# Patient Record
Sex: MALE | Race: WHITE | NOT HISPANIC OR LATINO | Employment: OTHER | URBAN - METROPOLITAN AREA
[De-identification: names, ages, dates, MRNs, and addresses within clinical notes are randomized per-mention and may not be internally consistent; named-entity substitution may affect disease eponyms.]

---

## 2020-10-22 ENCOUNTER — TELEPHONE (OUTPATIENT)
Dept: UROLOGY | Facility: CLINIC | Age: 85
End: 2020-10-22

## 2020-10-22 NOTE — TELEPHONE ENCOUNTER
Called pt to schedule appt/ referral . Pt stated he was seeing other provider. Does not need to schedule.

## 2021-01-14 ENCOUNTER — OFFICE VISIT (OUTPATIENT)
Dept: FAMILY MEDICINE | Facility: CLINIC | Age: 86
End: 2021-01-14
Payer: MEDICARE

## 2021-01-14 VITALS
SYSTOLIC BLOOD PRESSURE: 129 MMHG | DIASTOLIC BLOOD PRESSURE: 63 MMHG | TEMPERATURE: 97 F | OXYGEN SATURATION: 96 % | BODY MASS INDEX: 31.05 KG/M2 | WEIGHT: 193.19 LBS | HEART RATE: 80 BPM | HEIGHT: 66 IN

## 2021-01-14 DIAGNOSIS — I10 ESSENTIAL HYPERTENSION: Primary | ICD-10-CM

## 2021-01-14 DIAGNOSIS — M10.9 GOUTY ARTHROPATHY: ICD-10-CM

## 2021-01-14 DIAGNOSIS — N40.0 BENIGN PROSTATIC HYPERPLASIA, UNSPECIFIED WHETHER LOWER URINARY TRACT SYMPTOMS PRESENT: ICD-10-CM

## 2021-01-14 PROBLEM — M17.9 OSTEOARTHRITIS OF KNEE: Status: ACTIVE | Noted: 2017-11-08

## 2021-01-14 PROBLEM — E78.2 MIXED HYPERLIPIDEMIA: Status: ACTIVE | Noted: 2021-01-14

## 2021-01-14 PROBLEM — M79.7 FIBROMYOSITIS: Status: ACTIVE | Noted: 2021-01-14

## 2021-01-14 PROBLEM — N18.30 STAGE 3 CHRONIC KIDNEY DISEASE: Status: ACTIVE | Noted: 2018-11-08

## 2021-01-14 PROCEDURE — 99213 PR OFFICE/OUTPT VISIT, EST, LEVL III, 20-29 MIN: ICD-10-PCS | Mod: S$GLB,,, | Performed by: FAMILY MEDICINE

## 2021-01-14 PROCEDURE — 99213 OFFICE O/P EST LOW 20 MIN: CPT | Mod: S$GLB,,, | Performed by: FAMILY MEDICINE

## 2021-01-14 RX ORDER — MECLIZINE HYDROCHLORIDE 25 MG/1
25 TABLET ORAL 3 TIMES DAILY PRN
COMMUNITY
End: 2021-12-09

## 2021-01-14 RX ORDER — TAMSULOSIN HYDROCHLORIDE 0.4 MG/1
0.4 CAPSULE ORAL DAILY
Qty: 90 CAPSULE | Refills: 3 | Status: SHIPPED | OUTPATIENT
Start: 2021-01-14 | End: 2021-12-09 | Stop reason: SDUPTHER

## 2021-01-14 RX ORDER — HYDROCHLOROTHIAZIDE 12.5 MG/1
12.5 CAPSULE ORAL DAILY
Qty: 90 CAPSULE | Refills: 3 | Status: SHIPPED | OUTPATIENT
Start: 2021-01-14 | End: 2021-12-09 | Stop reason: SDUPTHER

## 2021-01-14 RX ORDER — SPIRONOLACTONE 25 MG/1
25 TABLET ORAL DAILY
COMMUNITY
Start: 2021-01-07 | End: 2021-12-09

## 2021-01-14 RX ORDER — LISINOPRIL 40 MG/1
40 TABLET ORAL DAILY
Qty: 90 TABLET | Refills: 30 | Status: SHIPPED | OUTPATIENT
Start: 2021-01-14 | End: 2021-12-09 | Stop reason: DRUGHIGH

## 2021-01-14 RX ORDER — LISINOPRIL 40 MG/1
40 TABLET ORAL DAILY
COMMUNITY
Start: 2020-10-14 | End: 2021-01-14 | Stop reason: SDUPTHER

## 2021-01-14 RX ORDER — ALLOPURINOL 300 MG/1
300 TABLET ORAL DAILY
Qty: 90 TABLET | Refills: 3 | Status: SHIPPED | OUTPATIENT
Start: 2021-01-14 | End: 2021-12-09 | Stop reason: SDUPTHER

## 2021-12-09 ENCOUNTER — OFFICE VISIT (OUTPATIENT)
Dept: FAMILY MEDICINE | Facility: CLINIC | Age: 86
End: 2021-12-09
Payer: MEDICARE

## 2021-12-09 ENCOUNTER — LAB VISIT (OUTPATIENT)
Dept: LAB | Facility: CLINIC | Age: 86
End: 2021-12-09
Payer: MEDICARE

## 2021-12-09 VITALS
BODY MASS INDEX: 30.05 KG/M2 | TEMPERATURE: 98 F | WEIGHT: 187 LBS | OXYGEN SATURATION: 98 % | HEART RATE: 63 BPM | SYSTOLIC BLOOD PRESSURE: 100 MMHG | HEIGHT: 66 IN | DIASTOLIC BLOOD PRESSURE: 60 MMHG

## 2021-12-09 DIAGNOSIS — R53.1 FUNCTIONAL WEAKNESS: ICD-10-CM

## 2021-12-09 DIAGNOSIS — M1A.00X0 CHRONIC GOUTY ARTHROPATHY: ICD-10-CM

## 2021-12-09 DIAGNOSIS — D63.8 ANEMIA OF CHRONIC DISEASE: Primary | ICD-10-CM

## 2021-12-09 DIAGNOSIS — M10.9 GOUTY ARTHROPATHY: ICD-10-CM

## 2021-12-09 DIAGNOSIS — E78.2 MIXED HYPERLIPIDEMIA: ICD-10-CM

## 2021-12-09 DIAGNOSIS — N40.0 BENIGN PROSTATIC HYPERPLASIA, UNSPECIFIED WHETHER LOWER URINARY TRACT SYMPTOMS PRESENT: ICD-10-CM

## 2021-12-09 DIAGNOSIS — R53.83 FATIGUE, UNSPECIFIED TYPE: ICD-10-CM

## 2021-12-09 DIAGNOSIS — I10 ESSENTIAL HYPERTENSION: ICD-10-CM

## 2021-12-09 DIAGNOSIS — I10 ESSENTIAL HYPERTENSION: Primary | ICD-10-CM

## 2021-12-09 PROBLEM — M1A.9XX0 CHRONIC GOUTY ARTHROPATHY: Status: ACTIVE | Noted: 2021-01-14

## 2021-12-09 LAB
ALBUMIN SERPL BCP-MCNC: 3.7 G/DL (ref 3.5–5.2)
ALP SERPL-CCNC: 88 U/L (ref 55–135)
ALT SERPL W/O P-5'-P-CCNC: 7 U/L (ref 10–44)
ANION GAP SERPL CALC-SCNC: 11 MMOL/L (ref 8–16)
AST SERPL-CCNC: 16 U/L (ref 10–40)
BASOPHILS # BLD AUTO: 0.05 K/UL (ref 0–0.2)
BASOPHILS NFR BLD: 0.7 % (ref 0–1.9)
BILIRUB SERPL-MCNC: 0.3 MG/DL (ref 0.1–1)
BUN SERPL-MCNC: 31 MG/DL (ref 10–30)
CALCIUM SERPL-MCNC: 9.1 MG/DL (ref 8.7–10.5)
CHLORIDE SERPL-SCNC: 109 MMOL/L (ref 95–110)
CHOLEST SERPL-MCNC: 142 MG/DL (ref 120–199)
CHOLEST/HDLC SERPL: 3.7 {RATIO} (ref 2–5)
CO2 SERPL-SCNC: 22 MMOL/L (ref 23–29)
CREAT SERPL-MCNC: 1.6 MG/DL (ref 0.5–1.4)
DIFFERENTIAL METHOD: ABNORMAL
EOSINOPHIL # BLD AUTO: 0.2 K/UL (ref 0–0.5)
EOSINOPHIL NFR BLD: 2.2 % (ref 0–8)
ERYTHROCYTE [DISTWIDTH] IN BLOOD BY AUTOMATED COUNT: 18.2 % (ref 11.5–14.5)
EST. GFR  (AFRICAN AMERICAN): 42 ML/MIN/1.73 M^2
EST. GFR  (NON AFRICAN AMERICAN): 36 ML/MIN/1.73 M^2
FERRITIN SERPL-MCNC: 8 NG/ML (ref 20–300)
GLUCOSE SERPL-MCNC: 99 MG/DL (ref 70–110)
HCT VFR BLD AUTO: 27.4 % (ref 40–54)
HDLC SERPL-MCNC: 38 MG/DL (ref 40–75)
HDLC SERPL: 26.8 % (ref 20–50)
HGB BLD-MCNC: 8 G/DL (ref 14–18)
IMM GRANULOCYTES # BLD AUTO: 0.01 K/UL (ref 0–0.04)
IMM GRANULOCYTES NFR BLD AUTO: 0.1 % (ref 0–0.5)
IRON SERPL-MCNC: 14 UG/DL (ref 45–160)
LDLC SERPL CALC-MCNC: 89.6 MG/DL (ref 63–159)
LYMPHOCYTES # BLD AUTO: 1.3 K/UL (ref 1–4.8)
LYMPHOCYTES NFR BLD: 18.3 % (ref 18–48)
MCH RBC QN AUTO: 22.2 PG (ref 27–31)
MCHC RBC AUTO-ENTMCNC: 29.2 G/DL (ref 32–36)
MCV RBC AUTO: 76 FL (ref 82–98)
MONOCYTES # BLD AUTO: 0.7 K/UL (ref 0.3–1)
MONOCYTES NFR BLD: 10.8 % (ref 4–15)
NEUTROPHILS # BLD AUTO: 4.7 K/UL (ref 1.8–7.7)
NEUTROPHILS NFR BLD: 67.9 % (ref 38–73)
NONHDLC SERPL-MCNC: 104 MG/DL
NRBC BLD-RTO: 0 /100 WBC
PLATELET # BLD AUTO: 252 K/UL (ref 150–450)
PMV BLD AUTO: 10.6 FL (ref 9.2–12.9)
POTASSIUM SERPL-SCNC: 4.3 MMOL/L (ref 3.5–5.1)
PROT SERPL-MCNC: 6.8 G/DL (ref 6–8.4)
RBC # BLD AUTO: 3.61 M/UL (ref 4.6–6.2)
SATURATED IRON: 3 % (ref 20–50)
SODIUM SERPL-SCNC: 142 MMOL/L (ref 136–145)
TOTAL IRON BINDING CAPACITY: 423 UG/DL (ref 250–450)
TRANSFERRIN SERPL-MCNC: 286 MG/DL (ref 200–375)
TRIGL SERPL-MCNC: 72 MG/DL (ref 30–150)
URATE SERPL-MCNC: 4.7 MG/DL (ref 3.4–7)
WBC # BLD AUTO: 6.88 K/UL (ref 3.9–12.7)

## 2021-12-09 PROCEDURE — 84466 ASSAY OF TRANSFERRIN: CPT | Performed by: NURSE PRACTITIONER

## 2021-12-09 PROCEDURE — 99214 PR OFFICE/OUTPT VISIT, EST, LEVL IV, 30-39 MIN: ICD-10-PCS | Mod: S$GLB,,, | Performed by: NURSE PRACTITIONER

## 2021-12-09 PROCEDURE — 84550 ASSAY OF BLOOD/URIC ACID: CPT | Performed by: NURSE PRACTITIONER

## 2021-12-09 PROCEDURE — 80053 COMPREHEN METABOLIC PANEL: CPT | Performed by: NURSE PRACTITIONER

## 2021-12-09 PROCEDURE — 82728 ASSAY OF FERRITIN: CPT | Performed by: NURSE PRACTITIONER

## 2021-12-09 PROCEDURE — 80061 LIPID PANEL: CPT | Performed by: NURSE PRACTITIONER

## 2021-12-09 PROCEDURE — 85025 COMPLETE CBC W/AUTO DIFF WBC: CPT | Performed by: NURSE PRACTITIONER

## 2021-12-09 PROCEDURE — 99214 OFFICE O/P EST MOD 30 MIN: CPT | Mod: S$GLB,,, | Performed by: NURSE PRACTITIONER

## 2021-12-09 RX ORDER — CHOLESTYRAMINE 4 G/9G
1 POWDER, FOR SUSPENSION ORAL DAILY
Qty: 378 G | Refills: 11 | Status: SHIPPED | OUTPATIENT
Start: 2021-12-09 | End: 2022-09-29 | Stop reason: SDUPTHER

## 2021-12-09 RX ORDER — LISINOPRIL 20 MG/1
20 TABLET ORAL DAILY
Qty: 90 TABLET | Refills: 3 | Status: SHIPPED | OUTPATIENT
Start: 2021-12-09 | End: 2022-08-16 | Stop reason: SDUPTHER

## 2021-12-09 RX ORDER — TAMSULOSIN HYDROCHLORIDE 0.4 MG/1
0.4 CAPSULE ORAL DAILY
Qty: 90 CAPSULE | Refills: 3 | Status: SHIPPED | OUTPATIENT
Start: 2021-12-09 | End: 2022-09-29 | Stop reason: SDUPTHER

## 2021-12-09 RX ORDER — TRAVOPROST OPHTHALMIC SOLUTION 0.04 MG/ML
1 SOLUTION OPHTHALMIC NIGHTLY
COMMUNITY
Start: 2021-11-03 | End: 2021-12-09 | Stop reason: SDUPTHER

## 2021-12-09 RX ORDER — CHOLESTYRAMINE 4 G/9G
1 POWDER, FOR SUSPENSION ORAL DAILY
COMMUNITY
End: 2021-12-09 | Stop reason: SDUPTHER

## 2021-12-09 RX ORDER — ALLOPURINOL 300 MG/1
300 TABLET ORAL DAILY
Qty: 90 TABLET | Refills: 3 | Status: SHIPPED | OUTPATIENT
Start: 2021-12-09 | End: 2022-09-29 | Stop reason: SDUPTHER

## 2021-12-09 RX ORDER — HYDROCHLOROTHIAZIDE 12.5 MG/1
12.5 CAPSULE ORAL DAILY
Qty: 90 CAPSULE | Refills: 3 | Status: SHIPPED | OUTPATIENT
Start: 2021-12-09 | End: 2022-09-29 | Stop reason: SDUPTHER

## 2021-12-10 DIAGNOSIS — N18.30 STAGE 3 CHRONIC KIDNEY DISEASE, UNSPECIFIED WHETHER STAGE 3A OR 3B CKD: ICD-10-CM

## 2021-12-10 DIAGNOSIS — D50.9 IRON DEFICIENCY ANEMIA, UNSPECIFIED IRON DEFICIENCY ANEMIA TYPE: Primary | ICD-10-CM

## 2021-12-10 RX ORDER — LANOLIN ALCOHOL/MO/W.PET/CERES
1 CREAM (GRAM) TOPICAL 2 TIMES DAILY
Qty: 60 TABLET | Refills: 5 | Status: SHIPPED | OUTPATIENT
Start: 2021-12-10 | End: 2022-05-17 | Stop reason: SDUPTHER

## 2022-05-17 ENCOUNTER — PATIENT MESSAGE (OUTPATIENT)
Dept: FAMILY MEDICINE | Facility: CLINIC | Age: 87
End: 2022-05-17
Payer: MEDICARE

## 2022-05-17 ENCOUNTER — OFFICE VISIT (OUTPATIENT)
Dept: FAMILY MEDICINE | Facility: CLINIC | Age: 87
End: 2022-05-17
Payer: MEDICARE

## 2022-05-17 ENCOUNTER — LAB VISIT (OUTPATIENT)
Dept: LAB | Facility: CLINIC | Age: 87
End: 2022-05-17
Payer: MEDICARE

## 2022-05-17 VITALS
SYSTOLIC BLOOD PRESSURE: 152 MMHG | BODY MASS INDEX: 29.98 KG/M2 | HEIGHT: 67 IN | WEIGHT: 191 LBS | HEART RATE: 66 BPM | OXYGEN SATURATION: 99 % | TEMPERATURE: 98 F | DIASTOLIC BLOOD PRESSURE: 64 MMHG

## 2022-05-17 DIAGNOSIS — N18.32 STAGE 3B CHRONIC KIDNEY DISEASE: ICD-10-CM

## 2022-05-17 DIAGNOSIS — D50.0 IRON DEFICIENCY ANEMIA DUE TO CHRONIC BLOOD LOSS: Primary | ICD-10-CM

## 2022-05-17 DIAGNOSIS — D50.0 IRON DEFICIENCY ANEMIA DUE TO CHRONIC BLOOD LOSS: ICD-10-CM

## 2022-05-17 DIAGNOSIS — D50.9 IRON DEFICIENCY ANEMIA, UNSPECIFIED IRON DEFICIENCY ANEMIA TYPE: ICD-10-CM

## 2022-05-17 DIAGNOSIS — I10 ESSENTIAL HYPERTENSION: ICD-10-CM

## 2022-05-17 LAB
ALBUMIN SERPL BCP-MCNC: 3.7 G/DL (ref 3.5–5.2)
ALP SERPL-CCNC: 80 U/L (ref 55–135)
ALT SERPL W/O P-5'-P-CCNC: 14 U/L (ref 10–44)
ANION GAP SERPL CALC-SCNC: 9 MMOL/L (ref 8–16)
ANISOCYTOSIS BLD QL SMEAR: SLIGHT
AST SERPL-CCNC: 16 U/L (ref 10–40)
BASOPHILS # BLD AUTO: 0.05 K/UL (ref 0–0.2)
BASOPHILS NFR BLD: 0.9 % (ref 0–1.9)
BILIRUB SERPL-MCNC: 0.3 MG/DL (ref 0.1–1)
BUN SERPL-MCNC: 31 MG/DL (ref 10–30)
CALCIUM SERPL-MCNC: 8.7 MG/DL (ref 8.7–10.5)
CHLORIDE SERPL-SCNC: 109 MMOL/L (ref 95–110)
CO2 SERPL-SCNC: 22 MMOL/L (ref 23–29)
CREAT SERPL-MCNC: 1.6 MG/DL (ref 0.5–1.4)
DACRYOCYTES BLD QL SMEAR: ABNORMAL
DIFFERENTIAL METHOD: ABNORMAL
EOSINOPHIL # BLD AUTO: 0.2 K/UL (ref 0–0.5)
EOSINOPHIL NFR BLD: 3.7 % (ref 0–8)
ERYTHROCYTE [DISTWIDTH] IN BLOOD BY AUTOMATED COUNT: 19.7 % (ref 11.5–14.5)
EST. GFR  (AFRICAN AMERICAN): 42 ML/MIN/1.73 M^2
EST. GFR  (NON AFRICAN AMERICAN): 36 ML/MIN/1.73 M^2
GLUCOSE SERPL-MCNC: 101 MG/DL (ref 70–110)
HCT VFR BLD AUTO: 23.3 % (ref 40–54)
HGB BLD-MCNC: 6.4 G/DL (ref 14–18)
HYPOCHROMIA BLD QL SMEAR: ABNORMAL
IMM GRANULOCYTES # BLD AUTO: 0.01 K/UL (ref 0–0.04)
IMM GRANULOCYTES NFR BLD AUTO: 0.2 % (ref 0–0.5)
LYMPHOCYTES # BLD AUTO: 1.5 K/UL (ref 1–4.8)
LYMPHOCYTES NFR BLD: 26.1 % (ref 18–48)
MCH RBC QN AUTO: 19.1 PG (ref 27–31)
MCHC RBC AUTO-ENTMCNC: 27.5 G/DL (ref 32–36)
MCV RBC AUTO: 70 FL (ref 82–98)
MONOCYTES # BLD AUTO: 0.6 K/UL (ref 0.3–1)
MONOCYTES NFR BLD: 10.9 % (ref 4–15)
NEUTROPHILS # BLD AUTO: 3.3 K/UL (ref 1.8–7.7)
NEUTROPHILS NFR BLD: 58.2 % (ref 38–73)
NRBC BLD-RTO: 0 /100 WBC
OVALOCYTES BLD QL SMEAR: ABNORMAL
PLATELET # BLD AUTO: 262 K/UL (ref 150–450)
PLATELET BLD QL SMEAR: ABNORMAL
PMV BLD AUTO: 10.3 FL (ref 9.2–12.9)
POIKILOCYTOSIS BLD QL SMEAR: SLIGHT
POLYCHROMASIA BLD QL SMEAR: ABNORMAL
POTASSIUM SERPL-SCNC: 4.5 MMOL/L (ref 3.5–5.1)
PROT SERPL-MCNC: 6.7 G/DL (ref 6–8.4)
RBC # BLD AUTO: 3.35 M/UL (ref 4.6–6.2)
RETICS/RBC NFR AUTO: 2.1 % (ref 0.4–2)
SODIUM SERPL-SCNC: 140 MMOL/L (ref 136–145)
WBC # BLD AUTO: 5.7 K/UL (ref 3.9–12.7)

## 2022-05-17 PROCEDURE — 99214 OFFICE O/P EST MOD 30 MIN: CPT | Mod: S$PBB,,, | Performed by: FAMILY MEDICINE

## 2022-05-17 PROCEDURE — 99999 PR PBB SHADOW E&M-EST. PATIENT-LVL III: CPT | Mod: PBBFAC,,, | Performed by: FAMILY MEDICINE

## 2022-05-17 PROCEDURE — 99213 OFFICE O/P EST LOW 20 MIN: CPT | Mod: PBBFAC,PN | Performed by: FAMILY MEDICINE

## 2022-05-17 PROCEDURE — 99214 PR OFFICE/OUTPT VISIT, EST, LEVL IV, 30-39 MIN: ICD-10-PCS | Mod: S$PBB,,, | Performed by: FAMILY MEDICINE

## 2022-05-17 PROCEDURE — 85025 COMPLETE CBC W/AUTO DIFF WBC: CPT | Performed by: FAMILY MEDICINE

## 2022-05-17 PROCEDURE — 99999 PR PBB SHADOW E&M-EST. PATIENT-LVL III: ICD-10-PCS | Mod: PBBFAC,,, | Performed by: FAMILY MEDICINE

## 2022-05-17 PROCEDURE — 85045 AUTOMATED RETICULOCYTE COUNT: CPT | Performed by: FAMILY MEDICINE

## 2022-05-17 PROCEDURE — 80053 COMPREHEN METABOLIC PANEL: CPT | Performed by: FAMILY MEDICINE

## 2022-05-17 RX ORDER — FERROUS SULFATE 325(65) MG
325 TABLET, DELAYED RELEASE (ENTERIC COATED) ORAL 2 TIMES DAILY
Qty: 60 TABLET | Refills: 5 | Status: SHIPPED | OUTPATIENT
Start: 2022-05-17 | End: 2023-01-03

## 2022-05-17 NOTE — PROGRESS NOTES
SUBJECTIVE:    Patient ID: Vel Banda is a 89 y.o. male.    Chief Complaint: Hearing Problem (Right ear- denies any pain or drainage. Denies any uri symptoms.)    Follow-up on this very active alert 89-year-old male.  He comes in complaining of his right ear being stopped up.  He additionally complains of some fatigue however he is still very active and [employer] a [place] acre field regularly,   etc.  Review of his lab last December shows patient with the new onset of anemia with a hemoglobin of 8 and a hematocrit of 27.  This is a significant decrease from October of 2020 when his H&H was 15 in 45.  Iron levels are markedly decreased in December.  A prescription for ferrous sulfate was called in for him to take however apparently he did not receive his prescription.    He has had a colonoscopy in the distant past.  He did not do a fecal occult blood  He has noticed no blood in his stool.  He denies any upper GI are gastric upset.              Lab Visit on 12/09/2021   Component Date Value Ref Range Status    Sodium 12/09/2021 142  136 - 145 mmol/L Final    Potassium 12/09/2021 4.3  3.5 - 5.1 mmol/L Final    Chloride 12/09/2021 109  95 - 110 mmol/L Final    CO2 12/09/2021 22 (A) 23 - 29 mmol/L Final    Glucose 12/09/2021 99  70 - 110 mg/dL Final    BUN 12/09/2021 31 (A) 10 - 30 mg/dL Final    Creatinine 12/09/2021 1.6 (A) 0.5 - 1.4 mg/dL Final    Calcium 12/09/2021 9.1  8.7 - 10.5 mg/dL Final    Total Protein 12/09/2021 6.8  6.0 - 8.4 g/dL Final    Albumin 12/09/2021 3.7  3.5 - 5.2 g/dL Final    Total Bilirubin 12/09/2021 0.3  0.1 - 1.0 mg/dL Final    Alkaline Phosphatase 12/09/2021 88  55 - 135 U/L Final    AST 12/09/2021 16  10 - 40 U/L Final    ALT 12/09/2021 7 (A) 10 - 44 U/L Final    Anion Gap 12/09/2021 11  8 - 16 mmol/L Final    eGFR if  12/09/2021 42 (A) >60 mL/min/1.73 m^2 Final    eGFR if non  12/09/2021 36 (A) >60 mL/min/1.73 m^2 Final    Cholesterol  12/09/2021 142  120 - 199 mg/dL Final    Triglycerides 12/09/2021 72  30 - 150 mg/dL Final    HDL 12/09/2021 38 (A) 40 - 75 mg/dL Final    LDL Cholesterol 12/09/2021 89.6  63.0 - 159.0 mg/dL Final    HDL/Cholesterol Ratio 12/09/2021 26.8  20.0 - 50.0 % Final    Total Cholesterol/HDL Ratio 12/09/2021 3.7  2.0 - 5.0 Final    Non-HDL Cholesterol 12/09/2021 104  mg/dL Final    WBC 12/09/2021 6.88  3.90 - 12.70 K/uL Final    RBC 12/09/2021 3.61 (A) 4.60 - 6.20 M/uL Final    Hemoglobin 12/09/2021 8.0 (A) 14.0 - 18.0 g/dL Final    Hematocrit 12/09/2021 27.4 (A) 40.0 - 54.0 % Final    MCV 12/09/2021 76 (A) 82 - 98 fL Final    MCH 12/09/2021 22.2 (A) 27.0 - 31.0 pg Final    MCHC 12/09/2021 29.2 (A) 32.0 - 36.0 g/dL Final    RDW 12/09/2021 18.2 (A) 11.5 - 14.5 % Final    Platelets 12/09/2021 252  150 - 450 K/uL Final    MPV 12/09/2021 10.6  9.2 - 12.9 fL Final    Immature Granulocytes 12/09/2021 0.1  0.0 - 0.5 % Final    Gran # (ANC) 12/09/2021 4.7  1.8 - 7.7 K/uL Final    Immature Grans (Abs) 12/09/2021 0.01  0.00 - 0.04 K/uL Final    Lymph # 12/09/2021 1.3  1.0 - 4.8 K/uL Final    Mono # 12/09/2021 0.7  0.3 - 1.0 K/uL Final    Eos # 12/09/2021 0.2  0.0 - 0.5 K/uL Final    Baso # 12/09/2021 0.05  0.00 - 0.20 K/uL Final    nRBC 12/09/2021 0  0 /100 WBC Final    Gran % 12/09/2021 67.9  38.0 - 73.0 % Final    Lymph % 12/09/2021 18.3  18.0 - 48.0 % Final    Mono % 12/09/2021 10.8  4.0 - 15.0 % Final    Eosinophil % 12/09/2021 2.2  0.0 - 8.0 % Final    Basophil % 12/09/2021 0.7  0.0 - 1.9 % Final    Differential Method 12/09/2021 Automated   Final    Uric Acid 12/09/2021 4.7  3.4 - 7.0 mg/dL Final    Iron 12/09/2021 14 (A) 45 - 160 ug/dL Final    Transferrin 12/09/2021 286  200 - 375 mg/dL Final    TIBC 12/09/2021 423  250 - 450 ug/dL Final    Saturated Iron 12/09/2021 3 (A) 20 - 50 % Final    Ferritin 12/09/2021 8 (A) 20.0 - 300.0 ng/mL Final       Past Medical History:   Diagnosis Date     Actinic keratosis     Benign prostatic hyperplasia     Chronic kidney disease     Stage 3     Fibromyositis     Gouty arthropathy     Hyperlipidemia     Hypertension     Osteoarthritis of knee      Social History     Socioeconomic History    Marital status:    Tobacco Use    Smoking status: Former Smoker     Types: Cigarettes     Quit date:      Years since quittin.4    Smokeless tobacco: Never Used   Substance and Sexual Activity    Alcohol use: Yes    Drug use: Never     Past Surgical History:   Procedure Laterality Date    APPENDECTOMY      cataractsx2  2011    COLONOSCOPY       Family History   Problem Relation Age of Onset    Hypertension Mother     Coronary artery disease Mother        Review of patient's allergies indicates:   Allergen Reactions    Pcn [penicillins]        Current Outpatient Medications:     allopurinoL (ZYLOPRIM) 300 MG tablet, Take 1 tablet (300 mg total) by mouth once daily., Disp: 90 tablet, Rfl: 3    aspirin 81 MG Chew, Take 81 mg by mouth once daily., Disp: , Rfl:     cholestyramine, with sugar, 4 gram Powd, Take 1 Scoop by mouth once daily., Disp: 378 g, Rfl: 11    ferrous sulfate 325 (65 FE) MG EC tablet, Take 1 tablet (325 mg total) by mouth 2 (two) times daily., Disp: 60 tablet, Rfl: 5    hydroCHLOROthiazide (MICROZIDE) 12.5 mg capsule, Take 1 capsule (12.5 mg total) by mouth once daily., Disp: 90 capsule, Rfl: 3    lisinopriL (PRINIVIL,ZESTRIL) 20 MG tablet, Take 1 tablet (20 mg total) by mouth once daily., Disp: 90 tablet, Rfl: 3    tamsulosin (FLOMAX) 0.4 mg Cap, Take 1 capsule (0.4 mg total) by mouth once daily., Disp: 90 capsule, Rfl: 3    travoprost, benzalkonium, (TRAVATAN) 0.004 % ophthalmic solution, 1 drop every evening., Disp: , Rfl:     Review of Systems   Constitutional:            Obsipation  Lower leg claudication at 50 steps           Objective:      Vitals:    22 1522 22 1525   BP: (!) 156/64 (!) 152/64  "  Pulse: 66    Temp: 98.3 °F (36.8 °C)    TempSrc: Temporal    SpO2: 99%    Weight: 86.6 kg (191 lb)    Height: 5' 7" (1.702 m)      Physical Exam  Eyes:      Comments: Pale conjunctiva   Neck:      Vascular: No carotid bruit.   Cardiovascular:      Rate and Rhythm: Regular rhythm.      Comments:       Decreased  dorsalis pedis and posterior tibial peripheral pulses  Pulmonary:      Effort: Pulmonary effort is normal.      Breath sounds: Normal breath sounds.   Musculoskeletal:      Right lower leg: No edema.      Left lower leg: Edema present.   Neurological:      General: No focal deficit present.      Mental Status: He is alert and oriented to person, place, and time.   Psychiatric:         Mood and Affect: Mood normal.         Behavior: Behavior normal.         Thought Content: Thought content normal.           Assessment:       1. Iron deficiency anemia due to chronic blood loss    2. Essential hypertension    3. Stage 3b chronic kidney disease    4. Iron deficiency anemia, unspecified iron deficiency anemia type         Plan:       Iron deficiency anemia due to chronic blood loss  -     CBC Auto Differential; Future; Expected date: 05/17/2022  -     Reticulocytes; Future; Expected date: 05/17/2022  -     Fecal Immunochemical Test (iFOBT); Future; Expected date: 05/17/2022  -     Comprehensive metabolic panel; Future; Expected date: 05/17/2022  -     CBC Auto Differential; Future; Expected date: 06/17/2022  -     Reticulocytes; Future; Expected date: 06/17/2022    Essential hypertension  -     Comprehensive metabolic panel; Future; Expected date: 05/17/2022    Stage 3b chronic kidney disease    Iron deficiency anemia, unspecified iron deficiency anemia type  -     ferrous sulfate 325 (65 FE) MG EC tablet; Take 1 tablet (325 mg total) by mouth 2 (two) times daily.  Dispense: 60 tablet; Refill: 5      Follow up in about 4 weeks (around 6/14/2022).      Obtain fecal occult blood test.    Recheck CBC  Check retic " count  Start iron twice a day  May use OTC Miralax if needed   5/17/2022 Devon Medrano M.D.

## 2022-05-17 NOTE — PATIENT INSTRUCTIONS
Obtain fecal occult blood test.    Recheck CBC  Check retic count  Start iron twice a day  May use OTC Miralax if needed

## 2022-06-09 ENCOUNTER — LAB VISIT (OUTPATIENT)
Dept: LAB | Facility: HOSPITAL | Age: 87
End: 2022-06-09
Payer: MEDICARE

## 2022-06-09 DIAGNOSIS — D50.0 IRON DEFICIENCY ANEMIA DUE TO CHRONIC BLOOD LOSS: ICD-10-CM

## 2022-06-09 PROCEDURE — 82274 ASSAY TEST FOR BLOOD FECAL: CPT | Performed by: FAMILY MEDICINE

## 2022-06-14 LAB — HEMOCCULT STL QL IA: POSITIVE

## 2022-06-16 ENCOUNTER — LAB VISIT (OUTPATIENT)
Dept: LAB | Facility: CLINIC | Age: 87
End: 2022-06-16
Payer: MEDICARE

## 2022-06-16 ENCOUNTER — OFFICE VISIT (OUTPATIENT)
Dept: FAMILY MEDICINE | Facility: CLINIC | Age: 87
End: 2022-06-16
Payer: MEDICARE

## 2022-06-16 VITALS
TEMPERATURE: 98 F | OXYGEN SATURATION: 98 % | SYSTOLIC BLOOD PRESSURE: 118 MMHG | WEIGHT: 190 LBS | DIASTOLIC BLOOD PRESSURE: 80 MMHG | BODY MASS INDEX: 29.82 KG/M2 | HEIGHT: 67 IN | HEART RATE: 74 BPM

## 2022-06-16 DIAGNOSIS — D50.0 IRON DEFICIENCY ANEMIA DUE TO CHRONIC BLOOD LOSS: Primary | ICD-10-CM

## 2022-06-16 DIAGNOSIS — D50.0 IRON DEFICIENCY ANEMIA DUE TO CHRONIC BLOOD LOSS: ICD-10-CM

## 2022-06-16 LAB
ANISOCYTOSIS BLD QL SMEAR: ABNORMAL
BASOPHILS # BLD AUTO: 0.1 K/UL (ref 0–0.2)
BASOPHILS NFR BLD: 1.5 % (ref 0–1.9)
DACRYOCYTES BLD QL SMEAR: ABNORMAL
DIFFERENTIAL METHOD: ABNORMAL
EOSINOPHIL # BLD AUTO: 0.2 K/UL (ref 0–0.5)
EOSINOPHIL NFR BLD: 3.3 % (ref 0–8)
ERYTHROCYTE [DISTWIDTH] IN BLOOD BY AUTOMATED COUNT: ABNORMAL % (ref 11.5–14.5)
HCT VFR BLD AUTO: 35.4 % (ref 40–54)
HGB BLD-MCNC: 11 G/DL (ref 14–18)
HYPOCHROMIA BLD QL SMEAR: ABNORMAL
IMM GRANULOCYTES # BLD AUTO: 0.03 K/UL (ref 0–0.04)
IMM GRANULOCYTES NFR BLD AUTO: 0.5 % (ref 0–0.5)
IRON SERPL-MCNC: 218 UG/DL (ref 45–160)
LYMPHOCYTES # BLD AUTO: 1.4 K/UL (ref 1–4.8)
LYMPHOCYTES NFR BLD: 21.9 % (ref 18–48)
MCH RBC QN AUTO: 25.5 PG (ref 27–31)
MCHC RBC AUTO-ENTMCNC: 31.1 G/DL (ref 32–36)
MCV RBC AUTO: 82 FL (ref 82–98)
MONOCYTES # BLD AUTO: 0.6 K/UL (ref 0.3–1)
MONOCYTES NFR BLD: 9 % (ref 4–15)
NEUTROPHILS # BLD AUTO: 4.2 K/UL (ref 1.8–7.7)
NEUTROPHILS NFR BLD: 63.8 % (ref 38–73)
NRBC BLD-RTO: 0 /100 WBC
OVALOCYTES BLD QL SMEAR: ABNORMAL
PLATELET # BLD AUTO: 225 K/UL (ref 150–450)
PLATELET BLD QL SMEAR: ABNORMAL
PMV BLD AUTO: 10.5 FL (ref 9.2–12.9)
POIKILOCYTOSIS BLD QL SMEAR: SLIGHT
POLYCHROMASIA BLD QL SMEAR: ABNORMAL
RBC # BLD AUTO: 4.32 M/UL (ref 4.6–6.2)
RETICS/RBC NFR AUTO: 2.7 % (ref 0.4–2)
SATURATED IRON: 58 % (ref 20–50)
SCHISTOCYTES BLD QL SMEAR: ABNORMAL
SCHISTOCYTES BLD QL SMEAR: PRESENT
TOTAL IRON BINDING CAPACITY: 379 UG/DL (ref 250–450)
TRANSFERRIN SERPL-MCNC: 256 MG/DL (ref 200–375)
WBC # BLD AUTO: 6.58 K/UL (ref 3.9–12.7)

## 2022-06-16 PROCEDURE — 85025 COMPLETE CBC W/AUTO DIFF WBC: CPT | Performed by: FAMILY MEDICINE

## 2022-06-16 PROCEDURE — 99999 PR PBB SHADOW E&M-EST. PATIENT-LVL III: ICD-10-PCS | Mod: PBBFAC,,, | Performed by: FAMILY MEDICINE

## 2022-06-16 PROCEDURE — 84466 ASSAY OF TRANSFERRIN: CPT | Performed by: FAMILY MEDICINE

## 2022-06-16 PROCEDURE — 99213 OFFICE O/P EST LOW 20 MIN: CPT | Mod: S$PBB,,, | Performed by: FAMILY MEDICINE

## 2022-06-16 PROCEDURE — 99213 OFFICE O/P EST LOW 20 MIN: CPT | Mod: PBBFAC,PN | Performed by: FAMILY MEDICINE

## 2022-06-16 PROCEDURE — 85045 AUTOMATED RETICULOCYTE COUNT: CPT | Performed by: FAMILY MEDICINE

## 2022-06-16 PROCEDURE — 99999 PR PBB SHADOW E&M-EST. PATIENT-LVL III: CPT | Mod: PBBFAC,,, | Performed by: FAMILY MEDICINE

## 2022-06-16 PROCEDURE — 99213 PR OFFICE/OUTPT VISIT, EST, LEVL III, 20-29 MIN: ICD-10-PCS | Mod: S$PBB,,, | Performed by: FAMILY MEDICINE

## 2022-06-16 NOTE — PATIENT INSTRUCTIONS
Continue iron p.o. 1-2 a day.  Obtain repeat lab work today.  We will set up appointment with Gastroenterology to try to find source of blood loss.

## 2022-06-16 NOTE — PROGRESS NOTES
SUBJECTIVE:    Patient ID: Vel Banda is a 89 y.o. male.    Chief Complaint: Follow-up (Pt here for f/u labs/See reports/No new concerns)    Follow-up on this 89-year-old male who was noted to have a profound anemia last visit.  It was iron deficiency.  Hemoccult is positive    He was placed on iron supplementation 325 mg twice a day.  He is only able to take it once a day.  He does report feeling better.  Family member reports less sleeping and napping during the day.      Obtain repeat CBC today and a Retic count     Needs GI workup to find source of occult in blood.      Lab Visit on 06/09/2022   Component Date Value Ref Range Status    Fecal Immunochemical Test (iFOBT) 06/14/2022 Positive (A) Negative Final   Lab Visit on 05/17/2022   Component Date Value Ref Range Status    WBC 05/17/2022 5.70  3.90 - 12.70 K/uL Final    RBC 05/17/2022 3.35 (A) 4.60 - 6.20 M/uL Final    Hemoglobin 05/17/2022 6.4 (A) 14.0 - 18.0 g/dL Final    Hematocrit 05/17/2022 23.3 (A) 40.0 - 54.0 % Final    MCV 05/17/2022 70 (A) 82 - 98 fL Final    MCH 05/17/2022 19.1 (A) 27.0 - 31.0 pg Final    MCHC 05/17/2022 27.5 (A) 32.0 - 36.0 g/dL Final    RDW 05/17/2022 19.7 (A) 11.5 - 14.5 % Final    Platelets 05/17/2022 262  150 - 450 K/uL Final    MPV 05/17/2022 10.3  9.2 - 12.9 fL Final    Immature Granulocytes 05/17/2022 0.2  0.0 - 0.5 % Final    Gran # (ANC) 05/17/2022 3.3  1.8 - 7.7 K/uL Final    Immature Grans (Abs) 05/17/2022 0.01  0.00 - 0.04 K/uL Final    Lymph # 05/17/2022 1.5  1.0 - 4.8 K/uL Final    Mono # 05/17/2022 0.6  0.3 - 1.0 K/uL Final    Eos # 05/17/2022 0.2  0.0 - 0.5 K/uL Final    Baso # 05/17/2022 0.05  0.00 - 0.20 K/uL Final    nRBC 05/17/2022 0  0 /100 WBC Final    Gran % 05/17/2022 58.2  38.0 - 73.0 % Final    Lymph % 05/17/2022 26.1  18.0 - 48.0 % Final    Mono % 05/17/2022 10.9  4.0 - 15.0 % Final    Eosinophil % 05/17/2022 3.7  0.0 - 8.0 % Final    Basophil % 05/17/2022 0.9  0.0 - 1.9 %  Final    Platelet Estimate 2022 Appears normal   Final    Aniso 2022 Slight   Final    Poik 2022 Slight   Final    Poly 2022 Occasional   Final    Hypo 2022 Occasional   Final    Ovalocytes 2022 Occasional   Final    Tear Drop Cells 2022 Occasional   Final    Differential Method 2022 Automated   Final    Retic 2022 2.1 (A) 0.4 - 2.0 % Final    Sodium 2022 140  136 - 145 mmol/L Final    Potassium 2022 4.5  3.5 - 5.1 mmol/L Final    Chloride 2022 109  95 - 110 mmol/L Final    CO2 2022 22 (A) 23 - 29 mmol/L Final    Glucose 2022 101  70 - 110 mg/dL Final    BUN 2022 31 (A) 10 - 30 mg/dL Final    Creatinine 2022 1.6 (A) 0.5 - 1.4 mg/dL Final    Calcium 2022 8.7  8.7 - 10.5 mg/dL Final    Total Protein 2022 6.7  6.0 - 8.4 g/dL Final    Albumin 2022 3.7  3.5 - 5.2 g/dL Final    Total Bilirubin 2022 0.3  0.1 - 1.0 mg/dL Final    Alkaline Phosphatase 2022 80  55 - 135 U/L Final    AST 2022 16  10 - 40 U/L Final    ALT 2022 14  10 - 44 U/L Final    Anion Gap 2022 9  8 - 16 mmol/L Final    eGFR if  2022 42 (A) >60 mL/min/1.73 m^2 Final    eGFR if non  2022 36 (A) >60 mL/min/1.73 m^2 Final       Past Medical History:   Diagnosis Date    Actinic keratosis     Benign prostatic hyperplasia     Chronic kidney disease     Stage 3     Fibromyositis     Gouty arthropathy     Hyperlipidemia     Hypertension     Osteoarthritis of knee      Social History     Socioeconomic History    Marital status:    Tobacco Use    Smoking status: Former Smoker     Types: Cigarettes     Quit date: 1975     Years since quittin.4    Smokeless tobacco: Never Used   Substance and Sexual Activity    Alcohol use: Yes    Drug use: Never     Past Surgical History:   Procedure Laterality Date    APPENDECTOMY       "cataractsx2  02/21/2011    COLONOSCOPY       Family History   Problem Relation Age of Onset    Hypertension Mother     Coronary artery disease Mother        Review of patient's allergies indicates:   Allergen Reactions    Pcn [penicillins]        Current Outpatient Medications:     allopurinoL (ZYLOPRIM) 300 MG tablet, Take 1 tablet (300 mg total) by mouth once daily., Disp: 90 tablet, Rfl: 3    aspirin 81 MG Chew, Take 81 mg by mouth once daily., Disp: , Rfl:     cholestyramine, with sugar, 4 gram Powd, Take 1 Scoop by mouth once daily., Disp: 378 g, Rfl: 11    ferrous sulfate 325 (65 FE) MG EC tablet, Take 1 tablet (325 mg total) by mouth 2 (two) times daily. (Patient taking differently: Take 325 mg by mouth 2 (two) times daily. Taking 1 qd), Disp: 60 tablet, Rfl: 5    hydroCHLOROthiazide (MICROZIDE) 12.5 mg capsule, Take 1 capsule (12.5 mg total) by mouth once daily., Disp: 90 capsule, Rfl: 3    lisinopriL (PRINIVIL,ZESTRIL) 20 MG tablet, Take 1 tablet (20 mg total) by mouth once daily., Disp: 90 tablet, Rfl: 3    tamsulosin (FLOMAX) 0.4 mg Cap, Take 1 capsule (0.4 mg total) by mouth once daily., Disp: 90 capsule, Rfl: 3    travoprost, benzalkonium, (TRAVATAN) 0.004 % ophthalmic solution, 1 drop every evening., Disp: , Rfl:     Review of Systems   Constitutional:        Per HPI           Objective:      Vitals:    06/16/22 1450   BP: 118/80   Pulse: 74   Temp: 98.2 °F (36.8 °C)   SpO2: 98%   Weight: 86.2 kg (190 lb)   Height: 5' 7" (1.702 m)     Physical Exam  Eyes:      Comments: Pale conjunctiva   Neck:      Vascular: No carotid bruit.   Cardiovascular:      Rate and Rhythm: Regular rhythm.      Comments:       Decreased  dorsalis pedis and posterior tibial peripheral pulses  Pulmonary:      Effort: Pulmonary effort is normal.      Breath sounds: Normal breath sounds.   Neurological:      General: No focal deficit present.      Mental Status: He is alert and oriented to person, place, and time. "   Psychiatric:         Mood and Affect: Mood normal.         Behavior: Behavior normal.         Thought Content: Thought content normal.           Assessment:       1. Iron deficiency anemia due to chronic blood loss         Plan:       Iron deficiency anemia due to chronic blood loss  -     Ambulatory referral/consult to Gastroenterology; Future; Expected date: 06/23/2022  -     CBC Auto Differential; Future; Expected date: 06/16/2022  -     Reticulocytes; Future; Expected date: 06/16/2022  -     Iron and TIBC; Future; Expected date: 06/16/2022      Follow up in about 1 month (around 7/16/2022).        6/16/2022 Devon Medrano M.D.

## 2022-07-05 ENCOUNTER — OFFICE VISIT (OUTPATIENT)
Dept: GASTROENTEROLOGY | Facility: CLINIC | Age: 87
End: 2022-07-05
Payer: MEDICARE

## 2022-07-05 VITALS
BODY MASS INDEX: 29.31 KG/M2 | HEIGHT: 67 IN | OXYGEN SATURATION: 98 % | SYSTOLIC BLOOD PRESSURE: 149 MMHG | HEART RATE: 70 BPM | WEIGHT: 186.75 LBS | DIASTOLIC BLOOD PRESSURE: 68 MMHG

## 2022-07-05 DIAGNOSIS — Z87.19 HISTORY OF CONSTIPATION: ICD-10-CM

## 2022-07-05 DIAGNOSIS — R23.3 EASY BRUISING: ICD-10-CM

## 2022-07-05 DIAGNOSIS — D50.0 IRON DEFICIENCY ANEMIA DUE TO CHRONIC BLOOD LOSS: ICD-10-CM

## 2022-07-05 DIAGNOSIS — R19.5 POSITIVE FIT (FECAL IMMUNOCHEMICAL TEST): ICD-10-CM

## 2022-07-05 DIAGNOSIS — R19.5 DARK STOOLS: ICD-10-CM

## 2022-07-05 PROCEDURE — 99999 PR PBB SHADOW E&M-EST. PATIENT-LVL IV: CPT | Mod: PBBFAC,,,

## 2022-07-05 PROCEDURE — 99214 OFFICE O/P EST MOD 30 MIN: CPT | Mod: PBBFAC,PN

## 2022-07-05 PROCEDURE — 99999 PR PBB SHADOW E&M-EST. PATIENT-LVL IV: ICD-10-PCS | Mod: PBBFAC,,,

## 2022-07-05 PROCEDURE — 99203 PR OFFICE/OUTPT VISIT, NEW, LEVL III, 30-44 MIN: ICD-10-PCS | Mod: S$PBB,,,

## 2022-07-05 PROCEDURE — 99203 OFFICE O/P NEW LOW 30 MIN: CPT | Mod: S$PBB,,,

## 2022-07-05 NOTE — PROGRESS NOTES
Subjective:       Patient ID: Vel Banda is a 89 y.o. male Body mass index is 29.25 kg/m².    Chief Complaint: Anemia    This patient is new to me.  Referring Provider: Dr. Medrano for URIEL due to chronic blood loss.       GI Problem  The primary symptoms include melena (started after taking oral iron supplements; reports using pepto bismol seldomly and takes ASA 81 mg). Primary symptoms do not include fever, weight loss, fatigue, abdominal pain, nausea, vomiting, diarrhea, hematemesis, jaundice, hematochezia or rash.   The illness is also significant for constipation (well controlled; reports having a bowel movement every 1-2 days; denies straining). The illness does not include chills, anorexia, dysphagia, odynophagia or bloating. Significant associated medical issues include PUD. Associated medical issues do not include inflammatory bowel disease, GERD, gallstones, liver disease, alcohol abuse, gastric bypass, bowel resection, irritable bowel syndrome, hemorrhoids or diverticulitis.   Anemia  Symptoms include bruises/bleeds easily. There has been no abdominal pain, anorexia, confusion, fever, leg swelling, light-headedness, malaise/fatigue, pallor, palpitations, paresthesias, pica or weight loss. Signs of blood loss that are present include melena (started after taking oral iron supplements; reports using pepto bismol seldomly and takes ASA 81 mg). Signs of blood loss that are not present include hematemesis and hematochezia. (Reports having nose bleed a couple days ago and saw a drop of blood)   Past treatments include oral iron supplements. There is no history of alcohol abuse, cancer, chronic liver disease, chronic renal disease, clotting disorder, dementia, heart failure, hemoglobinopathy, HIV/AIDS, hypothyroidism, inflammatory bowel disease, malabsorption, neuropathy, recent illness, recent surgery, recent trauma or rheumatic disease. Procedure history includes FOBT (positive FIT test 06/14/22). There is  no past history of bone marrow exam.     Review of Systems   Constitutional: Negative for activity change, appetite change, chills, diaphoresis, fatigue, fever, malaise/fatigue, unexpected weight change and weight loss.   HENT: Negative for sore throat and trouble swallowing.    Respiratory: Negative for cough, choking and shortness of breath.    Cardiovascular: Negative for chest pain and palpitations.   Gastrointestinal: Positive for constipation (well controlled; reports having a bowel movement every 1-2 days; denies straining) and melena (started after taking oral iron supplements; reports using pepto bismol seldomly and takes ASA 81 mg). Negative for abdominal distention, abdominal pain, anal bleeding, anorexia, bloating, blood in stool, diarrhea, dysphagia, hematemesis, hematochezia, jaundice, nausea, rectal pain and vomiting.   Genitourinary: Negative for hematuria.   Skin: Negative for pallor and rash.   Neurological: Negative for light-headedness and paresthesias.   Hematological: Bruises/bleeds easily.   Psychiatric/Behavioral: Negative for confusion.       No LMP for male patient.  Past Medical History:   Diagnosis Date    Actinic keratosis     Benign prostatic hyperplasia     Chronic kidney disease     Stage 3     Fibromyositis     Gouty arthropathy     Hyperlipidemia     Hypertension     Osteoarthritis of knee      Past Surgical History:   Procedure Laterality Date    APPENDECTOMY      cataractsx2  2011    COLONOSCOPY       Family History   Problem Relation Age of Onset    Hypertension Mother     Coronary artery disease Mother     Colon cancer Neg Hx     Crohn's disease Neg Hx     Ulcerative colitis Neg Hx     Stomach cancer Neg Hx     Esophageal cancer Neg Hx      Social History     Tobacco Use    Smoking status: Former Smoker     Types: Cigarettes     Quit date:      Years since quittin.5    Smokeless tobacco: Never Used   Substance Use Topics    Alcohol use: Yes      Comment: occasionally    Drug use: Never     Wt Readings from Last 10 Encounters:   07/05/22 84.7 kg (186 lb 11.7 oz)   06/16/22 86.2 kg (190 lb)   05/17/22 86.6 kg (191 lb)   12/09/21 84.8 kg (187 lb)   01/14/21 87.6 kg (193 lb 3.2 oz)   04/13/14 83.9 kg (185 lb)     Lab Results   Component Value Date    WBC 6.58 06/16/2022    HGB 11.0 (L) 06/16/2022    HCT 35.4 (L) 06/16/2022    MCV 82 06/16/2022     06/16/2022     CMP  Sodium   Date Value Ref Range Status   05/17/2022 140 136 - 145 mmol/L Final     Potassium   Date Value Ref Range Status   05/17/2022 4.5 3.5 - 5.1 mmol/L Final     Chloride   Date Value Ref Range Status   05/17/2022 109 95 - 110 mmol/L Final     CO2   Date Value Ref Range Status   05/17/2022 22 (L) 23 - 29 mmol/L Final     Glucose   Date Value Ref Range Status   05/17/2022 101 70 - 110 mg/dL Final     BUN   Date Value Ref Range Status   05/17/2022 31 (H) 10 - 30 mg/dL Final     Creatinine   Date Value Ref Range Status   05/17/2022 1.6 (H) 0.5 - 1.4 mg/dL Final     Calcium   Date Value Ref Range Status   05/17/2022 8.7 8.7 - 10.5 mg/dL Final     Total Protein   Date Value Ref Range Status   05/17/2022 6.7 6.0 - 8.4 g/dL Final     Albumin   Date Value Ref Range Status   05/17/2022 3.7 3.5 - 5.2 g/dL Final     Total Bilirubin   Date Value Ref Range Status   05/17/2022 0.3 0.1 - 1.0 mg/dL Final     Comment:     For infants and newborns, interpretation of results should be based  on gestational age, weight and in agreement with clinical  observations.    Premature Infant recommended reference ranges:  Up to 24 hours.............<8.0 mg/dL  Up to 48 hours............<12.0 mg/dL  3-5 days..................<15.0 mg/dL  6-29 days.................<15.0 mg/dL       Alkaline Phosphatase   Date Value Ref Range Status   05/17/2022 80 55 - 135 U/L Final     AST   Date Value Ref Range Status   05/17/2022 16 10 - 40 U/L Final     ALT   Date Value Ref Range Status   05/17/2022 14 10 - 44 U/L Final      Anion Gap   Date Value Ref Range Status   05/17/2022 9 8 - 16 mmol/L Final     eGFR if    Date Value Ref Range Status   05/17/2022 42 (A) >60 mL/min/1.73 m^2 Final     eGFR if non    Date Value Ref Range Status   05/17/2022 36 (A) >60 mL/min/1.73 m^2 Final     Comment:     Calculation used to obtain the estimated glomerular filtration  rate (eGFR) is the CKD-EPI equation.        Lab Results   Component Value Date    IRON 218 (H) 06/16/2022    TIBC 379 06/16/2022    FERRITIN 8 (L) 12/09/2021       Reviewed prior medical records including visit note with Dr. Medrano 06/16/22 (see surgical history).    Objective:      Physical Exam  Vitals and nursing note reviewed.   Constitutional:       General: He is not in acute distress.     Appearance: Normal appearance. He is normal weight. He is not ill-appearing.   HENT:      Mouth/Throat:      Lips: Pink.      Mouth: Mucous membranes are moist.   Eyes:      Extraocular Movements: Extraocular movements intact.      Pupils: Pupils are equal, round, and reactive to light.   Cardiovascular:      Rate and Rhythm: Normal rate and regular rhythm.      Heart sounds: Normal heart sounds.   Pulmonary:      Effort: Pulmonary effort is normal. No respiratory distress.      Breath sounds: Normal breath sounds.   Abdominal:      General: Abdomen is flat. Bowel sounds are normal. There is no distension or abdominal bruit. There are no signs of injury.      Palpations: Abdomen is soft. There is no shifting dullness, fluid wave, hepatomegaly, splenomegaly or mass.      Tenderness: There is no abdominal tenderness. There is no guarding or rebound. Negative signs include Nguyễn's sign, Rovsing's sign and McBurney's sign.      Hernia: No hernia is present.   Skin:     General: Skin is warm and dry.      Coloration: Skin is not jaundiced or pale.      Findings: Bruising present.   Neurological:      Mental Status: He is alert and oriented to person, place, and  time.   Psychiatric:         Attention and Perception: Attention normal.         Mood and Affect: Mood normal.         Speech: Speech normal.         Behavior: Behavior normal.         Assessment:       1. Iron deficiency anemia due to chronic blood loss    2. Positive FIT (fecal immunochemical test)    3. Dark stools    4. Easy bruising    5. History of constipation        Plan:       Iron deficiency anemia due to chronic blood loss  - schedule EGD, discussed procedure with patient, including risks and benefits, patient verbalized understanding  - schedule Colonoscopy, discussed procedure with the patient, including risks and benefits, patient verbalized understanding  - discussed with patient the different ways that anemia occurs: blood loss (such as from the gi tract), the body is not making enough, or the body is breaking down the rbcs too quickly; recommend EGD and colonoscopy to further evaluate gi tract for possible blood loss and pending results of endoscopies, possible UGI with Small Bowel Follow Through/video capsule study  -follow-up with PCP and/or hematology for continued evaluation and management  -Continue oral iron as prescribed    Positive FIT (fecal immunochemical test)  - schedule Colonoscopy, discussed procedure with the patient, including risks and benefits, patient verbalized understanding    Dark stools  - schedule EGD, discussed procedure with patient, including risks and benefits, patient verbalized understanding    Easy bruising  -Follow-up with PCP and/or hematology for further evaluation and management     History of constipation  - schedule Colonoscopy, discussed procedure with the patient, including risks and benefits, patient verbalized understanding  -Recommend daily exercise as tolerated, adequate water intake (six 8-oz glasses of water daily), and high fiber diet. OTC fiber supplements are recommended if diet does not reach daily fiber goal (20-30 grams daily), such as Metamucil,  Citrucel, or FiberCon (take as directed, separate from other oral medications by >2 hours).  -Recommend taking an OTC stool softener such as Colace as directed to avoid hard stools and straining with bowel movements PRN  -Recommend trying OTC MiraLax once daily (17g PO) as directed  - If no improvement with above recommendations, try intermittently dosed Dulcolax OTC as directed (every 3-4  days) PRN to facilitate bowel movements  -If still no improvement with these measures, call/follow-up    Follow up in about 4 weeks (around 8/2/2022), or if symptoms worsen or fail to improve.      If no improvement in symptoms or symptoms worsen, call/follow-up at clinic or go to ER.        30 minutes of total time spent on the encounter, which includes face to face time and non-face to face time preparing to see the patient (eg, review of tests), Obtaining and/or reviewing separately obtained history, Documenting clinical information in the electronic or other health record, Independently interpreting results (not separately reported) and communicating results to the patient/family/caregiver, or Care coordination (not separately reported).

## 2022-07-14 ENCOUNTER — OFFICE VISIT (OUTPATIENT)
Dept: FAMILY MEDICINE | Facility: CLINIC | Age: 87
End: 2022-07-14
Payer: MEDICARE

## 2022-07-14 VITALS
SYSTOLIC BLOOD PRESSURE: 122 MMHG | DIASTOLIC BLOOD PRESSURE: 78 MMHG | HEART RATE: 61 BPM | HEIGHT: 67 IN | OXYGEN SATURATION: 97 % | BODY MASS INDEX: 29.03 KG/M2 | WEIGHT: 185 LBS | TEMPERATURE: 98 F

## 2022-07-14 DIAGNOSIS — N18.32 STAGE 3B CHRONIC KIDNEY DISEASE: ICD-10-CM

## 2022-07-14 DIAGNOSIS — I10 ESSENTIAL HYPERTENSION: ICD-10-CM

## 2022-07-14 DIAGNOSIS — D50.0 IRON DEFICIENCY ANEMIA DUE TO CHRONIC BLOOD LOSS: Primary | ICD-10-CM

## 2022-07-14 PROCEDURE — 99213 PR OFFICE/OUTPT VISIT, EST, LEVL III, 20-29 MIN: ICD-10-PCS | Mod: S$PBB,,, | Performed by: FAMILY MEDICINE

## 2022-07-14 PROCEDURE — 99999 PR PBB SHADOW E&M-EST. PATIENT-LVL III: CPT | Mod: PBBFAC,,, | Performed by: FAMILY MEDICINE

## 2022-07-14 PROCEDURE — 99213 OFFICE O/P EST LOW 20 MIN: CPT | Mod: PBBFAC,PN | Performed by: FAMILY MEDICINE

## 2022-07-14 PROCEDURE — 99999 PR PBB SHADOW E&M-EST. PATIENT-LVL III: ICD-10-PCS | Mod: PBBFAC,,, | Performed by: FAMILY MEDICINE

## 2022-07-14 PROCEDURE — 99213 OFFICE O/P EST LOW 20 MIN: CPT | Mod: S$PBB,,, | Performed by: FAMILY MEDICINE

## 2022-07-14 NOTE — PROGRESS NOTES
SUBJECTIVE:    Patient ID: Vel Banda is a 89 y.o. male.    Chief Complaint: Follow-up and Hypertension (Pt here for 2 month check up/See labs/Pt seen Ms.AlveroGastro.NP on 7-5.pt is scheduled for Endoscopy on 8-1)    Follow-up on this 89-year-old male with a profound anemia last visit.  It was iron deficiency and with replacement iron supplements now has greatly increased.    Decrteased weight 6 lbs    He was placed on iron supplementation 325 mg twice a day.  He is only able to take it once a day.  He does report feeling better.  Family member reports less sleeping and napping during the day.    Hemoglobin has increased significantly to a hemoglobin of 11.  Reticulocyte count does show response to therapy at 2.7%.  Iron levels as significantly increased to above normal with high saturation.    Has seen gastroenterology and is scheduled for endoscopy August 1st.        Lab Visit on 06/16/2022   Component Date Value Ref Range Status    WBC 06/16/2022 6.58  3.90 - 12.70 K/uL Final    RBC 06/16/2022 4.32 (A) 4.60 - 6.20 M/uL Final    Hemoglobin 06/16/2022 11.0 (A) 14.0 - 18.0 g/dL Final    Hematocrit 06/16/2022 35.4 (A) 40.0 - 54.0 % Final    MCV 06/16/2022 82  82 - 98 fL Final    MCH 06/16/2022 25.5 (A) 27.0 - 31.0 pg Final    MCHC 06/16/2022 31.1 (A) 32.0 - 36.0 g/dL Final    RDW 06/16/2022 SEE COMMENT  11.5 - 14.5 % Final    Platelets 06/16/2022 225  150 - 450 K/uL Final    MPV 06/16/2022 10.5  9.2 - 12.9 fL Final    Immature Granulocytes 06/16/2022 0.5  0.0 - 0.5 % Final    Gran # (ANC) 06/16/2022 4.2  1.8 - 7.7 K/uL Final    Immature Grans (Abs) 06/16/2022 0.03  0.00 - 0.04 K/uL Final    Lymph # 06/16/2022 1.4  1.0 - 4.8 K/uL Final    Mono # 06/16/2022 0.6  0.3 - 1.0 K/uL Final    Eos # 06/16/2022 0.2  0.0 - 0.5 K/uL Final    Baso # 06/16/2022 0.10  0.00 - 0.20 K/uL Final    nRBC 06/16/2022 0  0 /100 WBC Final    Gran % 06/16/2022 63.8  38.0 - 73.0 % Final    Lymph % 06/16/2022 21.9   18.0 - 48.0 % Final    Mono % 06/16/2022 9.0  4.0 - 15.0 % Final    Eosinophil % 06/16/2022 3.3  0.0 - 8.0 % Final    Basophil % 06/16/2022 1.5  0.0 - 1.9 % Final    Platelet Estimate 06/16/2022 Appears normal   Final    Aniso 06/16/2022 Moderate   Final    Poik 06/16/2022 Slight   Final    Poly 06/16/2022 Occasional   Final    Hypo 06/16/2022 Occasional   Final    Ovalocytes 06/16/2022 Occasional   Final    Tear Drop Cells 06/16/2022 Occasional   Final    Schistocytes 06/16/2022 Present   Final    Fragmented Cells 06/16/2022 Occasional   Final    Differential Method 06/16/2022 Automated   Final    Retic 06/16/2022 2.7 (A) 0.4 - 2.0 % Final    Iron 06/16/2022 218 (A) 45 - 160 ug/dL Final    Transferrin 06/16/2022 256  200 - 375 mg/dL Final    TIBC 06/16/2022 379  250 - 450 ug/dL Final    Saturated Iron 06/16/2022 58 (A) 20 - 50 % Final   Lab Visit on 06/09/2022   Component Date Value Ref Range Status    Fecal Immunochemical Test (iFOBT) 06/14/2022 Positive (A) Negative Final   Lab Visit on 05/17/2022   Component Date Value Ref Range Status    WBC 05/17/2022 5.70  3.90 - 12.70 K/uL Final    RBC 05/17/2022 3.35 (A) 4.60 - 6.20 M/uL Final    Hemoglobin 05/17/2022 6.4 (A) 14.0 - 18.0 g/dL Final    Hematocrit 05/17/2022 23.3 (A) 40.0 - 54.0 % Final    MCV 05/17/2022 70 (A) 82 - 98 fL Final    MCH 05/17/2022 19.1 (A) 27.0 - 31.0 pg Final    MCHC 05/17/2022 27.5 (A) 32.0 - 36.0 g/dL Final    RDW 05/17/2022 19.7 (A) 11.5 - 14.5 % Final    Platelets 05/17/2022 262  150 - 450 K/uL Final    MPV 05/17/2022 10.3  9.2 - 12.9 fL Final    Immature Granulocytes 05/17/2022 0.2  0.0 - 0.5 % Final    Gran # (ANC) 05/17/2022 3.3  1.8 - 7.7 K/uL Final    Immature Grans (Abs) 05/17/2022 0.01  0.00 - 0.04 K/uL Final    Lymph # 05/17/2022 1.5  1.0 - 4.8 K/uL Final    Mono # 05/17/2022 0.6  0.3 - 1.0 K/uL Final    Eos # 05/17/2022 0.2  0.0 - 0.5 K/uL Final    Baso # 05/17/2022 0.05  0.00 - 0.20 K/uL Final     nRBC 05/17/2022 0  0 /100 WBC Final    Gran % 05/17/2022 58.2  38.0 - 73.0 % Final    Lymph % 05/17/2022 26.1  18.0 - 48.0 % Final    Mono % 05/17/2022 10.9  4.0 - 15.0 % Final    Eosinophil % 05/17/2022 3.7  0.0 - 8.0 % Final    Basophil % 05/17/2022 0.9  0.0 - 1.9 % Final    Platelet Estimate 05/17/2022 Appears normal   Final    Aniso 05/17/2022 Slight   Final    Poik 05/17/2022 Slight   Final    Poly 05/17/2022 Occasional   Final    Hypo 05/17/2022 Occasional   Final    Ovalocytes 05/17/2022 Occasional   Final    Tear Drop Cells 05/17/2022 Occasional   Final    Differential Method 05/17/2022 Automated   Final    Retic 05/17/2022 2.1 (A) 0.4 - 2.0 % Final    Sodium 05/17/2022 140  136 - 145 mmol/L Final    Potassium 05/17/2022 4.5  3.5 - 5.1 mmol/L Final    Chloride 05/17/2022 109  95 - 110 mmol/L Final    CO2 05/17/2022 22 (A) 23 - 29 mmol/L Final    Glucose 05/17/2022 101  70 - 110 mg/dL Final    BUN 05/17/2022 31 (A) 10 - 30 mg/dL Final    Creatinine 05/17/2022 1.6 (A) 0.5 - 1.4 mg/dL Final    Calcium 05/17/2022 8.7  8.7 - 10.5 mg/dL Final    Total Protein 05/17/2022 6.7  6.0 - 8.4 g/dL Final    Albumin 05/17/2022 3.7  3.5 - 5.2 g/dL Final    Total Bilirubin 05/17/2022 0.3  0.1 - 1.0 mg/dL Final    Alkaline Phosphatase 05/17/2022 80  55 - 135 U/L Final    AST 05/17/2022 16  10 - 40 U/L Final    ALT 05/17/2022 14  10 - 44 U/L Final    Anion Gap 05/17/2022 9  8 - 16 mmol/L Final    eGFR if  05/17/2022 42 (A) >60 mL/min/1.73 m^2 Final    eGFR if non  05/17/2022 36 (A) >60 mL/min/1.73 m^2 Final       Past Medical History:   Diagnosis Date    Actinic keratosis     Benign prostatic hyperplasia     Chronic kidney disease     Stage 3     Fibromyositis     Gouty arthropathy     Hyperlipidemia     Hypertension     Osteoarthritis of knee      Social History     Socioeconomic History    Marital status:    Tobacco Use    Smoking status:  Former Smoker     Types: Cigarettes     Quit date:      Years since quittin.5    Smokeless tobacco: Never Used   Substance and Sexual Activity    Alcohol use: Yes     Comment: occasionally    Drug use: Never     Past Surgical History:   Procedure Laterality Date    APPENDECTOMY      cataractsx2  2011    COLONOSCOPY       Family History   Problem Relation Age of Onset    Hypertension Mother     Coronary artery disease Mother     Colon cancer Neg Hx     Crohn's disease Neg Hx     Ulcerative colitis Neg Hx     Stomach cancer Neg Hx     Esophageal cancer Neg Hx        Review of patient's allergies indicates:   Allergen Reactions    Pcn [penicillins]        Current Outpatient Medications:     aspirin 81 MG Chew, Take 81 mg by mouth once daily., Disp: , Rfl:     cholestyramine, with sugar, 4 gram Powd, Take 1 Scoop by mouth once daily., Disp: 378 g, Rfl: 11    ferrous sulfate 325 (65 FE) MG EC tablet, Take 1 tablet (325 mg total) by mouth 2 (two) times daily. (Patient taking differently: Take 325 mg by mouth 2 (two) times daily. Taking 1 qd), Disp: 60 tablet, Rfl: 5    hydroCHLOROthiazide (MICROZIDE) 12.5 mg capsule, Take 1 capsule (12.5 mg total) by mouth once daily., Disp: 90 capsule, Rfl: 3    lisinopriL (PRINIVIL,ZESTRIL) 20 MG tablet, Take 1 tablet (20 mg total) by mouth once daily., Disp: 90 tablet, Rfl: 3    tamsulosin (FLOMAX) 0.4 mg Cap, Take 1 capsule (0.4 mg total) by mouth once daily., Disp: 90 capsule, Rfl: 3    travoprost, benzalkonium, (TRAVATAN) 0.004 % ophthalmic solution, 1 drop every evening., Disp: , Rfl:     allopurinoL (ZYLOPRIM) 300 MG tablet, Take 1 tablet (300 mg total) by mouth once daily. (Patient not taking: No sig reported), Disp: 90 tablet, Rfl: 3    Review of Systems   Constitutional:              Per HPI    Legs get weak when walking- no cramps           Objective:      Vitals:    22 1348   BP: 122/78   Pulse: 61   Temp: 98.4 °F (36.9 °C)   SpO2:  "97%   Weight: 83.9 kg (185 lb)   Height: 5' 7" (1.702 m)     Physical Exam  Vitals and nursing note reviewed.   Eyes:      Comments: Pale conjunctiva   Neck:      Vascular: No carotid bruit.   Cardiovascular:      Rate and Rhythm: Regular rhythm.      Comments:       Decreased  dorsalis pedis and posterior tibial peripheral pulses  Pulmonary:      Effort: Pulmonary effort is normal.      Breath sounds: Normal breath sounds.   Neurological:      General: No focal deficit present.      Mental Status: He is alert and oriented to person, place, and time.   Psychiatric:         Mood and Affect: Mood normal.         Behavior: Behavior normal.         Thought Content: Thought content normal.           Assessment:       1. Iron deficiency anemia due to chronic blood loss    2. Essential hypertension    3. Stage 3b chronic kidney disease         Plan:       Iron deficiency anemia due to chronic blood loss  -     CBC Auto Differential; Future; Expected date: 08/14/2022    Essential hypertension    Stage 3b chronic kidney disease      No follow-ups on file.        7/14/2022 Devon Medrano M.D.      "

## 2022-08-01 ENCOUNTER — ANESTHESIA EVENT (OUTPATIENT)
Dept: ENDOSCOPY | Facility: HOSPITAL | Age: 87
End: 2022-08-01
Payer: MEDICARE

## 2022-08-01 ENCOUNTER — HOSPITAL ENCOUNTER (OUTPATIENT)
Facility: HOSPITAL | Age: 87
Discharge: HOME | End: 2022-08-01
Attending: INTERNAL MEDICINE | Admitting: INTERNAL MEDICINE
Payer: MEDICARE

## 2022-08-01 ENCOUNTER — ANESTHESIA (OUTPATIENT)
Dept: ENDOSCOPY | Facility: HOSPITAL | Age: 87
End: 2022-08-01
Payer: MEDICARE

## 2022-08-01 DIAGNOSIS — D50.9 IRON DEFICIENCY ANEMIA: Primary | ICD-10-CM

## 2022-08-01 PROCEDURE — 37000009 HC ANESTHESIA EA ADD 15 MINS: Performed by: INTERNAL MEDICINE

## 2022-08-01 PROCEDURE — 45382 PR COLONOSCOPY,FLEX,W/CONTROL, BLEEDING: ICD-10-PCS | Mod: ,,, | Performed by: INTERNAL MEDICINE

## 2022-08-01 PROCEDURE — 27200959 HC CATHETER, ERBE, ARGON PLASMA: Performed by: INTERNAL MEDICINE

## 2022-08-01 PROCEDURE — 25000003 PHARM REV CODE 250: Performed by: NURSE ANESTHETIST, CERTIFIED REGISTERED

## 2022-08-01 PROCEDURE — 37000008 HC ANESTHESIA 1ST 15 MINUTES: Performed by: INTERNAL MEDICINE

## 2022-08-01 PROCEDURE — D9220A PRA ANESTHESIA: Mod: ANES,,, | Performed by: ANESTHESIOLOGY

## 2022-08-01 PROCEDURE — D9220A PRA ANESTHESIA: ICD-10-PCS | Mod: CRNA,,, | Performed by: NURSE ANESTHETIST, CERTIFIED REGISTERED

## 2022-08-01 PROCEDURE — D9220A PRA ANESTHESIA: Mod: CRNA,,, | Performed by: NURSE ANESTHETIST, CERTIFIED REGISTERED

## 2022-08-01 PROCEDURE — 27201012 HC FORCEPS, HOT/COLD, DISP: Performed by: INTERNAL MEDICINE

## 2022-08-01 PROCEDURE — 45382 COLONOSCOPY W/CONTROL BLEED: CPT | Mod: ,,, | Performed by: INTERNAL MEDICINE

## 2022-08-01 PROCEDURE — 43239 PR EGD, FLEX, W/BIOPSY, SGL/MULTI: ICD-10-PCS | Mod: 51,,, | Performed by: INTERNAL MEDICINE

## 2022-08-01 PROCEDURE — 43239 EGD BIOPSY SINGLE/MULTIPLE: CPT | Performed by: INTERNAL MEDICINE

## 2022-08-01 PROCEDURE — 45382 COLONOSCOPY W/CONTROL BLEED: CPT | Performed by: INTERNAL MEDICINE

## 2022-08-01 PROCEDURE — D9220A PRA ANESTHESIA: ICD-10-PCS | Mod: ANES,,, | Performed by: ANESTHESIOLOGY

## 2022-08-01 PROCEDURE — 88305 TISSUE EXAM BY PATHOLOGIST: CPT | Performed by: PATHOLOGY

## 2022-08-01 PROCEDURE — 43239 EGD BIOPSY SINGLE/MULTIPLE: CPT | Mod: 51,,, | Performed by: INTERNAL MEDICINE

## 2022-08-01 PROCEDURE — 25000003 PHARM REV CODE 250: Performed by: INTERNAL MEDICINE

## 2022-08-01 PROCEDURE — 63600175 PHARM REV CODE 636 W HCPCS: Performed by: NURSE ANESTHETIST, CERTIFIED REGISTERED

## 2022-08-01 PROCEDURE — 88305 TISSUE EXAM BY PATHOLOGIST: ICD-10-PCS | Mod: 26,,, | Performed by: PATHOLOGY

## 2022-08-01 PROCEDURE — 88305 TISSUE EXAM BY PATHOLOGIST: CPT | Mod: 26,,, | Performed by: PATHOLOGY

## 2022-08-01 RX ORDER — PROPOFOL 10 MG/ML
VIAL (ML) INTRAVENOUS
Status: DISCONTINUED | OUTPATIENT
Start: 2022-08-01 | End: 2022-08-01

## 2022-08-01 RX ORDER — LIDOCAINE HCL/PF 100 MG/5ML
SYRINGE (ML) INTRAVENOUS
Status: DISCONTINUED | OUTPATIENT
Start: 2022-08-01 | End: 2022-08-01

## 2022-08-01 RX ORDER — SODIUM CHLORIDE 9 MG/ML
INJECTION, SOLUTION INTRAVENOUS CONTINUOUS
Status: DISCONTINUED | OUTPATIENT
Start: 2022-08-01 | End: 2022-08-01 | Stop reason: HOSPADM

## 2022-08-01 RX ADMIN — PROPOFOL 20 MG: 10 INJECTION, EMULSION INTRAVENOUS at 02:08

## 2022-08-01 RX ADMIN — PROPOFOL 40 MG: 10 INJECTION, EMULSION INTRAVENOUS at 02:08

## 2022-08-01 RX ADMIN — SODIUM CHLORIDE: 0.9 INJECTION, SOLUTION INTRAVENOUS at 12:08

## 2022-08-01 RX ADMIN — PROPOFOL 80 MG: 10 INJECTION, EMULSION INTRAVENOUS at 02:08

## 2022-08-01 RX ADMIN — LIDOCAINE HYDROCHLORIDE 75 MG: 20 INJECTION INTRAVENOUS at 02:08

## 2022-08-01 NOTE — PROVATION PATIENT INSTRUCTIONS
Discharge Summary/Instructions after an Endoscopic Procedure  Patient Name: Vel  Oseas  Patient MRN: [id number]  Patient YOB: 1927 Monday, August 1, 2022  Edgar May MD  Dear patient,  As a result of recent federal legislation (The Federal Cures Act), you may   receive lab or pathology results from your procedure in your [facility]   account before your physician is able to contact you. Your physician or   their representative will relay the results to you with their   recommendations at their soonest availability.  Thank you,  RESTRICTIONS:  During your procedure today, you received medications for sedation.  These   medications may affect your judgment, balance and coordination.  Therefore,   for 24 hours, you have the following restrictions:   - DO NOT drive a car, operate machinery, make legal/financial decisions,   sign important papers or drink alcohol.    ACTIVITY:  Today: no heavy lifting, straining or running due to procedural   sedation/anesthesia.  The following day: return to full activity including work.  DIET:  Eat and drink normally unless instructed otherwise.     TREATMENT FOR COMMON SIDE EFFECTS:  - Mild abdominal pain, nausea, belching, bloating or excessive gas:  rest,   eat lightly and use a heating pad.  - Sore Throat: treat with throat lozenges and/or gargle with warm salt   water.  - Because air was used during the procedure, expelling large amounts of air   from your rectum or belching is normal.  - If a bowel prep was taken, you may not have a bowel movement for 1-3 days.    This is normal.  SYMPTOMS TO WATCH FOR AND REPORT TO YOUR PHYSICIAN:  1. Abdominal pain or bloating, other than gas cramps.  2. Chest pain.  3. Back pain.  4. Signs of infection such as: chills or fever occurring within 24 hours   after the procedure.  5. Rectal bleeding, which would show as bright red, maroon, or black stools.   (A tablespoon of blood from the rectum is not serious, especially  if   hemorrhoids are present.)  6. Vomiting.  7. Weakness or dizziness.  GO DIRECTLY TO THE NEAREST EMERGENCY ROOM IF YOU HAVE ANY OF THE FOLLOWING:      Difficulty breathing              Chills and/or fever over 101 F   Persistent vomiting and/or vomiting blood   Severe abdominal pain   Severe chest pain   Black, tarry stools   Bleeding- more than one tablespoon   Any other symptom or condition that you feel may need urgent attention  Your doctor recommends these additional instructions:  If any biopsies were taken, your doctors clinic will contact you in 1 to 2   weeks with any results.  - Discharge patient to home.   - Advance diet as tolerated.   - Continue present medications.   - Repeat colonoscopy to be discussed after studies are complete for   retreatment if URIEL persists    - Refer to a colo-rectal surgeon at the next available appointment.   - Written discharge instructions were provided to the patient.   - VCE as next step in evaluation  - Written discharge instructions were provided to the patient.  For questions, problems or results please call your physician - Edgar May MD at Work:  [phone].  [facility] EMERGENCY ROOM PHONE NUMBER: [phone]  IF A COMPLICATION OR EMERGENCY SITUATION ARISES AND YOU ARE UNABLE TO REACH   YOUR PHYSICIAN - GO DIRECTLY TO THE EMERGENCY ROOM.  Edgar May MD  8/1/2022 2:52:52 PM  This report has been verified and signed electronically.  Dear patient,  As a result of recent federal legislation (The Federal Cures Act), you may   receive lab or pathology results from your procedure in your [facility]   account before your physician is able to contact you. Your physician or   their representative will relay the results to you with their   recommendations at their soonest availability.  Thank you,  PROVATION

## 2022-08-01 NOTE — TRANSFER OF CARE
"Anesthesia Transfer of Care Note    Patient: Vel Banda    Procedure(s) Performed: Procedure(s) (LRB):  EGD (ESOPHAGOGASTRODUODENOSCOPY) (N/A)  COLONOSCOPY (N/A)    Patient location: PACU    Anesthesia Type: general    Transport from OR: Transported from OR on room air with adequate spontaneous ventilation    Post pain: adequate analgesia    Post assessment: no apparent anesthetic complications    Post vital signs: stable    Level of consciousness: awake    Nausea/Vomiting: no nausea/vomiting    Complications: none    Transfer of care protocol was followed      Last vitals:   Visit Vitals  BP (!) 169/74 (BP Location: Left arm, Patient Position: Lying)   Pulse 66   Temp 36.7 °C (98.1 °F) (Skin)   Resp 15   Ht 5' 7" (1.702 m)   Wt 81.6 kg (180 lb)   SpO2 99%   BMI 28.19 kg/m²     "

## 2022-08-01 NOTE — PROVATION PATIENT INSTRUCTIONS
Discharge Summary/Instructions after an Endoscopic Procedure  Patient Name: Vel  Oseas  Patient MRN: [id number]  Patient YOB: 1927 Monday, August 1, 2022  Edgar May MD  Dear patient,  As a result of recent federal legislation (The Federal Cures Act), you may   receive lab or pathology results from your procedure in your [facility]   account before your physician is able to contact you. Your physician or   their representative will relay the results to you with their   recommendations at their soonest availability.  Thank you,  RESTRICTIONS:  During your procedure today, you received medications for sedation.  These   medications may affect your judgment, balance and coordination.  Therefore,   for 24 hours, you have the following restrictions:   - DO NOT drive a car, operate machinery, make legal/financial decisions,   sign important papers or drink alcohol.    ACTIVITY:  Today: no heavy lifting, straining or running due to procedural   sedation/anesthesia.  The following day: return to full activity including work.  DIET:  Eat and drink normally unless instructed otherwise.     TREATMENT FOR COMMON SIDE EFFECTS:  - Mild abdominal pain, nausea, belching, bloating or excessive gas:  rest,   eat lightly and use a heating pad.  - Sore Throat: treat with throat lozenges and/or gargle with warm salt   water.  - Because air was used during the procedure, expelling large amounts of air   from your rectum or belching is normal.  - If a bowel prep was taken, you may not have a bowel movement for 1-3 days.    This is normal.  SYMPTOMS TO WATCH FOR AND REPORT TO YOUR PHYSICIAN:  1. Abdominal pain or bloating, other than gas cramps.  2. Chest pain.  3. Back pain.  4. Signs of infection such as: chills or fever occurring within 24 hours   after the procedure.  5. Rectal bleeding, which would show as bright red, maroon, or black stools.   (A tablespoon of blood from the rectum is not serious, especially  if   hemorrhoids are present.)  6. Vomiting.  7. Weakness or dizziness.  GO DIRECTLY TO THE NEAREST EMERGENCY ROOM IF YOU HAVE ANY OF THE FOLLOWING:      Difficulty breathing              Chills and/or fever over 101 F   Persistent vomiting and/or vomiting blood   Severe abdominal pain   Severe chest pain   Black, tarry stools   Bleeding- more than one tablespoon   Any other symptom or condition that you feel may need urgent attention  Your doctor recommends these additional instructions:  If any biopsies were taken, your doctors clinic will contact you in 1 to 2   weeks with any results.  - Discharge patient to home.   - Resume regular diet.   - Use Protonix (pantoprazole) 40 mg PO daily for 8 weeks.   - Repeat upper endoscopy in 8 weeks to evaluate the response to therapy.  For questions, problems or results please call your physician - Edgar May MD at Work:  [phone].  [facility], EMERGENCY ROOM PHONE NUMBER: [phone]  IF A COMPLICATION OR EMERGENCY SITUATION ARISES AND YOU ARE UNABLE TO REACH   YOUR PHYSICIAN - GO DIRECTLY TO THE EMERGENCY ROOM.  Edgar May MD  8/1/2022 2:16:44 PM  This report has been verified and signed electronically.  Dear patient,  As a result of recent federal legislation (The Federal Cures Act), you may   receive lab or pathology results from your procedure in your [facility]   account before your physician is able to contact you. Your physician or   their representative will relay the results to you with their   recommendations at their soonest availability.  Thank you,  PROVATION

## 2022-08-01 NOTE — ANESTHESIA PREPROCEDURE EVALUATION
2022  Vel Banda is a 89 y.o., male.      Pre-op Assessment    I have reviewed the Patient Summary Reports.     I have reviewed the Nursing Notes. I have reviewed the NPO Status.   I have reviewed the Medications.     Review of Systems  Anesthesia Hx:  No problems with previous Anesthesia Denies Hx of Anesthetic complications    Social:  Former Smoker Smoking Status: Former Smoker  Quit Smokin75  Smokeless Tobacco Status: Current User  Alcohol use: Yes, unspecified volume  Drug use: Never         Cardiovascular:   Hypertension Denies MI.  Denies CAD.    Denies CABG/stent.   Denies Angina. hyperlipidemia    Pulmonary:   Denies COPD.  Denies Asthma.  Denies Recent URI.    Renal/:   Chronic Renal Disease, CRI Stage 3 chronic kidney disease   Hepatic/GI:   Denies GERD. Denies Liver Disease.    Musculoskeletal:   Arthritis     Neurological:   Denies TIA. Denies CVA. Neuromuscular Disease,  Denies Seizures.    Endocrine:   Denies Diabetes. Denies Hypothyroidism.    Psych:   Denies Psychiatric History.          Physical Exam  General: Well nourished, Cooperative, Oriented and Flat Affect    Airway:  Mallampati: II / II  Mouth Opening: Normal  TM Distance: 4 - 6 cm  Tongue: Normal    Dental:  Intact    Chest/Lungs:  Clear to auscultation, Normal Respiratory Rate    Heart:  Rate: Normal  Rhythm: Regular Rhythm  Sounds: Normal        Anesthesia Plan  Type of Anesthesia, risks & benefits discussed:    Anesthesia Type: Gen Natural Airway  Intra-op Monitoring Plan: Standard ASA Monitors  Induction:  IV  Informed Consent: Informed consent signed with the Patient and all parties understand the risks and agree with anesthesia plan.  All questions answered.   ASA Score: 4    Ready For Surgery From Anesthesia Perspective.     .

## 2022-08-01 NOTE — H&P
History & Physical - Short Stay  Gastroenterology    SUBJECTIVE:     Procedure: Colonoscopy and EGD    Chief Complaint/Indication for Procedure: Iron Deficiency Anemia    PTA Medications   Medication Sig    allopurinoL (ZYLOPRIM) 300 MG tablet Take 1 tablet (300 mg total) by mouth once daily.    aspirin 81 MG Chew Take 81 mg by mouth once daily.    cholestyramine, with sugar, 4 gram Powd Take 1 Scoop by mouth once daily.    hydroCHLOROthiazide (MICROZIDE) 12.5 mg capsule Take 1 capsule (12.5 mg total) by mouth once daily.    lisinopriL (PRINIVIL,ZESTRIL) 20 MG tablet Take 1 tablet (20 mg total) by mouth once daily.    tamsulosin (FLOMAX) 0.4 mg Cap Take 1 capsule (0.4 mg total) by mouth once daily.    ferrous sulfate 325 (65 FE) MG EC tablet Take 1 tablet (325 mg total) by mouth 2 (two) times daily. (Patient taking differently: Take 325 mg by mouth 2 (two) times daily. Taking 1 qd)    travoprost, benzalkonium, (TRAVATAN) 0.004 % ophthalmic solution 1 drop every evening.       Review of patient's allergies indicates:   Allergen Reactions    Pcn [penicillins]         Past Medical History:   Diagnosis Date    Actinic keratosis     Benign prostatic hyperplasia     Chronic kidney disease     Stage 3     Fibromyositis     Gouty arthropathy     Hyperlipidemia     Hypertension     Osteoarthritis of knee      Past Surgical History:   Procedure Laterality Date    APPENDECTOMY      cataractsx2  2011    COLONOSCOPY       Family History   Problem Relation Age of Onset    Hypertension Mother     Coronary artery disease Mother     Colon cancer Neg Hx     Crohn's disease Neg Hx     Ulcerative colitis Neg Hx     Stomach cancer Neg Hx     Esophageal cancer Neg Hx      Social History     Tobacco Use    Smoking status: Former Smoker     Types: Cigarettes     Quit date:      Years since quittin.6    Smokeless tobacco: Current User     Types: Chew   Substance Use Topics    Alcohol use: Yes      Comment: occasionally    Drug use: Never         OBJECTIVE:     Vital Signs (Most Recent)  Temp: 98.1 °F (36.7 °C) (08/01/22 1226)  Pulse: 66 (08/01/22 1226)  Resp: 15 (08/01/22 1226)  BP: (!) 169/74 (08/01/22 1226)  SpO2: 99 % (08/01/22 1226)    Physical Exam:                                                       GENERAL:  Comfortable, in no acute distress.                                 HEENT EXAM:  Nonicteric.  No adenopathy.  Oropharynx is clear.               NECK:  Supple.                                                               LUNGS:  Clear.                                                               CARDIAC:  Regular rate and rhythm.  S1, S2.  No murmur.                      ABDOMEN:  Soft, positive bowel sounds, nontender.  No hepatosplenomegaly or masses.  No rebound or guarding.                                             EXTREMITIES:  No edema.     MENTAL STATUS:  Normal, alert and oriented.      ASSESSMENT/PLAN:     Assessment: Iron Deficiency Anemia    Plan: Colonoscopy and EGD

## 2022-08-02 ENCOUNTER — PATIENT MESSAGE (OUTPATIENT)
Dept: ENDOSCOPY | Facility: HOSPITAL | Age: 87
End: 2022-08-02
Payer: MEDICARE

## 2022-08-02 ENCOUNTER — TELEPHONE (OUTPATIENT)
Dept: GASTROENTEROLOGY | Facility: CLINIC | Age: 87
End: 2022-08-02
Payer: MEDICARE

## 2022-08-02 VITALS
WEIGHT: 180 LBS | TEMPERATURE: 98 F | BODY MASS INDEX: 28.25 KG/M2 | DIASTOLIC BLOOD PRESSURE: 67 MMHG | SYSTOLIC BLOOD PRESSURE: 148 MMHG | OXYGEN SATURATION: 98 % | HEIGHT: 67 IN | RESPIRATION RATE: 15 BRPM | HEART RATE: 54 BPM

## 2022-08-02 DIAGNOSIS — K62.89 RECTAL PAIN: Primary | ICD-10-CM

## 2022-08-02 RX ORDER — PANTOPRAZOLE SODIUM 40 MG/1
40 TABLET, DELAYED RELEASE ORAL DAILY
Qty: 30 TABLET | Refills: 3 | Status: SHIPPED | OUTPATIENT
Start: 2022-08-02 | End: 2022-10-10 | Stop reason: SDUPTHER

## 2022-08-03 ENCOUNTER — PATIENT MESSAGE (OUTPATIENT)
Dept: ENDOSCOPY | Facility: HOSPITAL | Age: 87
End: 2022-08-03
Payer: MEDICARE

## 2022-08-05 LAB
FINAL PATHOLOGIC DIAGNOSIS: NORMAL
GROSS: NORMAL
Lab: NORMAL

## 2022-08-10 RX ORDER — METRONIDAZOLE 500 MG/1
500 TABLET ORAL 3 TIMES DAILY
Qty: 42 TABLET | Refills: 0 | Status: SHIPPED | OUTPATIENT
Start: 2022-08-10 | End: 2022-08-24

## 2022-08-10 RX ORDER — BISMUTH SUBSALICYLATE 262 MG/1
1 TABLET ORAL 4 TIMES DAILY
Refills: 0 | COMMUNITY
Start: 2022-08-10 | End: 2022-08-24

## 2022-08-10 RX ORDER — PANTOPRAZOLE SODIUM 20 MG/1
20 TABLET, DELAYED RELEASE ORAL
Qty: 28 TABLET | Refills: 0 | Status: SHIPPED | OUTPATIENT
Start: 2022-08-10 | End: 2022-08-16 | Stop reason: ALTCHOICE

## 2022-08-10 RX ORDER — TETRACYCLINE HYDROCHLORIDE 500 MG/1
500 CAPSULE ORAL 4 TIMES DAILY
Qty: 56 CAPSULE | Refills: 0 | Status: SHIPPED | OUTPATIENT
Start: 2022-08-10 | End: 2022-08-24

## 2022-08-15 ENCOUNTER — LAB VISIT (OUTPATIENT)
Dept: LAB | Facility: CLINIC | Age: 87
End: 2022-08-15
Payer: MEDICARE

## 2022-08-15 ENCOUNTER — TELEPHONE (OUTPATIENT)
Dept: GASTROENTEROLOGY | Facility: CLINIC | Age: 87
End: 2022-08-15
Payer: MEDICARE

## 2022-08-15 DIAGNOSIS — D50.0 IRON DEFICIENCY ANEMIA DUE TO CHRONIC BLOOD LOSS: ICD-10-CM

## 2022-08-15 LAB
BASOPHILS # BLD AUTO: 0.07 K/UL (ref 0–0.2)
BASOPHILS NFR BLD: 1 % (ref 0–1.9)
DIFFERENTIAL METHOD: ABNORMAL
EOSINOPHIL # BLD AUTO: 0.3 K/UL (ref 0–0.5)
EOSINOPHIL NFR BLD: 4.5 % (ref 0–8)
ERYTHROCYTE [DISTWIDTH] IN BLOOD BY AUTOMATED COUNT: 19.4 % (ref 11.5–14.5)
HCT VFR BLD AUTO: 41.8 % (ref 40–54)
HGB BLD-MCNC: 14.2 G/DL (ref 14–18)
IMM GRANULOCYTES # BLD AUTO: 0.01 K/UL (ref 0–0.04)
IMM GRANULOCYTES NFR BLD AUTO: 0.1 % (ref 0–0.5)
LYMPHOCYTES # BLD AUTO: 1.9 K/UL (ref 1–4.8)
LYMPHOCYTES NFR BLD: 27.8 % (ref 18–48)
MCH RBC QN AUTO: 30 PG (ref 27–31)
MCHC RBC AUTO-ENTMCNC: 34 G/DL (ref 32–36)
MCV RBC AUTO: 88 FL (ref 82–98)
MONOCYTES # BLD AUTO: 0.6 K/UL (ref 0.3–1)
MONOCYTES NFR BLD: 8.9 % (ref 4–15)
NEUTROPHILS # BLD AUTO: 4 K/UL (ref 1.8–7.7)
NEUTROPHILS NFR BLD: 57.7 % (ref 38–73)
NRBC BLD-RTO: 0 /100 WBC
PLATELET # BLD AUTO: 198 K/UL (ref 150–450)
PMV BLD AUTO: 10.5 FL (ref 9.2–12.9)
RBC # BLD AUTO: 4.74 M/UL (ref 4.6–6.2)
WBC # BLD AUTO: 6.87 K/UL (ref 3.9–12.7)

## 2022-08-15 PROCEDURE — 85025 COMPLETE CBC W/AUTO DIFF WBC: CPT | Performed by: FAMILY MEDICINE

## 2022-08-15 NOTE — TELEPHONE ENCOUNTER
Called to patient missed appointment , called to reschedule, patient states that his has an appointment with primary and will discuss follow up with him and get back to us.

## 2022-08-16 ENCOUNTER — OFFICE VISIT (OUTPATIENT)
Dept: FAMILY MEDICINE | Facility: CLINIC | Age: 87
End: 2022-08-16
Payer: MEDICARE

## 2022-08-16 VITALS
DIASTOLIC BLOOD PRESSURE: 80 MMHG | HEART RATE: 71 BPM | WEIGHT: 184 LBS | BODY MASS INDEX: 28.88 KG/M2 | HEIGHT: 67 IN | TEMPERATURE: 98 F | SYSTOLIC BLOOD PRESSURE: 130 MMHG | OXYGEN SATURATION: 98 %

## 2022-08-16 DIAGNOSIS — I10 ESSENTIAL HYPERTENSION: ICD-10-CM

## 2022-08-16 DIAGNOSIS — D50.0 IRON DEFICIENCY ANEMIA DUE TO CHRONIC BLOOD LOSS: Primary | ICD-10-CM

## 2022-08-16 PROCEDURE — 99213 OFFICE O/P EST LOW 20 MIN: CPT | Mod: S$PBB,,, | Performed by: FAMILY MEDICINE

## 2022-08-16 PROCEDURE — 99999 PR PBB SHADOW E&M-EST. PATIENT-LVL III: CPT | Mod: PBBFAC,,, | Performed by: FAMILY MEDICINE

## 2022-08-16 PROCEDURE — 99999 PR PBB SHADOW E&M-EST. PATIENT-LVL III: ICD-10-PCS | Mod: PBBFAC,,, | Performed by: FAMILY MEDICINE

## 2022-08-16 PROCEDURE — 99213 OFFICE O/P EST LOW 20 MIN: CPT | Mod: PBBFAC,PN | Performed by: FAMILY MEDICINE

## 2022-08-16 PROCEDURE — 99213 PR OFFICE/OUTPT VISIT, EST, LEVL III, 20-29 MIN: ICD-10-PCS | Mod: S$PBB,,, | Performed by: FAMILY MEDICINE

## 2022-08-16 RX ORDER — LISINOPRIL 20 MG/1
20 TABLET ORAL DAILY
Qty: 90 TABLET | Refills: 3 | Status: SHIPPED | OUTPATIENT
Start: 2022-08-16 | End: 2022-09-29 | Stop reason: SDUPTHER

## 2022-08-16 NOTE — PROGRESS NOTES
SUBJECTIVE:    Patient ID: Vel Banda is a 89 y.o. male.    Chief Complaint: Follow-up and Hypertension (See labs)    Follow-up on 89-year-old male who had chronic blood loss  anemia.    The source of bleeding was found to be a  stomach ulcer positive for H pylori.  He was given tetracycline, metronidazole, pantoprazole, and bismuth.  It is questionable if he is compliant with this regimen.  He has noticed no further blood loss..  CBC shows hemoglobin returned to normal of 14 from a low of 6.4.    Has repeat the gastro scheduled to assess healing status of ulcer.        Lab Visit on 08/15/2022   Component Date Value Ref Range Status    WBC 08/15/2022 6.87  3.90 - 12.70 K/uL Final    RBC 08/15/2022 4.74  4.60 - 6.20 M/uL Final    Hemoglobin 08/15/2022 14.2  14.0 - 18.0 g/dL Final    Hematocrit 08/15/2022 41.8  40.0 - 54.0 % Final    MCV 08/15/2022 88  82 - 98 fL Final    MCH 08/15/2022 30.0  27.0 - 31.0 pg Final    MCHC 08/15/2022 34.0  32.0 - 36.0 g/dL Final    RDW 08/15/2022 19.4 (A) 11.5 - 14.5 % Final    Platelets 08/15/2022 198  150 - 450 K/uL Final    MPV 08/15/2022 10.5  9.2 - 12.9 fL Final    Immature Granulocytes 08/15/2022 0.1  0.0 - 0.5 % Final    Gran # (ANC) 08/15/2022 4.0  1.8 - 7.7 K/uL Final    Immature Grans (Abs) 08/15/2022 0.01  0.00 - 0.04 K/uL Final    Lymph # 08/15/2022 1.9  1.0 - 4.8 K/uL Final    Mono # 08/15/2022 0.6  0.3 - 1.0 K/uL Final    Eos # 08/15/2022 0.3  0.0 - 0.5 K/uL Final    Baso # 08/15/2022 0.07  0.00 - 0.20 K/uL Final    nRBC 08/15/2022 0  0 /100 WBC Final    Gran % 08/15/2022 57.7  38.0 - 73.0 % Final    Lymph % 08/15/2022 27.8  18.0 - 48.0 % Final    Mono % 08/15/2022 8.9  4.0 - 15.0 % Final    Eosinophil % 08/15/2022 4.5  0.0 - 8.0 % Final    Basophil % 08/15/2022 1.0  0.0 - 1.9 % Final    Differential Method 08/15/2022 Automated   Final   Admission on 08/01/2022, Discharged on 08/01/2022   Component Date Value Ref Range Status    Final  "Pathologic Diagnosis 08/01/2022    Final                    Value:1. 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  Numerous Helicobacter pylori organisms are identified.  Focal intestinal metaplasia is identified.  Negative for dysplasia.      Gross 08/01/2022    Final                    Value:Container Label: Clinic Number/AP Number:  [id number], and "gastric Bx"  Received in formalin are 4 soft tan-white tissue fragments ranging from 2-5  mm in greatest dimension.  Specimen is stained with Hematoxylin and entirely  submitted in [facility].  CORINNE Lee.      Disclaimer 08/01/2022    Final                    Value:Unless the case is a 'gross only' or additional testing only, the final  diagnosis for each specimen is based on a microscopic examination of  appropriate tissue sections.     Lab Visit on 06/16/2022   Component Date Value Ref Range Status    WBC 06/16/2022 6.58  3.90 - 12.70 K/uL Final    RBC 06/16/2022 4.32 (A) 4.60 - 6.20 M/uL Final    Hemoglobin 06/16/2022 11.0 (A) 14.0 - 18.0 g/dL Final    Hematocrit 06/16/2022 35.4 (A) 40.0 - 54.0 % Final    MCV 06/16/2022 82  82 - 98 fL Final    MCH 06/16/2022 25.5 (A) 27.0 - 31.0 pg Final    MCHC 06/16/2022 31.1 (A) 32.0 - 36.0 g/dL Final    RDW 06/16/2022 SEE COMMENT  11.5 - 14.5 % Final    Platelets 06/16/2022 225  150 - 450 K/uL Final    MPV 06/16/2022 10.5  9.2 - 12.9 fL Final    Immature Granulocytes 06/16/2022 0.5  0.0 - 0.5 % Final    Gran # (ANC) 06/16/2022 4.2  1.8 - 7.7 K/uL Final    Immature Grans (Abs) 06/16/2022 0.03  0.00 - 0.04 K/uL Final    Lymph # 06/16/2022 1.4  1.0 - 4.8 K/uL Final    Mono # 06/16/2022 0.6  0.3 - 1.0 K/uL Final    Eos # 06/16/2022 0.2  0.0 - 0.5 K/uL Final    Baso # 06/16/2022 0.10  0.00 - 0.20 K/uL Final    nRBC 06/16/2022 0  0 /100 WBC Final    Gran % 06/16/2022 63.8  38.0 - 73.0 % Final    Lymph % 06/16/2022 21.9  18.0 - 48.0 % Final    " Mono % 06/16/2022 9.0  4.0 - 15.0 % Final    Eosinophil % 06/16/2022 3.3  0.0 - 8.0 % Final    Basophil % 06/16/2022 1.5  0.0 - 1.9 % Final    Platelet Estimate 06/16/2022 Appears normal   Final    Aniso 06/16/2022 Moderate   Final    Poik 06/16/2022 Slight   Final    Poly 06/16/2022 Occasional   Final    Hypo 06/16/2022 Occasional   Final    Ovalocytes 06/16/2022 Occasional   Final    Tear Drop Cells 06/16/2022 Occasional   Final    Schistocytes 06/16/2022 Present   Final    Fragmented Cells 06/16/2022 Occasional   Final    Differential Method 06/16/2022 Automated   Final    Retic 06/16/2022 2.7 (A) 0.4 - 2.0 % Final    Iron 06/16/2022 218 (A) 45 - 160 ug/dL Final    Transferrin 06/16/2022 256  200 - 375 mg/dL Final    TIBC 06/16/2022 379  250 - 450 ug/dL Final    Saturated Iron 06/16/2022 58 (A) 20 - 50 % Final   Lab Visit on 06/09/2022   Component Date Value Ref Range Status    Fecal Immunochemical Test (iFOBT) 06/14/2022 Positive (A) Negative Final   Lab Visit on 05/17/2022   Component Date Value Ref Range Status    WBC 05/17/2022 5.70  3.90 - 12.70 K/uL Final    RBC 05/17/2022 3.35 (A) 4.60 - 6.20 M/uL Final    Hemoglobin 05/17/2022 6.4 (A) 14.0 - 18.0 g/dL Final    Hematocrit 05/17/2022 23.3 (A) 40.0 - 54.0 % Final    MCV 05/17/2022 70 (A) 82 - 98 fL Final    MCH 05/17/2022 19.1 (A) 27.0 - 31.0 pg Final    MCHC 05/17/2022 27.5 (A) 32.0 - 36.0 g/dL Final    RDW 05/17/2022 19.7 (A) 11.5 - 14.5 % Final    Platelets 05/17/2022 262  150 - 450 K/uL Final    MPV 05/17/2022 10.3  9.2 - 12.9 fL Final    Immature Granulocytes 05/17/2022 0.2  0.0 - 0.5 % Final    Gran # (ANC) 05/17/2022 3.3  1.8 - 7.7 K/uL Final    Immature Grans (Abs) 05/17/2022 0.01  0.00 - 0.04 K/uL Final    Lymph # 05/17/2022 1.5  1.0 - 4.8 K/uL Final    Mono # 05/17/2022 0.6  0.3 - 1.0 K/uL Final    Eos # 05/17/2022 0.2  0.0 - 0.5 K/uL Final    Baso # 05/17/2022 0.05  0.00 - 0.20 K/uL Final    nRBC 05/17/2022 0  0  /100 WBC Final    Gran % 05/17/2022 58.2  38.0 - 73.0 % Final    Lymph % 05/17/2022 26.1  18.0 - 48.0 % Final    Mono % 05/17/2022 10.9  4.0 - 15.0 % Final    Eosinophil % 05/17/2022 3.7  0.0 - 8.0 % Final    Basophil % 05/17/2022 0.9  0.0 - 1.9 % Final    Platelet Estimate 05/17/2022 Appears normal   Final    Aniso 05/17/2022 Slight   Final    Poik 05/17/2022 Slight   Final    Poly 05/17/2022 Occasional   Final    Hypo 05/17/2022 Occasional   Final    Ovalocytes 05/17/2022 Occasional   Final    Tear Drop Cells 05/17/2022 Occasional   Final    Differential Method 05/17/2022 Automated   Final    Retic 05/17/2022 2.1 (A) 0.4 - 2.0 % Final    Sodium 05/17/2022 140  136 - 145 mmol/L Final    Potassium 05/17/2022 4.5  3.5 - 5.1 mmol/L Final    Chloride 05/17/2022 109  95 - 110 mmol/L Final    CO2 05/17/2022 22 (A) 23 - 29 mmol/L Final    Glucose 05/17/2022 101  70 - 110 mg/dL Final    BUN 05/17/2022 31 (A) 10 - 30 mg/dL Final    Creatinine 05/17/2022 1.6 (A) 0.5 - 1.4 mg/dL Final    Calcium 05/17/2022 8.7  8.7 - 10.5 mg/dL Final    Total Protein 05/17/2022 6.7  6.0 - 8.4 g/dL Final    Albumin 05/17/2022 3.7  3.5 - 5.2 g/dL Final    Total Bilirubin 05/17/2022 0.3  0.1 - 1.0 mg/dL Final    Alkaline Phosphatase 05/17/2022 80  55 - 135 U/L Final    AST 05/17/2022 16  10 - 40 U/L Final    ALT 05/17/2022 14  10 - 44 U/L Final    Anion Gap 05/17/2022 9  8 - 16 mmol/L Final    eGFR if  05/17/2022 42 (A) >60 mL/min/1.73 m^2 Final    eGFR if non  05/17/2022 36 (A) >60 mL/min/1.73 m^2 Final       Past Medical History:   Diagnosis Date    Actinic keratosis     Benign prostatic hyperplasia     Chronic kidney disease     Stage 3     Fibromyositis     Gouty arthropathy     Hyperlipidemia     Hypertension     Osteoarthritis of knee      Social History     Socioeconomic History    Marital status:    Tobacco Use    Smoking status: Former Smoker     Types:  Cigarettes     Quit date:      Years since quittin.6    Smokeless tobacco: Current User     Types: Chew   Substance and Sexual Activity    Alcohol use: Yes     Comment: occasionally    Drug use: Never     Past Surgical History:   Procedure Laterality Date    APPENDECTOMY      cataractsx2  2011    COLONOSCOPY      COLONOSCOPY N/A 2022    Procedure: COLONOSCOPY;  Surgeon: Edgar May MD;  Location: [facility] ENDO;  Service: Endoscopy;  Laterality: N/A;    ESOPHAGOGASTRODUODENOSCOPY N/A 2022    Procedure: EGD (ESOPHAGOGASTRODUODENOSCOPY);  Surgeon: Edgar May MD;  Location: [facility] ENDO;  Service: Endoscopy;  Laterality: N/A;     Family History   Problem Relation Age of Onset    Hypertension Mother     Coronary artery disease Mother     Colon cancer Neg Hx     Crohn's disease Neg Hx     Ulcerative colitis Neg Hx     Stomach cancer Neg Hx     Esophageal cancer Neg Hx        Review of patient's allergies indicates:   Allergen Reactions    Pcn [penicillins]        Current Outpatient Medications:     allopurinoL (ZYLOPRIM) 300 MG tablet, Take 1 tablet (300 mg total) by mouth once daily., Disp: 90 tablet, Rfl: 3    aspirin 81 MG Chew, Take 81 mg by mouth once daily., Disp: , Rfl:     ferrous sulfate 325 (65 FE) MG EC tablet, Take 1 tablet (325 mg total) by mouth 2 (two) times daily. (Patient taking differently: Take 325 mg by mouth 2 (two) times daily. Taking 1 qd), Disp: 60 tablet, Rfl: 5    hydroCHLOROthiazide (MICROZIDE) 12.5 mg capsule, Take 1 capsule (12.5 mg total) by mouth once daily., Disp: 90 capsule, Rfl: 3    metroNIDAZOLE (FLAGYL) 500 MG tablet, Take 1 tablet (500 mg total) by mouth 3 (three) times daily. for 14 days, Disp: 42 tablet, Rfl: 0    pantoprazole (PROTONIX) 40 MG tablet, Take 1 tablet (40 mg total) by mouth once daily., Disp: 30 tablet, Rfl: 3    tamsulosin (FLOMAX) 0.4 mg Cap, Take 1 capsule (0.4 mg total) by mouth once daily., Disp: 90 capsule,  "Rfl: 3    tetracycline (ACHROMYCIN,SUMYCIN) 500 MG capsule, Take 1 capsule (500 mg total) by mouth 4 (four) times daily. for 14 days, Disp: 56 capsule, Rfl: 0    travoprost, benzalkonium, (TRAVATAN) 0.004 % ophthalmic solution, 1 drop every evening., Disp: , Rfl:     bismuth subsalicylate (BISMUTH) 262 mg Chew, Take 1 tablet (262 mg total) by mouth 4 (four) times daily. for 14 days (Patient not taking: Reported on 8/16/2022), Disp: , Rfl: 0    cholestyramine, with sugar, 4 gram Powd, Take 1 Scoop by mouth once daily. (Patient not taking: Reported on 8/16/2022), Disp: 378 g, Rfl: 11    lisinopriL (PRINIVIL,ZESTRIL) 20 MG tablet, Take 1 tablet (20 mg total) by mouth once daily., Disp: 90 tablet, Rfl: 3    Review of Systems        Objective:      Vitals:    08/16/22 1049   BP: 130/80   Pulse: 71   Temp: 98.1 °F (36.7 °C)   SpO2: 98%   Weight: 83.5 kg (184 lb)   Height: 5' 7" (1.702 m)     Physical Exam  Constitutional:       Comments:       Regular rate and rhythm.  No peripheral edema.  Conjunctiva are pink           Assessment:       1. Iron deficiency anemia due to chronic blood loss    2. Essential hypertension         Plan:       Iron deficiency anemia due to chronic blood loss    Essential hypertension  -     lisinopriL (PRINIVIL,ZESTRIL) 20 MG tablet; Take 1 tablet (20 mg total) by mouth once daily.  Dispense: 90 tablet; Refill: 3      Follow up in about 4 weeks (around 9/13/2022).      Continue Iron on Monday, Wednesday , Friday only  Bring all meds next visit  8/16/2022 Devon Medrano M.D.      "

## 2022-08-29 ENCOUNTER — PATIENT MESSAGE (OUTPATIENT)
Dept: FAMILY MEDICINE | Facility: CLINIC | Age: 87
End: 2022-08-29
Payer: MEDICARE

## 2022-08-29 ENCOUNTER — PATIENT MESSAGE (OUTPATIENT)
Dept: ENDOSCOPY | Facility: HOSPITAL | Age: 87
End: 2022-08-29
Payer: MEDICARE

## 2022-08-29 ENCOUNTER — TELEPHONE (OUTPATIENT)
Dept: FAMILY MEDICINE | Facility: CLINIC | Age: 87
End: 2022-08-29
Payer: MEDICARE

## 2022-08-29 ENCOUNTER — TELEPHONE (OUTPATIENT)
Dept: GASTROENTEROLOGY | Facility: CLINIC | Age: 87
End: 2022-08-29
Payer: MEDICARE

## 2022-08-29 NOTE — TELEPHONE ENCOUNTER
----- Message from Melanie Francisco sent at 8/29/2022 10:23 AM CDT -----  Contact: Helga  Type:  Needs Medical Advice    Who Called:  Helga     Symptoms (please be specific):  stool is dark     Would the patient rather a call back or a response via [facility]?  Call    Best Call Back Number:  [phone] (home)         Additional Information: Patient had a colonoscopy a month ago and is complaining of very dark stool       Please call to advise

## 2022-08-29 NOTE — TELEPHONE ENCOUNTER
Grandson states that his father is complaining of dark stools again. Otherwise he is feeling fine.  He was wanting to schedule clinic visit to assess. I was able to schedule him with BILLIE Medrano for 9/7, and I advised him that I was going to send a message to the provider as well for any recommendations.

## 2022-08-29 NOTE — TELEPHONE ENCOUNTER
Spoke to grandson,Dr Medrano not in on  Mondays,states he had a colonoscopy in august,by Dr.Veerisetty ALDRIDGE and has noticed  dark stool past 2 days. Referred back to GI

## 2022-08-30 NOTE — TELEPHONE ENCOUNTER
Also see other message         Edgar May MD  You 15 hours ago (10:10 PM)     If he continues to have dark stools, he will likely need to go to ED

## 2022-09-07 ENCOUNTER — OFFICE VISIT (OUTPATIENT)
Dept: GASTROENTEROLOGY | Facility: CLINIC | Age: 87
End: 2022-09-07
Payer: MEDICARE

## 2022-09-07 VITALS
HEART RATE: 77 BPM | BODY MASS INDEX: 28.86 KG/M2 | DIASTOLIC BLOOD PRESSURE: 67 MMHG | SYSTOLIC BLOOD PRESSURE: 150 MMHG | HEIGHT: 67 IN | WEIGHT: 183.88 LBS

## 2022-09-07 DIAGNOSIS — Z86.2 HISTORY OF ANEMIA: ICD-10-CM

## 2022-09-07 DIAGNOSIS — K20.90 ESOPHAGITIS DETERMINED BY ENDOSCOPY: ICD-10-CM

## 2022-09-07 DIAGNOSIS — K92.1 MELENA: Primary | ICD-10-CM

## 2022-09-07 DIAGNOSIS — Z87.19 HISTORY OF CONSTIPATION: ICD-10-CM

## 2022-09-07 DIAGNOSIS — K44.9 HIATAL HERNIA: ICD-10-CM

## 2022-09-07 DIAGNOSIS — Z87.19 HISTORY OF GASTRITIS: ICD-10-CM

## 2022-09-07 DIAGNOSIS — K25.9 GASTRIC ULCER, UNSPECIFIED CHRONICITY, UNSPECIFIED WHETHER GASTRIC ULCER HEMORRHAGE OR PERFORATION PRESENT: ICD-10-CM

## 2022-09-07 DIAGNOSIS — K27.9 PUD (PEPTIC ULCER DISEASE): ICD-10-CM

## 2022-09-07 PROCEDURE — 99214 OFFICE O/P EST MOD 30 MIN: CPT | Mod: PBBFAC,PN

## 2022-09-07 PROCEDURE — 99999 PR PBB SHADOW E&M-EST. PATIENT-LVL IV: ICD-10-PCS | Mod: PBBFAC,,,

## 2022-09-07 PROCEDURE — 99213 OFFICE O/P EST LOW 20 MIN: CPT | Mod: S$PBB,,,

## 2022-09-07 PROCEDURE — 99213 PR OFFICE/OUTPT VISIT, EST, LEVL III, 20-29 MIN: ICD-10-PCS | Mod: S$PBB,,,

## 2022-09-07 PROCEDURE — 99999 PR PBB SHADOW E&M-EST. PATIENT-LVL IV: CPT | Mod: PBBFAC,,,

## 2022-09-07 RX ORDER — SUCRALFATE 1 G/1
1 TABLET ORAL
Qty: 40 TABLET | Refills: 0 | Status: SHIPPED | OUTPATIENT
Start: 2022-09-07 | End: 2022-09-17

## 2022-09-07 NOTE — PROGRESS NOTES
Subjective:       Patient ID: Vel Banda is a 89 y.o. male Body mass index is 28.8 kg/m².    Chief Complaint: Follow-up (Dark stools)    Established patient of Dr. May and myself.     GI Problem  The primary symptoms include melena (chief complaint: intermittent black stools more often than not with BMs; currently taking oral iron supplements & uses pepto bismol seldomly; currently taking ASA 81 mg). Primary symptoms do not include fever, weight loss, fatigue, abdominal pain, nausea, vomiting, diarrhea, hematemesis, jaundice, hematochezia or dysuria.   The illness is also significant for constipation (currently having a bowel movement every 1-2 days; denies straining). The illness does not include chills, anorexia, dysphagia, odynophagia or bloating. Associated symptoms comments: EGD 08/01/22 - LA Grade B reflux esophagitis with no bleeding. Small hiatal hernia. Acute gastritis with hemorrhage. Biopsied. Non-bleeding gastric ulcers with no stigmata of bleeding. Biopsied. Normal duodenal bulb, first portion of the duodenum and second portion of the duodenum. Significant associated medical issues include PUD. Associated medical issues do not include inflammatory bowel disease, GERD, gallstones, liver disease, alcohol abuse, gastric bypass, bowel resection, irritable bowel syndrome, hemorrhoids or diverticulitis. Associated medical issues comments: Hx of anemia that has improved the past few months.   Review of Systems   Constitutional:  Negative for activity change, appetite change, chills, diaphoresis, fatigue, fever, unexpected weight change and weight loss.   HENT:  Negative for sore throat and trouble swallowing.    Respiratory:  Negative for cough, choking and shortness of breath.    Cardiovascular:  Negative for chest pain.   Gastrointestinal:  Positive for constipation (currently having a bowel movement every 1-2 days; denies straining) and melena (chief complaint: intermittent black stools more  often than not with BMs; currently taking oral iron supplements & uses pepto bismol seldomly; currently taking ASA 81 mg). Negative for abdominal distention, abdominal pain, anal bleeding, anorexia, bloating, blood in stool, diarrhea, dysphagia, hematemesis, hematochezia, jaundice, nausea, rectal pain and vomiting.   Genitourinary:  Negative for dysuria.     No LMP for male patient.  Past Medical History:   Diagnosis Date    Actinic keratosis     Benign prostatic hyperplasia     Chronic kidney disease     Stage 3     Fibromyositis     Gouty arthropathy     Hyperlipidemia     Hypertension     Osteoarthritis of knee      Past Surgical History:   Procedure Laterality Date    APPENDECTOMY      cataractsx2  2011    COLONOSCOPY      COLONOSCOPY N/A 2022    Procedure: COLONOSCOPY;  Surgeon: Edgar May MD;  Location: [facility] ENDO;  Service: Endoscopy;  Laterality: N/A;    ESOPHAGOGASTRODUODENOSCOPY N/A 2022    Procedure: EGD (ESOPHAGOGASTRODUODENOSCOPY);  Surgeon: Edgar May MD;  Location: [facility] ENDO;  Service: Endoscopy;  Laterality: N/A;    UPPER GASTROINTESTINAL ENDOSCOPY       Family History   Problem Relation Age of Onset    Hypertension Mother     Coronary artery disease Mother     Colon cancer Neg Hx     Crohn's disease Neg Hx     Ulcerative colitis Neg Hx     Stomach cancer Neg Hx     Esophageal cancer Neg Hx      Social History     Tobacco Use    Smoking status: Former     Types: Cigarettes     Quit date:      Years since quittin.7    Smokeless tobacco: Current     Types: Chew   Substance Use Topics    Alcohol use: Yes     Comment: occasionally    Drug use: Never     Wt Readings from Last 10 Encounters:   22 83.4 kg (183 lb 13.8 oz)   22 83.5 kg (184 lb)   22 81.6 kg (180 lb)   22 83.9 kg (185 lb)   22 84.7 kg (186 lb 11.7 oz)   22 86.2 kg (190 lb)   22 86.6 kg (191 lb)   21 84.8 kg (187 lb)   21 87.6 kg (193 lb 3.2 oz)    04/13/14 83.9 kg (185 lb)     Lab Results   Component Value Date    WBC 6.87 08/15/2022    HGB 14.2 08/15/2022    HCT 41.8 08/15/2022    MCV 88 08/15/2022     08/15/2022     CMP  Sodium   Date Value Ref Range Status   05/17/2022 140 136 - 145 mmol/L Final     Potassium   Date Value Ref Range Status   05/17/2022 4.5 3.5 - 5.1 mmol/L Final     Chloride   Date Value Ref Range Status   05/17/2022 109 95 - 110 mmol/L Final     CO2   Date Value Ref Range Status   05/17/2022 22 (L) 23 - 29 mmol/L Final     Glucose   Date Value Ref Range Status   05/17/2022 101 70 - 110 mg/dL Final     BUN   Date Value Ref Range Status   05/17/2022 31 (H) 10 - 30 mg/dL Final     Creatinine   Date Value Ref Range Status   05/17/2022 1.6 (H) 0.5 - 1.4 mg/dL Final     Calcium   Date Value Ref Range Status   05/17/2022 8.7 8.7 - 10.5 mg/dL Final     Total Protein   Date Value Ref Range Status   05/17/2022 6.7 6.0 - 8.4 g/dL Final     Albumin   Date Value Ref Range Status   05/17/2022 3.7 3.5 - 5.2 g/dL Final     Total Bilirubin   Date Value Ref Range Status   05/17/2022 0.3 0.1 - 1.0 mg/dL Final     Comment:     For infants and newborns, interpretation of results should be based  on gestational age, weight and in agreement with clinical  observations.    Premature Infant recommended reference ranges:  Up to 24 hours.............<8.0 mg/dL  Up to 48 hours............<12.0 mg/dL  3-5 days..................<15.0 mg/dL  6-29 days.................<15.0 mg/dL       Alkaline Phosphatase   Date Value Ref Range Status   05/17/2022 80 55 - 135 U/L Final     AST   Date Value Ref Range Status   05/17/2022 16 10 - 40 U/L Final     ALT   Date Value Ref Range Status   05/17/2022 14 10 - 44 U/L Final     Anion Gap   Date Value Ref Range Status   05/17/2022 9 8 - 16 mmol/L Final     eGFR if    Date Value Ref Range Status   05/17/2022 42 (A) >60 mL/min/1.73 m^2 Final     eGFR if non    Date Value Ref Range Status    05/17/2022 36 (A) >60 mL/min/1.73 m^2 Final     Comment:     Calculation used to obtain the estimated glomerular filtration  rate (eGFR) is the CKD-EPI equation.        Lab Results   Component Value Date    IRON 218 (H) 06/16/2022    TIBC 379 06/16/2022    FERRITIN 8 (L) 12/09/2021       Reviewed prior medical records including visit note with Dr. Medrano 06/16/22 & endoscopy history (see surgical history).    Objective:      Physical Exam  Vitals and nursing note reviewed.   Constitutional:       General: He is not in acute distress.     Appearance: Normal appearance. He is normal weight. He is not ill-appearing.   HENT:      Mouth/Throat:      Lips: Pink.      Mouth: Mucous membranes are moist.   Eyes:      Extraocular Movements: Extraocular movements intact.      Pupils: Pupils are equal, round, and reactive to light.   Cardiovascular:      Rate and Rhythm: Normal rate and regular rhythm.      Heart sounds: Normal heart sounds.   Pulmonary:      Effort: Pulmonary effort is normal. No respiratory distress.      Breath sounds: Normal breath sounds.   Abdominal:      General: Abdomen is flat. Bowel sounds are normal. There is no distension or abdominal bruit. There are no signs of injury.      Palpations: Abdomen is soft. There is no shifting dullness, fluid wave, hepatomegaly, splenomegaly or mass.      Tenderness: There is no abdominal tenderness. There is no guarding or rebound. Negative signs include Nguyễn's sign, Rovsing's sign and McBurney's sign.      Hernia: No hernia is present.   Skin:     General: Skin is warm and dry.      Coloration: Skin is not jaundiced or pale.      Findings: Bruising present.   Neurological:      Mental Status: He is alert and oriented to person, place, and time.   Psychiatric:         Attention and Perception: Attention normal.         Mood and Affect: Mood normal.         Speech: Speech normal.         Behavior: Behavior normal.       Assessment:       1. Melena    2. PUD (peptic  ulcer disease)    3. Gastric ulcer, unspecified chronicity, unspecified whether gastric ulcer hemorrhage or perforation present    4. Esophagitis determined by endoscopy    5. Hiatal hernia    6. History of gastritis    7. History of constipation    8. History of anemia          Plan:       Melena  - schedule EGD, discussed procedure with patient, including risks and benefits, patient verbalized understanding    PUD (peptic ulcer disease)  - schedule EGD, discussed procedure with patient, including risks and benefits, patient verbalized understanding  -Continue protonix 40 mg once daily 30 minutes before breakfast  - START: sucralfate (CARAFATE) 1 gram tablet; Take 1 tablet (1 g total) by mouth 4 (four) times daily before meals and nightly. for 10 days  Dispense: 40 tablet; Refill: 0    Gastric ulcer, unspecified chronicity, unspecified whether gastric ulcer hemorrhage or perforation present  - schedule EGD, discussed procedure with patient, including risks and benefits, patient verbalized understanding  -Continue protonix 40 mg once daily 30 minutes before breakfast    Esophagitis determined by endoscopy  - schedule EGD, discussed procedure with patient, including risks and benefits, patient verbalized understanding  -Continue protonix 40 mg once daily 30 minutes before breakfast    Hiatal hernia  - schedule EGD, discussed procedure with patient, including risks and benefits, patient verbalized understanding  - usually managed by controlling reflux symptoms, surgery is an option, but usually performed if reflux is uncontrolled by medication management and lifestyle/dietary modifications; if symptoms persist despite medication management and lifestyle/dietary modifications, we can refer to general surgery to consult about surgical options, patient verbalized understanding  -Continue protonix 40 mg once daily 30 minutes before breakfast    History of gastritis  - schedule EGD, discussed procedure with patient,  including risks and benefits, patient verbalized understanding  -Continue protonix 40 mg once daily 30 minutes before breakfast    History of constipation  -Recommend daily exercise as tolerated, adequate water intake (six 8-oz glasses of water daily), and high fiber diet. OTC fiber supplements are recommended if diet does not reach daily fiber goal (20-30 grams daily), such as Metamucil, Citrucel, or FiberCon (take as directed, separate from other oral medications by >2 hours).  -Recommend taking an OTC stool softener such as Colace as directed to avoid hard stools and straining with bowel movements PRN  -Recommend trying OTC MiraLax once daily (17g PO) as directed  - If no improvement with above recommendations, try intermittently dosed Dulcolax OTC as directed (every 3-4  days) PRN to facilitate bowel movements  -If still no improvement with these measures, call/follow-up    History of anemia  -follow-up with PCP and/or hematology for continued evaluation and management  -Continue oral iron as prescribed    Follow up in about 4 weeks (around 10/5/2022), or if symptoms worsen or fail to improve.      If no improvement in symptoms or symptoms worsen, call/follow-up at clinic or go to ER.        30 minutes of total time spent on the encounter, which includes face to face time and non-face to face time preparing to see the patient (eg, review of tests), Obtaining and/or reviewing separately obtained history, Documenting clinical information in the electronic or other health record, Independently interpreting results (not separately reported) and communicating results to the patient/family/caregiver, or Care coordination (not separately reported).

## 2022-09-12 ENCOUNTER — ANESTHESIA EVENT (OUTPATIENT)
Dept: ENDOSCOPY | Facility: HOSPITAL | Age: 87
End: 2022-09-12
Payer: MEDICARE

## 2022-09-12 ENCOUNTER — ANESTHESIA (OUTPATIENT)
Dept: ENDOSCOPY | Facility: HOSPITAL | Age: 87
End: 2022-09-12
Payer: MEDICARE

## 2022-09-12 ENCOUNTER — HOSPITAL ENCOUNTER (OUTPATIENT)
Facility: HOSPITAL | Age: 87
Discharge: HOME | End: 2022-09-12
Attending: INTERNAL MEDICINE | Admitting: INTERNAL MEDICINE
Payer: MEDICARE

## 2022-09-12 VITALS
RESPIRATION RATE: 14 BRPM | TEMPERATURE: 98 F | SYSTOLIC BLOOD PRESSURE: 130 MMHG | OXYGEN SATURATION: 98 % | BODY MASS INDEX: 28.72 KG/M2 | HEART RATE: 57 BPM | HEIGHT: 67 IN | DIASTOLIC BLOOD PRESSURE: 65 MMHG | WEIGHT: 183 LBS

## 2022-09-12 DIAGNOSIS — D50.9 IDA (IRON DEFICIENCY ANEMIA): ICD-10-CM

## 2022-09-12 DIAGNOSIS — K20.90 ESOPHAGITIS: Primary | ICD-10-CM

## 2022-09-12 PROCEDURE — 43239 EGD BIOPSY SINGLE/MULTIPLE: CPT | Performed by: INTERNAL MEDICINE

## 2022-09-12 PROCEDURE — 43239 PR EGD, FLEX, W/BIOPSY, SGL/MULTI: ICD-10-PCS | Mod: ,,, | Performed by: INTERNAL MEDICINE

## 2022-09-12 PROCEDURE — 37000008 HC ANESTHESIA 1ST 15 MINUTES: Performed by: INTERNAL MEDICINE

## 2022-09-12 PROCEDURE — 25000003 PHARM REV CODE 250: Performed by: INTERNAL MEDICINE

## 2022-09-12 PROCEDURE — D9220A PRA ANESTHESIA: Mod: CRNA,,, | Performed by: NURSE ANESTHETIST, CERTIFIED REGISTERED

## 2022-09-12 PROCEDURE — 37000009 HC ANESTHESIA EA ADD 15 MINS: Performed by: INTERNAL MEDICINE

## 2022-09-12 PROCEDURE — D9220A PRA ANESTHESIA: Mod: ANES,,, | Performed by: ANESTHESIOLOGY

## 2022-09-12 PROCEDURE — D9220A PRA ANESTHESIA: ICD-10-PCS | Mod: CRNA,,, | Performed by: NURSE ANESTHETIST, CERTIFIED REGISTERED

## 2022-09-12 PROCEDURE — 88305 TISSUE EXAM BY PATHOLOGIST: CPT | Mod: 26,,, | Performed by: STUDENT IN AN ORGANIZED HEALTH CARE EDUCATION/TRAINING PROGRAM

## 2022-09-12 PROCEDURE — D9220A PRA ANESTHESIA: ICD-10-PCS | Mod: ANES,,, | Performed by: ANESTHESIOLOGY

## 2022-09-12 PROCEDURE — 27201012 HC FORCEPS, HOT/COLD, DISP: Performed by: INTERNAL MEDICINE

## 2022-09-12 PROCEDURE — 43239 EGD BIOPSY SINGLE/MULTIPLE: CPT | Mod: ,,, | Performed by: INTERNAL MEDICINE

## 2022-09-12 PROCEDURE — 88305 TISSUE EXAM BY PATHOLOGIST: CPT | Mod: 59 | Performed by: STUDENT IN AN ORGANIZED HEALTH CARE EDUCATION/TRAINING PROGRAM

## 2022-09-12 PROCEDURE — 25000003 PHARM REV CODE 250: Performed by: NURSE ANESTHETIST, CERTIFIED REGISTERED

## 2022-09-12 PROCEDURE — 88305 TISSUE EXAM BY PATHOLOGIST: ICD-10-PCS | Mod: 26,,, | Performed by: STUDENT IN AN ORGANIZED HEALTH CARE EDUCATION/TRAINING PROGRAM

## 2022-09-12 PROCEDURE — 63600175 PHARM REV CODE 636 W HCPCS: Performed by: NURSE ANESTHETIST, CERTIFIED REGISTERED

## 2022-09-12 RX ORDER — PHENYLEPHRINE HYDROCHLORIDE 10 MG/ML
INJECTION INTRAVENOUS
Status: DISCONTINUED | OUTPATIENT
Start: 2022-09-12 | End: 2022-09-12

## 2022-09-12 RX ORDER — LIDOCAINE HCL/PF 100 MG/5ML
SYRINGE (ML) INTRAVENOUS
Status: DISCONTINUED | OUTPATIENT
Start: 2022-09-12 | End: 2022-09-12

## 2022-09-12 RX ORDER — PROPOFOL 10 MG/ML
VIAL (ML) INTRAVENOUS
Status: DISCONTINUED | OUTPATIENT
Start: 2022-09-12 | End: 2022-09-12

## 2022-09-12 RX ORDER — SODIUM CHLORIDE 9 MG/ML
INJECTION, SOLUTION INTRAVENOUS CONTINUOUS
Status: DISCONTINUED | OUTPATIENT
Start: 2022-09-12 | End: 2022-09-12 | Stop reason: HOSPADM

## 2022-09-12 RX ADMIN — PROPOFOL 20 MG: 10 INJECTION, EMULSION INTRAVENOUS at 11:09

## 2022-09-12 RX ADMIN — PROPOFOL 60 MG: 10 INJECTION, EMULSION INTRAVENOUS at 11:09

## 2022-09-12 RX ADMIN — PHENYLEPHRINE HYDROCHLORIDE 100 MCG: 10 INJECTION INTRAVENOUS at 11:09

## 2022-09-12 RX ADMIN — PHENYLEPHRINE HYDROCHLORIDE 200 MCG: 10 INJECTION INTRAVENOUS at 11:09

## 2022-09-12 RX ADMIN — LIDOCAINE HYDROCHLORIDE 100 MG: 20 INJECTION INTRAVENOUS at 11:09

## 2022-09-12 RX ADMIN — SODIUM CHLORIDE: 0.9 INJECTION, SOLUTION INTRAVENOUS at 10:09

## 2022-09-12 NOTE — TRANSFER OF CARE
"Anesthesia Transfer of Care Note    Patient: Vel Banda    Procedure(s) Performed: Procedure(s) (LRB):  EGD (ESOPHAGOGASTRODUODENOSCOPY) (N/A)    Patient location: PACU    Anesthesia Type: general    Transport from OR: Transported from OR on 2-3 L/min O2 by NC with adequate spontaneous ventilation    Post pain: adequate analgesia    Post assessment: no apparent anesthetic complications and tolerated procedure well    Post vital signs: stable    Level of consciousness: awake, alert and oriented    Nausea/Vomiting: no nausea/vomiting    Complications: none    Transfer of care protocol was followed      Last vitals:   Visit Vitals  BP (!) 159/68 (BP Location: Left arm, Patient Position: Lying)   Pulse 60   Temp 36.7 °C (98.1 °F) (Skin)   Resp 16   Ht 5' 7" (1.702 m)   Wt 83 kg (183 lb)   SpO2 98%   BMI 28.66 kg/m²     "

## 2022-09-12 NOTE — H&P
History & Physical - Short Stay  Gastroenterology      SUBJECTIVE:     Procedure: EGD    Chief Complaint/Indication for Procedure: History of esophagitis and h.pylori    PTA Medications   Medication Sig    allopurinoL (ZYLOPRIM) 300 MG tablet Take 1 tablet (300 mg total) by mouth once daily.    aspirin 81 MG Chew Take 81 mg by mouth once daily.    cholestyramine, with sugar, 4 gram Powd Take 1 Scoop by mouth once daily.    ferrous sulfate 325 (65 FE) MG EC tablet Take 1 tablet (325 mg total) by mouth 2 (two) times daily. (Patient taking differently: Take 325 mg by mouth 2 (two) times daily. Taking 1 qd)    hydroCHLOROthiazide (MICROZIDE) 12.5 mg capsule Take 1 capsule (12.5 mg total) by mouth once daily.    lisinopriL (PRINIVIL,ZESTRIL) 20 MG tablet Take 1 tablet (20 mg total) by mouth once daily.    pantoprazole (PROTONIX) 40 MG tablet Take 1 tablet (40 mg total) by mouth once daily.    sucralfate (CARAFATE) 1 gram tablet Take 1 tablet (1 g total) by mouth 4 (four) times daily before meals and nightly. for 10 days    tamsulosin (FLOMAX) 0.4 mg Cap Take 1 capsule (0.4 mg total) by mouth once daily.    travoprost, benzalkonium, (TRAVATAN) 0.004 % ophthalmic solution 1 drop every evening.       Review of patient's allergies indicates:   Allergen Reactions    Pcn [penicillins]         Past Medical History:   Diagnosis Date    Actinic keratosis     Benign prostatic hyperplasia     Chronic kidney disease     Stage 3     Fibromyositis     Gouty arthropathy     Hyperlipidemia     Hypertension     Osteoarthritis of knee      Past Surgical History:   Procedure Laterality Date    APPENDECTOMY      cataractsx2  02/21/2011    COLONOSCOPY      COLONOSCOPY N/A 08/01/2022    Procedure: COLONOSCOPY;  Surgeon: Edgar May MD;  Location: [facility];  Service: Endoscopy;  Laterality: N/A;    ESOPHAGOGASTRODUODENOSCOPY N/A 08/01/2022    Procedure: EGD (ESOPHAGOGASTRODUODENOSCOPY);  Surgeon: Edgar May MD;  Location: [facility]  ENDO;  Service: Endoscopy;  Laterality: N/A;    UPPER GASTROINTESTINAL ENDOSCOPY       Family History   Problem Relation Age of Onset    Hypertension Mother     Coronary artery disease Mother     Colon cancer Neg Hx     Crohn's disease Neg Hx     Ulcerative colitis Neg Hx     Stomach cancer Neg Hx     Esophageal cancer Neg Hx      Social History     Tobacco Use    Smoking status: Former     Types: Cigarettes     Quit date: 1975     Years since quittin.7    Smokeless tobacco: Current     Types: Chew   Substance Use Topics    Alcohol use: Yes     Comment: occasionally    Drug use: Never         OBJECTIVE:     Vital Signs (Most Recent)  Temp: 98.1 °F (36.7 °C) (22 1008)  Pulse: 60 (22 1008)  Resp: 16 (22 1008)  BP: (!) 159/68 (22 1008)  SpO2: 98 % (22 1008)    Physical Exam:                                                       GENERAL:  Comfortable, in no acute distress.                                 HEENT EXAM:  Nonicteric.  No adenopathy.  Oropharynx is clear.               NECK:  Supple.                                                               LUNGS:  Clear.                                                               CARDIAC:  Regular rate and rhythm.  S1, S2.  No murmur.                      ABDOMEN:  Soft, positive bowel sounds, nontender.  No hepatosplenomegaly or masses.  No rebound or guarding.                                             EXTREMITIES:  No edema.     MENTAL STATUS:  Normal, alert and oriented.    ASSESSMENT/PLAN:     Assessment: History of esophagitis and h.pylori    Plan: EGD

## 2022-09-12 NOTE — ANESTHESIA POSTPROCEDURE EVALUATION
Anesthesia Post Evaluation    Patient: Vel Banda    Procedure(s) Performed: Procedure(s) (LRB):  EGD (ESOPHAGOGASTRODUODENOSCOPY) (N/A)    Final Anesthesia Type: general      Patient location during evaluation: PACU  Patient participation: Yes- Able to Participate  Level of consciousness: awake and alert  Post-procedure vital signs: reviewed and stable  Pain management: adequate  Airway patency: patent    PONV status at discharge: No PONV  Anesthetic complications: no      Cardiovascular status: hemodynamically stable  Respiratory status: unassisted and room air  Hydration status: euvolemic  Follow-up not needed.          Vitals Value Taken Time   /65 09/12/22 1135     09/12/22 1201   Pulse 57 09/12/22 1135   Resp 14 09/12/22 1135   SpO2 98 % 09/12/22 1135         Event Time   Out of Recovery 11:53:24         Pain/William Score: William Score: 10 (9/12/2022 11:40 AM)

## 2022-09-12 NOTE — ANESTHESIA PREPROCEDURE EVALUATION
09/12/2022  Vel Banda is a 89 y.o., male.      Pre-op Assessment    I have reviewed the Patient Summary Reports.     I have reviewed the Nursing Notes. I have reviewed the NPO Status.   I have reviewed the Medications.     Review of Systems  Anesthesia Hx:  No problems with previous Anesthesia Denies Hx of Anesthetic complications    Social:  Former Smoker, Alcohol Use    Hematology/Oncology:         -- Anemia:   Cardiovascular:   Hypertension Denies MI.  Denies CAD.    Denies CABG/stent.   Denies Angina. hyperlipidemia    Pulmonary:  Pulmonary Normal    Renal/:   Chronic Renal Disease, CRI Stage 3 chronic kidney disease   Hepatic/GI:   Denies GERD. Denies Liver Disease.    Musculoskeletal:   Arthritis     Neurological:   Denies TIA. Denies CVA. Neuromuscular Disease,  Denies Seizures.    Endocrine:  Endocrine Normal    Psych:  Psychiatric Normal           Physical Exam  General: Well nourished, Cooperative, Oriented and Alert    Airway:  Mallampati: II / II  Mouth Opening: Normal  TM Distance: 4 - 6 cm  Tongue: Normal    Dental:  Dentures    Chest/Lungs:  Clear to auscultation, Normal Respiratory Rate    Heart:  Rate: Normal  Rhythm: Regular Rhythm  Sounds: Normal        Anesthesia Plan  Type of Anesthesia, risks & benefits discussed:    Anesthesia Type: Gen Natural Airway  Intra-op Monitoring Plan: Standard ASA Monitors  Induction:  IV  Informed Consent: Informed consent signed with the Patient and all parties understand the risks and agree with anesthesia plan.  All questions answered.   ASA Score: 3  Day of Surgery Review of History & Physical: H&P Update referred to the surgeon/provider.    Ready For Surgery From Anesthesia Perspective.     .

## 2022-09-16 LAB
FINAL PATHOLOGIC DIAGNOSIS: ABNORMAL
GROSS: ABNORMAL
Lab: ABNORMAL
MICROSCOPIC EXAM: ABNORMAL

## 2022-09-28 ENCOUNTER — TELEPHONE (OUTPATIENT)
Dept: GASTROENTEROLOGY | Facility: CLINIC | Age: 87
End: 2022-09-28
Payer: MEDICARE

## 2022-09-28 RX ORDER — SUCRALFATE 1 G/1
1 TABLET ORAL 4 TIMES DAILY
COMMUNITY
Start: 2022-09-24 | End: 2023-01-03

## 2022-09-28 NOTE — TELEPHONE ENCOUNTER
----- Message from Edgar May MD sent at 9/27/2022 10:10 PM CDT -----  Needs f/u with Vielka for recurrent h.pylori. He may not have taken his prior antibiotics that were called in. Needs visit to discuss.

## 2022-09-28 NOTE — TELEPHONE ENCOUNTER
Call placed to Mr. Eucedaoy in regards to making a f/u visit per Dr. May. Offered her 10/10 at 1030. He accepted. No further issues noted.

## 2022-09-29 ENCOUNTER — OFFICE VISIT (OUTPATIENT)
Dept: FAMILY MEDICINE | Facility: CLINIC | Age: 87
End: 2022-09-29
Payer: MEDICARE

## 2022-09-29 ENCOUNTER — LAB VISIT (OUTPATIENT)
Dept: LAB | Facility: CLINIC | Age: 87
End: 2022-09-29
Payer: MEDICARE

## 2022-09-29 VITALS
DIASTOLIC BLOOD PRESSURE: 70 MMHG | HEART RATE: 72 BPM | SYSTOLIC BLOOD PRESSURE: 120 MMHG | HEIGHT: 67 IN | OXYGEN SATURATION: 98 % | TEMPERATURE: 98 F | BODY MASS INDEX: 29.03 KG/M2 | WEIGHT: 185 LBS

## 2022-09-29 DIAGNOSIS — N18.32 STAGE 3B CHRONIC KIDNEY DISEASE: ICD-10-CM

## 2022-09-29 DIAGNOSIS — M1A.00X0 CHRONIC GOUTY ARTHROPATHY: ICD-10-CM

## 2022-09-29 DIAGNOSIS — N40.0 BENIGN PROSTATIC HYPERPLASIA, UNSPECIFIED WHETHER LOWER URINARY TRACT SYMPTOMS PRESENT: ICD-10-CM

## 2022-09-29 DIAGNOSIS — E78.2 MIXED HYPERLIPIDEMIA: ICD-10-CM

## 2022-09-29 DIAGNOSIS — D50.0 IRON DEFICIENCY ANEMIA DUE TO CHRONIC BLOOD LOSS: ICD-10-CM

## 2022-09-29 DIAGNOSIS — D50.0 IRON DEFICIENCY ANEMIA DUE TO CHRONIC BLOOD LOSS: Primary | ICD-10-CM

## 2022-09-29 DIAGNOSIS — I10 ESSENTIAL HYPERTENSION: ICD-10-CM

## 2022-09-29 LAB
ALBUMIN SERPL BCP-MCNC: 3.9 G/DL (ref 3.5–5.2)
ALP SERPL-CCNC: 86 U/L (ref 55–135)
ALT SERPL W/O P-5'-P-CCNC: 18 U/L (ref 10–44)
ANION GAP SERPL CALC-SCNC: 9 MMOL/L (ref 8–16)
AST SERPL-CCNC: 22 U/L (ref 10–40)
BASOPHILS # BLD AUTO: 0.07 K/UL (ref 0–0.2)
BASOPHILS NFR BLD: 1.1 % (ref 0–1.9)
BILIRUB SERPL-MCNC: 0.7 MG/DL (ref 0.1–1)
BUN SERPL-MCNC: 24 MG/DL (ref 10–30)
CALCIUM SERPL-MCNC: 9.4 MG/DL (ref 8.7–10.5)
CHLORIDE SERPL-SCNC: 107 MMOL/L (ref 95–110)
CO2 SERPL-SCNC: 24 MMOL/L (ref 23–29)
CREAT SERPL-MCNC: 1.4 MG/DL (ref 0.5–1.4)
DIFFERENTIAL METHOD: ABNORMAL
EOSINOPHIL # BLD AUTO: 0.3 K/UL (ref 0–0.5)
EOSINOPHIL NFR BLD: 4.1 % (ref 0–8)
ERYTHROCYTE [DISTWIDTH] IN BLOOD BY AUTOMATED COUNT: 16.7 % (ref 11.5–14.5)
EST. GFR  (NO RACE VARIABLE): 46 ML/MIN/1.73 M^2
GLUCOSE SERPL-MCNC: 102 MG/DL (ref 70–110)
HCT VFR BLD AUTO: 42.9 % (ref 40–54)
HGB BLD-MCNC: 14.7 G/DL (ref 14–18)
IMM GRANULOCYTES # BLD AUTO: 0.02 K/UL (ref 0–0.04)
IMM GRANULOCYTES NFR BLD AUTO: 0.3 % (ref 0–0.5)
IRON SERPL-MCNC: 115 UG/DL (ref 45–160)
LYMPHOCYTES # BLD AUTO: 1.3 K/UL (ref 1–4.8)
LYMPHOCYTES NFR BLD: 20.3 % (ref 18–48)
MCH RBC QN AUTO: 32.2 PG (ref 27–31)
MCHC RBC AUTO-ENTMCNC: 34.3 G/DL (ref 32–36)
MCV RBC AUTO: 94 FL (ref 82–98)
MONOCYTES # BLD AUTO: 0.6 K/UL (ref 0.3–1)
MONOCYTES NFR BLD: 9.3 % (ref 4–15)
NEUTROPHILS # BLD AUTO: 4.1 K/UL (ref 1.8–7.7)
NEUTROPHILS NFR BLD: 64.9 % (ref 38–73)
NRBC BLD-RTO: 0 /100 WBC
PLATELET # BLD AUTO: 186 K/UL (ref 150–450)
PMV BLD AUTO: 10.5 FL (ref 9.2–12.9)
POTASSIUM SERPL-SCNC: 4.9 MMOL/L (ref 3.5–5.1)
PROT SERPL-MCNC: 6.7 G/DL (ref 6–8.4)
RBC # BLD AUTO: 4.57 M/UL (ref 4.6–6.2)
SATURATED IRON: 37 % (ref 20–50)
SODIUM SERPL-SCNC: 140 MMOL/L (ref 136–145)
TOTAL IRON BINDING CAPACITY: 314 UG/DL (ref 250–450)
TRANSFERRIN SERPL-MCNC: 212 MG/DL (ref 200–375)
WBC # BLD AUTO: 6.27 K/UL (ref 3.9–12.7)

## 2022-09-29 PROCEDURE — 85025 COMPLETE CBC W/AUTO DIFF WBC: CPT | Performed by: FAMILY MEDICINE

## 2022-09-29 PROCEDURE — 99999 PR PBB SHADOW E&M-EST. PATIENT-LVL III: ICD-10-PCS | Mod: PBBFAC,,, | Performed by: FAMILY MEDICINE

## 2022-09-29 PROCEDURE — 99214 OFFICE O/P EST MOD 30 MIN: CPT | Mod: S$PBB,,, | Performed by: FAMILY MEDICINE

## 2022-09-29 PROCEDURE — 99213 OFFICE O/P EST LOW 20 MIN: CPT | Mod: PBBFAC,PN | Performed by: FAMILY MEDICINE

## 2022-09-29 PROCEDURE — 99214 PR OFFICE/OUTPT VISIT, EST, LEVL IV, 30-39 MIN: ICD-10-PCS | Mod: S$PBB,,, | Performed by: FAMILY MEDICINE

## 2022-09-29 PROCEDURE — 80053 COMPREHEN METABOLIC PANEL: CPT | Performed by: FAMILY MEDICINE

## 2022-09-29 PROCEDURE — 99999 PR PBB SHADOW E&M-EST. PATIENT-LVL III: CPT | Mod: PBBFAC,,, | Performed by: FAMILY MEDICINE

## 2022-09-29 PROCEDURE — 84466 ASSAY OF TRANSFERRIN: CPT | Performed by: FAMILY MEDICINE

## 2022-09-29 RX ORDER — CHOLESTYRAMINE 4 G/9G
1 POWDER, FOR SUSPENSION ORAL DAILY
Qty: 378 G | Refills: 11 | Status: SHIPPED | OUTPATIENT
Start: 2022-12-09 | End: 2023-01-03 | Stop reason: SDUPTHER

## 2022-09-29 RX ORDER — HYDROCHLOROTHIAZIDE 12.5 MG/1
12.5 CAPSULE ORAL DAILY
Qty: 90 CAPSULE | Refills: 3 | Status: SHIPPED | OUTPATIENT
Start: 2022-12-09 | End: 2023-01-03

## 2022-09-29 RX ORDER — TAMSULOSIN HYDROCHLORIDE 0.4 MG/1
0.4 CAPSULE ORAL DAILY
Qty: 90 CAPSULE | Refills: 3 | Status: SHIPPED | OUTPATIENT
Start: 2022-12-09 | End: 2023-01-03 | Stop reason: SDUPTHER

## 2022-09-29 RX ORDER — ALLOPURINOL 300 MG/1
300 TABLET ORAL DAILY
Qty: 90 TABLET | Refills: 3 | Status: SHIPPED | OUTPATIENT
Start: 2022-12-09 | End: 2023-01-03 | Stop reason: SDUPTHER

## 2022-09-29 RX ORDER — LISINOPRIL 20 MG/1
20 TABLET ORAL DAILY
Qty: 90 TABLET | Refills: 3 | Status: SHIPPED | OUTPATIENT
Start: 2022-12-09 | End: 2023-01-03 | Stop reason: SDUPTHER

## 2022-09-29 NOTE — PROGRESS NOTES
SUBJECTIVE:    Patient ID: Vel Banda is a 89 y.o. male.    Chief Complaint: Follow-up (Pt here for f/u GI referral/Pt seen Dr Ying,had EGD on 9-12 and colonoscopy on 8-1-22)    Follow-up on this 89-year-old male.    He had a Helicobacter pylori ulcer bleed.    Repeat gastro still shows presence of Helicobacter however no bleeding.    He has had no more blood in his stool.    Questionable if he was able to comply with antibiotic regime given by GI  Given sucralfate by GI.    States he is taking it 2 times a day.        He does complain of bilateral lower leg weakness when walking.    He does not describe classic vascular claudication with cramps, just weakness          Admission on 09/12/2022, Discharged on 09/12/2022   Component Date Value Ref Range Status    Final Pathologic Diagnosis 09/12/2022  (A)   Final                    Value:1. Stomach, biopsy:  - Helicobacter pylori-associated chronic active gastritis  - Intestinal metaplasia present, negative for dysplasia  2. Gastroesophageal junction, biopsy:  - Chronic active carditis, compatible with reflux carditis  - Chronic esophagitis, compatible with reflux esophagitis  - No significantly increased eosinophils  - Negative for intestinal metaplasia or dysplasia      Microscopic Exam 09/12/2022  (A)   Final                    Value:1. Histologic sections show gastric mucosa with a dense lymphoplasmacytic  inflammatory infiltrate admixed with prominent active inflammation.  Several  biopsy pieces are involved by intestinal metaplasia without evidence of  dysplasia or carcinoma. Numerous bacterial organisms consistent with  Helicobacter pylori are identified overlying the surface epithelium and  within gastric pits on H&E sections.  The overall findings are in keeping  with the above diagnosis.  2. Microscopic examination is performed and the results are incorporated into  the above findings.      Gross 09/12/2022  (A)   Final                     "Value:Number of containers:  2  Part 1  Container Label: Clinic Number/AP Number:  [id number], and "gastric Bx"  Received in formalin are 5 soft, tan tissue fragments ranging from 2-5 mm in  greatest dimension.  Specimen is stained with Hematoxylin and entirely  submitted in YIW--1-A.  Part 2  Container Label: Clinic Number/AP Number:  [id number], and "GEJ Bx"  Received in formalin is a 7 x 6 x 1 mm aggregate of soft, tan tissue  fragments.  Specimen is filtered, stained with Hematoxylin, and entirely  submitted in TIS--2-A.  CORINNE Lee.      Disclaimer 09/12/2022  (A)   Final                    Value:Unless the case is a 'gross only' or additional testing only, the final  diagnosis for each specimen is based on a microscopic examination of  appropriate tissue sections.     Lab Visit on 08/15/2022   Component Date Value Ref Range Status    WBC 08/15/2022 6.87  3.90 - 12.70 K/uL Final    RBC 08/15/2022 4.74  4.60 - 6.20 M/uL Final    Hemoglobin 08/15/2022 14.2  14.0 - 18.0 g/dL Final    Hematocrit 08/15/2022 41.8  40.0 - 54.0 % Final    MCV 08/15/2022 88  82 - 98 fL Final    MCH 08/15/2022 30.0  27.0 - 31.0 pg Final    MCHC 08/15/2022 34.0  32.0 - 36.0 g/dL Final    RDW 08/15/2022 19.4 (H)  11.5 - 14.5 % Final    Platelets 08/15/2022 198  150 - 450 K/uL Final    MPV 08/15/2022 10.5  9.2 - 12.9 fL Final    Immature Granulocytes 08/15/2022 0.1  0.0 - 0.5 % Final    Gran # (ANC) 08/15/2022 4.0  1.8 - 7.7 K/uL Final    Immature Grans (Abs) 08/15/2022 0.01  0.00 - 0.04 K/uL Final    Lymph # 08/15/2022 1.9  1.0 - 4.8 K/uL Final    Mono # 08/15/2022 0.6  0.3 - 1.0 K/uL Final    Eos # 08/15/2022 0.3  0.0 - 0.5 K/uL Final    Baso # 08/15/2022 0.07  0.00 - 0.20 K/uL Final    nRBC 08/15/2022 0  0 /100 WBC Final    Gran % 08/15/2022 57.7  38.0 - 73.0 % Final    Lymph % 08/15/2022 27.8  18.0 - 48.0 % Final    Mono % 08/15/2022 8.9  4.0 - 15.0 % Final    Eosinophil % 08/15/2022 4.5  0.0 - 8.0 % Final    Basophil % " "08/15/2022 1.0  0.0 - 1.9 % Final    Differential Method 08/15/2022 Automated   Final   Admission on 08/01/2022, Discharged on 08/01/2022   Component Date Value Ref Range Status    Final Pathologic Diagnosis 08/01/2022    Final                    Value:1. 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  Numerous Helicobacter pylori organisms are identified.  Focal intestinal metaplasia is identified.  Negative for dysplasia.      Gross 08/01/2022    Final                    Value:Container Label: Clinic Number/AP Number:  [id number], and "gastric Bx"  Received in formalin are 4 soft tan-white tissue fragments ranging from 2-5  mm in greatest dimension.  Specimen is stained with Hematoxylin and entirely  submitted in SSZ--1-A.  CORINNE Lee.      Disclaimer 08/01/2022    Final                    Value:Unless the case is a 'gross only' or additional testing only, the final  diagnosis for each specimen is based on a microscopic examination of  appropriate tissue sections.     Lab Visit on 06/16/2022   Component Date Value Ref Range Status    WBC 06/16/2022 6.58  3.90 - 12.70 K/uL Final    RBC 06/16/2022 4.32 (L)  4.60 - 6.20 M/uL Final    Hemoglobin 06/16/2022 11.0 (L)  14.0 - 18.0 g/dL Final    Hematocrit 06/16/2022 35.4 (L)  40.0 - 54.0 % Final    MCV 06/16/2022 82  82 - 98 fL Final    MCH 06/16/2022 25.5 (L)  27.0 - 31.0 pg Final    MCHC 06/16/2022 31.1 (L)  32.0 - 36.0 g/dL Final    RDW 06/16/2022 SEE COMMENT  11.5 - 14.5 % Final    Platelets 06/16/2022 225  150 - 450 K/uL Final    MPV 06/16/2022 10.5  9.2 - 12.9 fL Final    Immature Granulocytes 06/16/2022 0.5  0.0 - 0.5 % Final    Gran # (ANC) 06/16/2022 4.2  1.8 - 7.7 K/uL Final    Immature Grans (Abs) 06/16/2022 0.03  0.00 - 0.04 K/uL Final    Lymph # 06/16/2022 1.4  1.0 - 4.8 K/uL Final    Mono # 06/16/2022 0.6  0.3 - 1.0 K/uL Final    Eos # 06/16/2022 0.2  0.0 - 0.5 K/uL Final    Baso # " 06/16/2022 0.10  0.00 - 0.20 K/uL Final    nRBC 06/16/2022 0  0 /100 WBC Final    Gran % 06/16/2022 63.8  38.0 - 73.0 % Final    Lymph % 06/16/2022 21.9  18.0 - 48.0 % Final    Mono % 06/16/2022 9.0  4.0 - 15.0 % Final    Eosinophil % 06/16/2022 3.3  0.0 - 8.0 % Final    Basophil % 06/16/2022 1.5  0.0 - 1.9 % Final    Platelet Estimate 06/16/2022 Appears normal   Final    Aniso 06/16/2022 Moderate   Final    Poik 06/16/2022 Slight   Final    Poly 06/16/2022 Occasional   Final    Hypo 06/16/2022 Occasional   Final    Ovalocytes 06/16/2022 Occasional   Final    Tear Drop Cells 06/16/2022 Occasional   Final    Schistocytes 06/16/2022 Present   Final    Fragmented Cells 06/16/2022 Occasional   Final    Differential Method 06/16/2022 Automated   Final    Retic 06/16/2022 2.7 (H)  0.4 - 2.0 % Final    Iron 06/16/2022 218 (H)  45 - 160 ug/dL Final    Transferrin 06/16/2022 256  200 - 375 mg/dL Final    TIBC 06/16/2022 379  250 - 450 ug/dL Final    Saturated Iron 06/16/2022 58 (H)  20 - 50 % Final   Lab Visit on 06/09/2022   Component Date Value Ref Range Status    Fecal Immunochemical Test (iFOBT) 06/14/2022 Positive (A)  Negative Final   Lab Visit on 05/17/2022   Component Date Value Ref Range Status    WBC 05/17/2022 5.70  3.90 - 12.70 K/uL Final    RBC 05/17/2022 3.35 (L)  4.60 - 6.20 M/uL Final    Hemoglobin 05/17/2022 6.4 (L)  14.0 - 18.0 g/dL Final    Hematocrit 05/17/2022 23.3 (L)  40.0 - 54.0 % Final    MCV 05/17/2022 70 (L)  82 - 98 fL Final    MCH 05/17/2022 19.1 (L)  27.0 - 31.0 pg Final    MCHC 05/17/2022 27.5 (L)  32.0 - 36.0 g/dL Final    RDW 05/17/2022 19.7 (H)  11.5 - 14.5 % Final    Platelets 05/17/2022 262  150 - 450 K/uL Final    MPV 05/17/2022 10.3  9.2 - 12.9 fL Final    Immature Granulocytes 05/17/2022 0.2  0.0 - 0.5 % Final    Gran # (ANC) 05/17/2022 3.3  1.8 - 7.7 K/uL Final    Immature Grans (Abs) 05/17/2022 0.01  0.00 - 0.04 K/uL Final    Lymph # 05/17/2022 1.5  1.0 - 4.8 K/uL Final    Mono #  05/17/2022 0.6  0.3 - 1.0 K/uL Final    Eos # 05/17/2022 0.2  0.0 - 0.5 K/uL Final    Baso # 05/17/2022 0.05  0.00 - 0.20 K/uL Final    nRBC 05/17/2022 0  0 /100 WBC Final    Gran % 05/17/2022 58.2  38.0 - 73.0 % Final    Lymph % 05/17/2022 26.1  18.0 - 48.0 % Final    Mono % 05/17/2022 10.9  4.0 - 15.0 % Final    Eosinophil % 05/17/2022 3.7  0.0 - 8.0 % Final    Basophil % 05/17/2022 0.9  0.0 - 1.9 % Final    Platelet Estimate 05/17/2022 Appears normal   Final    Aniso 05/17/2022 Slight   Final    Poik 05/17/2022 Slight   Final    Poly 05/17/2022 Occasional   Final    Hypo 05/17/2022 Occasional   Final    Ovalocytes 05/17/2022 Occasional   Final    Tear Drop Cells 05/17/2022 Occasional   Final    Differential Method 05/17/2022 Automated   Final    Retic 05/17/2022 2.1 (H)  0.4 - 2.0 % Final    Sodium 05/17/2022 140  136 - 145 mmol/L Final    Potassium 05/17/2022 4.5  3.5 - 5.1 mmol/L Final    Chloride 05/17/2022 109  95 - 110 mmol/L Final    CO2 05/17/2022 22 (L)  23 - 29 mmol/L Final    Glucose 05/17/2022 101  70 - 110 mg/dL Final    BUN 05/17/2022 31 (H)  10 - 30 mg/dL Final    Creatinine 05/17/2022 1.6 (H)  0.5 - 1.4 mg/dL Final    Calcium 05/17/2022 8.7  8.7 - 10.5 mg/dL Final    Total Protein 05/17/2022 6.7  6.0 - 8.4 g/dL Final    Albumin 05/17/2022 3.7  3.5 - 5.2 g/dL Final    Total Bilirubin 05/17/2022 0.3  0.1 - 1.0 mg/dL Final    Alkaline Phosphatase 05/17/2022 80  55 - 135 U/L Final    AST 05/17/2022 16  10 - 40 U/L Final    ALT 05/17/2022 14  10 - 44 U/L Final    Anion Gap 05/17/2022 9  8 - 16 mmol/L Final    eGFR if  05/17/2022 42 (A)  >60 mL/min/1.73 m^2 Final    eGFR if non  05/17/2022 36 (A)  >60 mL/min/1.73 m^2 Final       Past Medical History:   Diagnosis Date    Actinic keratosis     Benign prostatic hyperplasia     Chronic kidney disease     Stage 3     Fibromyositis     Gouty arthropathy     Hyperlipidemia     Hypertension     Osteoarthritis of knee      Social  History     Socioeconomic History    Marital status:    Tobacco Use    Smoking status: Former     Types: Cigarettes     Quit date:      Years since quittin.7    Smokeless tobacco: Current     Types: Chew   Substance and Sexual Activity    Alcohol use: Yes     Comment: occasionally    Drug use: Never     Past Surgical History:   Procedure Laterality Date    APPENDECTOMY      [email or web address]  2011    COLONOSCOPY      COLONOSCOPY N/A 2022    Procedure: COLONOSCOPY;  Surgeon: Edgar May MD;  Location: [facility] ENDO;  Service: Endoscopy;  Laterality: N/A;    ESOPHAGOGASTRODUODENOSCOPY N/A 2022    Procedure: EGD (ESOPHAGOGASTRODUODENOSCOPY);  Surgeon: Edgar May MD;  Location: [facility] ENDO;  Service: Endoscopy;  Laterality: N/A;    ESOPHAGOGASTRODUODENOSCOPY N/A 2022    Procedure: EGD (ESOPHAGOGASTRODUODENOSCOPY);  Surgeon: Edgar May MD;  Location: [facility] ENDO;  Service: Endoscopy;  Laterality: N/A;    UPPER GASTROINTESTINAL ENDOSCOPY       Family History   Problem Relation Age of Onset    Hypertension Mother     Coronary artery disease Mother     Colon cancer Neg Hx     Crohn's disease Neg Hx     Ulcerative colitis Neg Hx     Stomach cancer Neg Hx     Esophageal cancer Neg Hx        Review of patient's allergies indicates:   Allergen Reactions    Pcn [penicillins]        Current Outpatient Medications:     aspirin 81 MG Chew, Take 81 mg by mouth once daily., Disp: , Rfl:     ferrous sulfate 325 (65 FE) MG EC tablet, Take 1 tablet (325 mg total) by mouth 2 (two) times daily. (Patient taking differently: Take 325 mg by mouth 2 (two) times daily. Taking 1 qd), Disp: 60 tablet, Rfl: 5    pantoprazole (PROTONIX) 40 MG tablet, Take 1 tablet (40 mg total) by mouth once daily., Disp: 30 tablet, Rfl: 3    sucralfate (CARAFATE) 1 gram tablet, Take 1 g by mouth 4 (four) times daily., Disp: , Rfl:     travoprost, benzalkonium, (TRAVATAN) 0.004 % ophthalmic solution, 1 drop every  "evening., Disp: , Rfl:     [START ON 12/9/2022] allopurinoL (ZYLOPRIM) 300 MG tablet, Take 1 tablet (300 mg total) by mouth once daily., Disp: 90 tablet, Rfl: 3    [START ON 12/9/2022] cholestyramine, with sugar, 4 gram Powd, Take 1 Scoop by mouth once daily., Disp: 378 g, Rfl: 11    [START ON 12/9/2022] hydroCHLOROthiazide (MICROZIDE) 12.5 mg capsule, Take 1 capsule (12.5 mg total) by mouth once daily., Disp: 90 capsule, Rfl: 3    [START ON 12/9/2022] lisinopriL (PRINIVIL,ZESTRIL) 20 MG tablet, Take 1 tablet (20 mg total) by mouth once daily., Disp: 90 tablet, Rfl: 3    [START ON 12/9/2022] tamsulosin (FLOMAX) 0.4 mg Cap, Take 1 capsule (0.4 mg total) by mouth once daily., Disp: 90 capsule, Rfl: 3    Review of Systems   Constitutional:               Per HPI    Legs get weak when walking- no cramps  No chest pain.             Objective:      Vitals:    09/29/22 1051   BP: 120/70   Pulse: 72   Temp: 97.9 °F (36.6 °C)   SpO2: 98%   Weight: 83.9 kg (185 lb)   Height: 5' 7" (1.702 m)     Physical Exam  Vitals and nursing note reviewed.   Eyes:      Comments: Pale conjunctiva   Neck:      Vascular: No carotid bruit.   Cardiovascular:      Rate and Rhythm: Regular rhythm.      Comments:       Decreased  dorsalis pedis and posterior tibial peripheral pulses  Pulmonary:      Effort: Pulmonary effort is normal.      Breath sounds: Normal breath sounds.   Neurological:      General: No focal deficit present.      Mental Status: He is alert and oriented to person, place, and time.   Psychiatric:         Mood and Affect: Mood normal.         Behavior: Behavior normal.         Thought Content: Thought content normal.         Assessment:       1. Iron deficiency anemia due to chronic blood loss    2. Chronic gouty arthropathy    3. Essential hypertension    4. Benign prostatic hyperplasia, unspecified whether lower urinary tract symptoms present    5. Stage 3b chronic kidney disease    6. Mixed hyperlipidemia           Plan:     "   Iron deficiency anemia due to chronic blood loss  -     CBC auto differential; Future; Expected date: 09/29/2022  -     Iron and TIBC; Future; Expected date: 09/29/2022    Chronic gouty arthropathy  -     allopurinoL (ZYLOPRIM) 300 MG tablet; Take 1 tablet (300 mg total) by mouth once daily.  Dispense: 90 tablet; Refill: 3    Essential hypertension  -     hydroCHLOROthiazide (MICROZIDE) 12.5 mg capsule; Take 1 capsule (12.5 mg total) by mouth once daily.  Dispense: 90 capsule; Refill: 3  -     lisinopriL (PRINIVIL,ZESTRIL) 20 MG tablet; Take 1 tablet (20 mg total) by mouth once daily.  Dispense: 90 tablet; Refill: 3    Benign prostatic hyperplasia, unspecified whether lower urinary tract symptoms present  -     tamsulosin (FLOMAX) 0.4 mg Cap; Take 1 capsule (0.4 mg total) by mouth once daily.  Dispense: 90 capsule; Refill: 3    Stage 3b chronic kidney disease  -     Comprehensive Metabolic Panel; Future; Expected date: 09/29/2022    Mixed hyperlipidemia  -     cholestyramine, with sugar, 4 gram Powd; Take 1 Scoop by mouth once daily.  Dispense: 378 g; Refill: 11    Follow up in about 6 weeks (around 11/10/2022).      Continue iron pill 1 a day  9/29/2022 Devon Medrano M.D.

## 2022-10-10 ENCOUNTER — OFFICE VISIT (OUTPATIENT)
Dept: GASTROENTEROLOGY | Facility: CLINIC | Age: 87
End: 2022-10-10
Payer: MEDICARE

## 2022-10-10 VITALS
WEIGHT: 183.88 LBS | HEIGHT: 67 IN | SYSTOLIC BLOOD PRESSURE: 152 MMHG | BODY MASS INDEX: 28.86 KG/M2 | DIASTOLIC BLOOD PRESSURE: 70 MMHG | HEART RATE: 64 BPM

## 2022-10-10 DIAGNOSIS — K44.9 HIATAL HERNIA: ICD-10-CM

## 2022-10-10 DIAGNOSIS — A04.8 H. PYLORI INFECTION: ICD-10-CM

## 2022-10-10 DIAGNOSIS — K21.9 GASTROESOPHAGEAL REFLUX DISEASE, UNSPECIFIED WHETHER ESOPHAGITIS PRESENT: ICD-10-CM

## 2022-10-10 DIAGNOSIS — Z09 FOLLOW-UP EXAM: Primary | ICD-10-CM

## 2022-10-10 DIAGNOSIS — Z87.19 HISTORY OF ESOPHAGITIS: ICD-10-CM

## 2022-10-10 DIAGNOSIS — K59.00 CONSTIPATION, UNSPECIFIED CONSTIPATION TYPE: ICD-10-CM

## 2022-10-10 PROCEDURE — 99213 PR OFFICE/OUTPT VISIT, EST, LEVL III, 20-29 MIN: ICD-10-PCS | Mod: S$PBB,,,

## 2022-10-10 PROCEDURE — 99999 PR PBB SHADOW E&M-EST. PATIENT-LVL IV: ICD-10-PCS | Mod: PBBFAC,,,

## 2022-10-10 PROCEDURE — 99999 PR PBB SHADOW E&M-EST. PATIENT-LVL IV: CPT | Mod: PBBFAC,,,

## 2022-10-10 PROCEDURE — 99214 OFFICE O/P EST MOD 30 MIN: CPT | Mod: PBBFAC,PN

## 2022-10-10 PROCEDURE — 99213 OFFICE O/P EST LOW 20 MIN: CPT | Mod: S$PBB,,,

## 2022-10-10 RX ORDER — PANTOPRAZOLE SODIUM 40 MG/1
TABLET, DELAYED RELEASE ORAL
Qty: 30 TABLET | Refills: 3 | Status: SHIPPED | OUTPATIENT
Start: 2022-10-10 | End: 2023-01-03

## 2022-10-10 RX ORDER — TETRACYCLINE HYDROCHLORIDE 500 MG/1
500 CAPSULE ORAL EVERY 6 HOURS
Qty: 56 CAPSULE | Refills: 0 | Status: SHIPPED | OUTPATIENT
Start: 2022-10-10 | End: 2022-10-24

## 2022-10-10 RX ORDER — METRONIDAZOLE 250 MG/1
250 TABLET ORAL EVERY 6 HOURS
Qty: 40 TABLET | Refills: 0 | Status: SHIPPED | OUTPATIENT
Start: 2022-10-10 | End: 2022-10-10

## 2022-10-10 RX ORDER — METRONIDAZOLE 250 MG/1
500 TABLET ORAL EVERY 8 HOURS
Qty: 84 TABLET | Refills: 0 | Status: SHIPPED | OUTPATIENT
Start: 2022-10-10 | End: 2022-10-24

## 2022-10-10 RX ORDER — TETRACYCLINE HYDROCHLORIDE 500 MG/1
500 CAPSULE ORAL EVERY 6 HOURS
Qty: 40 CAPSULE | Refills: 0 | Status: SHIPPED | OUTPATIENT
Start: 2022-10-10 | End: 2022-10-10

## 2022-10-10 NOTE — PROGRESS NOTES
"Subjective:       Patient ID: Vel Banda is a 89 y.o. male Body mass index is 28.8 kg/m².    Chief Complaint: Follow-up    Established patient of Dr. May and myself.     GI Problem  The primary symptoms include melena (resolved; denies black stools for "at least a couple of weeks"). Primary symptoms do not include fever, weight loss, fatigue, abdominal pain, nausea, vomiting, diarrhea, hematemesis, jaundice, hematochezia or dysuria.   The illness is also significant for constipation (reports having 1 BM every other day rated stool 4 on bristol scale; reports taking milk of magnesia PRN in the past with improvement; reports straining at times) and back pain (hx of back pain; resolved). The illness does not include chills, anorexia, dysphagia, odynophagia or bloating. Associated symptoms comments: EGD 09/12/22 - LA Grade A reflux esophagitis with no bleeding. 6 cm hiatal hernia. Erythematous mucosa in the antrum. Normal duodenal bulb, first portion of the duodenum and second portion of the duodenum. Significant associated medical issues include GERD (reports he seldomly gets GERD/heartburn; typically depends on diet and what he eats). Associated medical issues do not include inflammatory bowel disease, gallstones, liver disease, alcohol abuse, PUD, gastric bypass, bowel resection, irritable bowel syndrome, hemorrhoids or diverticulitis. Associated medical issues comments: Hx of h. pylori infection treated after EGD with quadruple therapy x14 days by Dr. May 08/01/22; biopsies on repeat EGD 09/12/22 came back positive again for h. pylori.     Review of Systems   Constitutional:  Negative for activity change, appetite change, chills, diaphoresis, fatigue, fever, unexpected weight change and weight loss.   HENT:  Negative for sore throat and trouble swallowing.    Respiratory:  Negative for cough, choking and shortness of breath.    Cardiovascular:  Negative for chest pain.   Gastrointestinal:  " "Positive for constipation (reports having 1 BM every other day rated stool 4 on bristol scale; reports taking milk of magnesia PRN in the past with improvement; reports straining at times) and melena (resolved; denies black stools for "at least a couple of weeks"). Negative for abdominal distention, abdominal pain, anal bleeding, anorexia, bloating, blood in stool, diarrhea, dysphagia, hematemesis, hematochezia, jaundice, nausea, rectal pain and vomiting.   Genitourinary:  Negative for dysuria.   Musculoskeletal:  Positive for back pain (hx of back pain; resolved).       No LMP for male patient.  Past Medical History:   Diagnosis Date    Actinic keratosis     Benign prostatic hyperplasia     Chronic kidney disease     Stage 3     Fibromyositis     Gouty arthropathy     Hyperlipidemia     Hypertension     Osteoarthritis of knee      Past Surgical History:   Procedure Laterality Date    APPENDECTOMY      [id number]  2011    COLONOSCOPY      COLONOSCOPY N/A 2022    Procedure: COLONOSCOPY;  Surgeon: Edgar May MD;  Location: [facility];  Service: Endoscopy;  Laterality: N/A;    ESOPHAGOGASTRODUODENOSCOPY N/A 2022    Procedure: EGD (ESOPHAGOGASTRODUODENOSCOPY);  Surgeon: Edgar May MD;  Location: [facility];  Service: Endoscopy;  Laterality: N/A;    ESOPHAGOGASTRODUODENOSCOPY N/A 2022    Procedure: EGD (ESOPHAGOGASTRODUODENOSCOPY);  Surgeon: Edgar May MD;  Location: [facility];  Service: Endoscopy;  Laterality: N/A;    UPPER GASTROINTESTINAL ENDOSCOPY       Family History   Problem Relation Age of Onset    Hypertension Mother     Coronary artery disease Mother     Colon cancer Neg Hx     Crohn's disease Neg Hx     Ulcerative colitis Neg Hx     Stomach cancer Neg Hx     Esophageal cancer Neg Hx      Social History     Tobacco Use    Smoking status: Former     Types: Cigarettes     Quit date:      Years since quittin.8    Smokeless tobacco: Current     Types: Chew "   Substance Use Topics    Alcohol use: Yes     Comment: occasionally    Drug use: Never     Wt Readings from Last 10 Encounters:   10/10/22 83.4 kg (183 lb 13.8 oz)   09/29/22 83.9 kg (185 lb)   09/12/22 83 kg (183 lb)   09/07/22 83.4 kg (183 lb 13.8 oz)   08/16/22 83.5 kg (184 lb)   08/01/22 81.6 kg (180 lb)   07/14/22 83.9 kg (185 lb)   07/05/22 84.7 kg (186 lb 11.7 oz)   06/16/22 86.2 kg (190 lb)   05/17/22 86.6 kg (191 lb)     Lab Results   Component Value Date    WBC 6.27 09/29/2022    HGB 14.7 09/29/2022    HCT 42.9 09/29/2022    MCV 94 09/29/2022     09/29/2022     CMP  Sodium   Date Value Ref Range Status   09/29/2022 140 136 - 145 mmol/L Final     Potassium   Date Value Ref Range Status   09/29/2022 4.9 3.5 - 5.1 mmol/L Final     Chloride   Date Value Ref Range Status   09/29/2022 107 95 - 110 mmol/L Final     CO2   Date Value Ref Range Status   09/29/2022 24 23 - 29 mmol/L Final     Glucose   Date Value Ref Range Status   09/29/2022 102 70 - 110 mg/dL Final     BUN   Date Value Ref Range Status   09/29/2022 24 10 - 30 mg/dL Final     Creatinine   Date Value Ref Range Status   09/29/2022 1.4 0.5 - 1.4 mg/dL Final     Calcium   Date Value Ref Range Status   09/29/2022 9.4 8.7 - 10.5 mg/dL Final     Total Protein   Date Value Ref Range Status   09/29/2022 6.7 6.0 - 8.4 g/dL Final     Albumin   Date Value Ref Range Status   09/29/2022 3.9 3.5 - 5.2 g/dL Final     Total Bilirubin   Date Value Ref Range Status   09/29/2022 0.7 0.1 - 1.0 mg/dL Final     Comment:     For infants and newborns, interpretation of results should be based  on gestational age, weight and in agreement with clinical  observations.    Premature Infant recommended reference ranges:  Up to 24 hours.............<8.0 mg/dL  Up to 48 hours............<12.0 mg/dL  3-5 days..................<15.0 mg/dL  6-29 days.................<15.0 mg/dL       Alkaline Phosphatase   Date Value Ref Range Status   09/29/2022 86 55 - 135 U/L Final     AST    Date Value Ref Range Status   09/29/2022 22 10 - 40 U/L Final     ALT   Date Value Ref Range Status   09/29/2022 18 10 - 44 U/L Final     Anion Gap   Date Value Ref Range Status   09/29/2022 9 8 - 16 mmol/L Final     eGFR if    Date Value Ref Range Status   05/17/2022 42 (A) >60 mL/min/1.73 m^2 Final     eGFR if non    Date Value Ref Range Status   05/17/2022 36 (A) >60 mL/min/1.73 m^2 Final     Comment:     Calculation used to obtain the estimated glomerular filtration  rate (eGFR) is the CKD-EPI equation.        Reviewed prior medical records including radiology endoscopy history (see surgical history).    Objective:      Physical Exam  Vitals and nursing note reviewed.   Constitutional:       General: He is not in acute distress.     Appearance: Normal appearance. He is not ill-appearing.   HENT:      Mouth/Throat:      Lips: Pink. No lesions.   Eyes:      Extraocular Movements: Extraocular movements intact.      Pupils: Pupils are equal, round, and reactive to light.   Cardiovascular:      Rate and Rhythm: Normal rate and regular rhythm.      Heart sounds: Normal heart sounds.   Pulmonary:      Effort: Pulmonary effort is normal. No respiratory distress.      Breath sounds: Normal breath sounds.   Abdominal:      General: Abdomen is flat. Bowel sounds are normal. There is no distension or abdominal bruit. There are no signs of injury.      Palpations: Abdomen is soft. There is no mass.      Tenderness: There is no abdominal tenderness. There is no guarding or rebound. Negative signs include Nguyễn's sign, Rovsing's sign and McBurney's sign.      Hernia: No hernia is present.   Skin:     General: Skin is warm and dry.      Coloration: Skin is not jaundiced or pale.   Neurological:      Mental Status: He is alert and oriented to person, place, and time.   Psychiatric:         Attention and Perception: Attention normal.         Mood and Affect: Mood normal.         Speech: Speech  normal.         Behavior: Behavior normal.       Assessment:       1. Follow-up exam    2. H. pylori infection    3. History of esophagitis    4. Gastroesophageal reflux disease, unspecified whether esophagitis present    5. Hiatal hernia    6. Constipation, unspecified constipation type        Plan:       Follow-up exam    H. pylori infection  -recommend re-treatment with quadruple therapy for salvage h. Pylori infection  -please complete h. Pylori stool test 12 weeks after completing antibiotic treatment to check for eradication of bacteria  -Please stop PPI 2 weeks prior to collecting h. Pylori stool sample  -     H. pylori antigen, stool; Future; Expected date: 10/10/2022  - CHANGE: pantoprazole (PROTONIX) 40 MG tablet; Take 1 tablet (40 mg total) by mouth 2 (two) times daily for 10 days, THEN 1 tablet (40 mg total) once daily.  Dispense: 30 tablet; Refill: 3  - START: bismuth subsalicylate (DIOTAME INSTYDOSE) 524 mg/30 mL suspension packet; Take 30 mLs (524 mg total) by mouth every 6 (six) hours. for 14 days  Dispense: 1680 mL; Refill: 0  -START: metroNIDAZOLE (FLAGYL) 250 MG tablet; Take 2 tablets (500 mg total) by mouth every 8 (eight) hours. for 14 days  Dispense: 84 tablet; Refill: 0  - START: tetracycline (ACHROMYCIN,SUMYCIN) 500 MG capsule; Take 1 capsule (500 mg total) by mouth every 6 (six) hours. for 14 days  Dispense: 56 capsule; Refill: 0    History of esophagitis  -Continue with protonix as directed    - Informed patient of EGD results, patient verbalized understanding    Gastroesophageal reflux disease, unspecified whether esophagitis present  -discussed about the different types of medications used to treat reflux and how to use them, antacids can be used PRN for breakthrough heartburn symptoms by reducing stomach acid that is already produced, H2 blockers work by limiting the amount acid production, & PPI's work to block acid production and are taken daily, patient verbalized  understanding.  -Educated patient on lifestyle modifications to help control/reduce reflux/abdominal pain including: avoid large meals, avoid eating within 2-3 hours of bedtime (avoid late night eating & lying down soon after eating), elevate head of bed if nocturnal symptoms are present, smoking cessation (if current smoker), & weight loss (if overweight).   -Educated to avoid known foods which trigger reflux symptoms & to minimize/avoid high-fat foods, chocolate, caffeine, citrus, alcohol, & tomato products.  -Advised to avoid/limit use of NSAID's, since they can cause GI upset, bleeding, and/or ulcers. If needed, take with food.   -Continue with protonix as directed    Hiatal hernia  -Continue with protonix as directed    Constipation, unspecified constipation type  -Recommend daily exercise as tolerated, adequate water intake (six 8-oz glasses of water daily), and high fiber diet. OTC fiber supplements are recommended if diet does not reach daily fiber goal (20-30 grams daily), such as Metamucil, Citrucel, or FiberCon (take as directed, separate from other oral medications by >2 hours).  -Recommend taking an OTC stool softener such as Colace as directed to avoid hard stools and straining with bowel movements PRN  -Recommend trying OTC MiraLax once daily (17g PO) as directed  - If no improvement with above recommendations, try intermittently dosed Dulcolax OTC as directed (every 3-4  days) PRN to facilitate bowel movements  -If still no improvement with these measures, call/follow-up  -Handout provided in clinic    Follow up in about 4 weeks (around 11/7/2022), or if symptoms worsen or fail to improve.      If no improvement in symptoms or symptoms worsen, call/follow-up at clinic or go to ER.        30 minutes of total time spent on the encounter, which includes face to face time and non-face to face time preparing to see the patient (eg, review of tests), Obtaining and/or reviewing separately obtained history,  Documenting clinical information in the electronic or other health record, Independently interpreting results (not separately reported) and communicating results to the patient/family/caregiver, or Care coordination (not separately reported).

## 2022-11-10 ENCOUNTER — OFFICE VISIT (OUTPATIENT)
Dept: FAMILY MEDICINE | Facility: CLINIC | Age: 87
End: 2022-11-10
Payer: MEDICARE

## 2022-11-10 VITALS
TEMPERATURE: 98 F | BODY MASS INDEX: 28.72 KG/M2 | SYSTOLIC BLOOD PRESSURE: 130 MMHG | HEART RATE: 58 BPM | HEIGHT: 67 IN | OXYGEN SATURATION: 99 % | WEIGHT: 183 LBS | DIASTOLIC BLOOD PRESSURE: 78 MMHG

## 2022-11-10 DIAGNOSIS — I10 ESSENTIAL HYPERTENSION: ICD-10-CM

## 2022-11-10 DIAGNOSIS — N18.31 STAGE 3A CHRONIC KIDNEY DISEASE: ICD-10-CM

## 2022-11-10 DIAGNOSIS — K25.0 ACUTE H. PYLORI GASTRIC ULCER WITH HEMORRHAGE: ICD-10-CM

## 2022-11-10 DIAGNOSIS — D50.0 IRON DEFICIENCY ANEMIA DUE TO CHRONIC BLOOD LOSS: Primary | ICD-10-CM

## 2022-11-10 DIAGNOSIS — B96.81 ACUTE H. PYLORI GASTRIC ULCER WITH HEMORRHAGE: ICD-10-CM

## 2022-11-10 PROCEDURE — 99214 PR OFFICE/OUTPT VISIT, EST, LEVL IV, 30-39 MIN: ICD-10-PCS | Mod: S$PBB,,, | Performed by: FAMILY MEDICINE

## 2022-11-10 PROCEDURE — 99999 PR PBB SHADOW E&M-EST. PATIENT-LVL III: ICD-10-PCS | Mod: PBBFAC,,, | Performed by: FAMILY MEDICINE

## 2022-11-10 PROCEDURE — 99214 OFFICE O/P EST MOD 30 MIN: CPT | Mod: S$PBB,,, | Performed by: FAMILY MEDICINE

## 2022-11-10 PROCEDURE — 99213 OFFICE O/P EST LOW 20 MIN: CPT | Mod: PBBFAC,PN | Performed by: FAMILY MEDICINE

## 2022-11-10 PROCEDURE — 99999 PR PBB SHADOW E&M-EST. PATIENT-LVL III: CPT | Mod: PBBFAC,,, | Performed by: FAMILY MEDICINE

## 2022-11-10 NOTE — PROGRESS NOTES
SUBJECTIVE:    Patient ID: Vel Banda is a 89 y.o. male.    Chief Complaint: Follow-up and Hypertension (Pt here for 3 month check up/See labs)    Follow-up on this 89-year-old male.    He had a Helicobacter pylori ulcer bleed.    Current CBC is back to normal hemoglobin and hematocrit    Iron and TIBC levels are normal.        He does complain of bilateral lower leg weakness when walking.    He does not describe classic vascular claudication with cramps, just weakness          Lab Visit on 09/29/2022   Component Date Value Ref Range Status    WBC 09/29/2022 6.27  3.90 - 12.70 K/uL Final    RBC 09/29/2022 4.57 (L)  4.60 - 6.20 M/uL Final    Hemoglobin 09/29/2022 14.7  14.0 - 18.0 g/dL Final    Hematocrit 09/29/2022 42.9  40.0 - 54.0 % Final    MCV 09/29/2022 94  82 - 98 fL Final    MCH 09/29/2022 32.2 (H)  27.0 - 31.0 pg Final    MCHC 09/29/2022 34.3  32.0 - 36.0 g/dL Final    RDW 09/29/2022 16.7 (H)  11.5 - 14.5 % Final    Platelets 09/29/2022 186  150 - 450 K/uL Final    MPV 09/29/2022 10.5  9.2 - 12.9 fL Final    Immature Granulocytes 09/29/2022 0.3  0.0 - 0.5 % Final    Gran # (ANC) 09/29/2022 4.1  1.8 - 7.7 K/uL Final    Immature Grans (Abs) 09/29/2022 0.02  0.00 - 0.04 K/uL Final    Lymph # 09/29/2022 1.3  1.0 - 4.8 K/uL Final    Mono # 09/29/2022 0.6  0.3 - 1.0 K/uL Final    Eos # 09/29/2022 0.3  0.0 - 0.5 K/uL Final    Baso # 09/29/2022 0.07  0.00 - 0.20 K/uL Final    nRBC 09/29/2022 0  0 /100 WBC Final    Gran % 09/29/2022 64.9  38.0 - 73.0 % Final    Lymph % 09/29/2022 20.3  18.0 - 48.0 % Final    Mono % 09/29/2022 9.3  4.0 - 15.0 % Final    Eosinophil % 09/29/2022 4.1  0.0 - 8.0 % Final    Basophil % 09/29/2022 1.1  0.0 - 1.9 % Final    Differential Method 09/29/2022 Automated   Final    Iron 09/29/2022 115  45 - 160 ug/dL Final    Transferrin 09/29/2022 212  200 - 375 mg/dL Final    TIBC 09/29/2022 314  250 - 450 ug/dL Final    Saturated Iron 09/29/2022 37  20 - 50 % Final    Sodium 09/29/2022  140  136 - 145 mmol/L Final    Potassium 09/29/2022 4.9  3.5 - 5.1 mmol/L Final    Chloride 09/29/2022 107  95 - 110 mmol/L Final    CO2 09/29/2022 24  23 - 29 mmol/L Final    Glucose 09/29/2022 102  70 - 110 mg/dL Final    BUN 09/29/2022 24  10 - 30 mg/dL Final    Creatinine 09/29/2022 1.4  0.5 - 1.4 mg/dL Final    Calcium 09/29/2022 9.4  8.7 - 10.5 mg/dL Final    Total Protein 09/29/2022 6.7  6.0 - 8.4 g/dL Final    Albumin 09/29/2022 3.9  3.5 - 5.2 g/dL Final    Total Bilirubin 09/29/2022 0.7  0.1 - 1.0 mg/dL Final    Alkaline Phosphatase 09/29/2022 86  55 - 135 U/L Final    AST 09/29/2022 22  10 - 40 U/L Final    ALT 09/29/2022 18  10 - 44 U/L Final    Anion Gap 09/29/2022 9  8 - 16 mmol/L Final    eGFR 09/29/2022 46 (A)  >60 mL/min/1.73 m^2 Final   Admission on 09/12/2022, Discharged on 09/12/2022   Component Date Value Ref Range Status    Final Pathologic Diagnosis 09/12/2022  (A)   Final                    Value:1. Stomach, biopsy:  - Helicobacter pylori-associated chronic active gastritis  - Intestinal metaplasia present, negative for dysplasia  2. Gastroesophageal junction, biopsy:  - Chronic active carditis, compatible with reflux carditis  - Chronic esophagitis, compatible with reflux esophagitis  - No significantly increased eosinophils  - Negative for intestinal metaplasia or dysplasia      Microscopic Exam 09/12/2022  (A)   Final                    Value:1. Histologic sections show gastric mucosa with a dense lymphoplasmacytic  inflammatory infiltrate admixed with prominent active inflammation.  Several  biopsy pieces are involved by intestinal metaplasia without evidence of  dysplasia or carcinoma. Numerous bacterial organisms consistent with  Helicobacter pylori are identified overlying the surface epithelium and  within gastric pits on H&E sections.  The overall findings are in keeping  with the above diagnosis.  2. Microscopic examination is performed and the results are incorporated into  the  "above findings.      Gross 09/12/2022  (A)   Final                    Value:Number of containers:  2  Part 1  Container Label: Clinic Number/AP Number:  [id number], and "gastric Bx"  Received in formalin are 5 soft, tan tissue fragments ranging from 2-5 mm in  greatest dimension.  Specimen is stained with Hematoxylin and entirely  submitted in YFT--1-A.  Part 2  Container Label: Clinic Number/AP Number:  [id number], and "GEJ Bx"  Received in formalin is a 7 x 6 x 1 mm aggregate of soft, tan tissue  fragments.  Specimen is filtered, stained with Hematoxylin, and entirely  submitted in YTQ--2-A.  CORINNE Lee.      Disclaimer 09/12/2022  (A)   Final                    Value:Unless the case is a 'gross only' or additional testing only, the final  diagnosis for each specimen is based on a microscopic examination of  appropriate tissue sections.     Lab Visit on 08/15/2022   Component Date Value Ref Range Status    WBC 08/15/2022 6.87  3.90 - 12.70 K/uL Final    RBC 08/15/2022 4.74  4.60 - 6.20 M/uL Final    Hemoglobin 08/15/2022 14.2  14.0 - 18.0 g/dL Final    Hematocrit 08/15/2022 41.8  40.0 - 54.0 % Final    MCV 08/15/2022 88  82 - 98 fL Final    MCH 08/15/2022 30.0  27.0 - 31.0 pg Final    MCHC 08/15/2022 34.0  32.0 - 36.0 g/dL Final    RDW 08/15/2022 19.4 (H)  11.5 - 14.5 % Final    Platelets 08/15/2022 198  150 - 450 K/uL Final    MPV 08/15/2022 10.5  9.2 - 12.9 fL Final    Immature Granulocytes 08/15/2022 0.1  0.0 - 0.5 % Final    Gran # (ANC) 08/15/2022 4.0  1.8 - 7.7 K/uL Final    Immature Grans (Abs) 08/15/2022 0.01  0.00 - 0.04 K/uL Final    Lymph # 08/15/2022 1.9  1.0 - 4.8 K/uL Final    Mono # 08/15/2022 0.6  0.3 - 1.0 K/uL Final    Eos # 08/15/2022 0.3  0.0 - 0.5 K/uL Final    Baso # 08/15/2022 0.07  0.00 - 0.20 K/uL Final    nRBC 08/15/2022 0  0 /100 WBC Final    Gran % 08/15/2022 57.7  38.0 - 73.0 % Final    Lymph % 08/15/2022 27.8  18.0 - 48.0 % Final    Mono % 08/15/2022 8.9  4.0 - 15.0 % Final " "   Eosinophil % 08/15/2022 4.5  0.0 - 8.0 % Final    Basophil % 08/15/2022 1.0  0.0 - 1.9 % Final    Differential Method 08/15/2022 Automated   Final   Admission on 08/01/2022, Discharged on 08/01/2022   Component Date Value Ref Range Status    Final Pathologic Diagnosis 08/01/2022    Final                    Value:1. 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  Numerous Helicobacter pylori organisms are identified.  Focal intestinal metaplasia is identified.  Negative for dysplasia.      Gross 08/01/2022    Final                    Value:Container Label: Clinic Number/AP Number:  [id number], and "gastric Bx"  Received in formalin are 4 soft tan-white tissue fragments ranging from 2-5  mm in greatest dimension.  Specimen is stained with Hematoxylin and entirely  submitted in PCY--1-A.  CORINNE Lee.      Disclaimer 08/01/2022    Final                    Value:Unless the case is a 'gross only' or additional testing only, the final  diagnosis for each specimen is based on a microscopic examination of  appropriate tissue sections.     Lab Visit on 06/16/2022   Component Date Value Ref Range Status    WBC 06/16/2022 6.58  3.90 - 12.70 K/uL Final    RBC 06/16/2022 4.32 (L)  4.60 - 6.20 M/uL Final    Hemoglobin 06/16/2022 11.0 (L)  14.0 - 18.0 g/dL Final    Hematocrit 06/16/2022 35.4 (L)  40.0 - 54.0 % Final    MCV 06/16/2022 82  82 - 98 fL Final    MCH 06/16/2022 25.5 (L)  27.0 - 31.0 pg Final    MCHC 06/16/2022 31.1 (L)  32.0 - 36.0 g/dL Final    RDW 06/16/2022 SEE COMMENT  11.5 - 14.5 % Final    Platelets 06/16/2022 225  150 - 450 K/uL Final    MPV 06/16/2022 10.5  9.2 - 12.9 fL Final    Immature Granulocytes 06/16/2022 0.5  0.0 - 0.5 % Final    Gran # (ANC) 06/16/2022 4.2  1.8 - 7.7 K/uL Final    Immature Grans (Abs) 06/16/2022 0.03  0.00 - 0.04 K/uL Final    Lymph # 06/16/2022 1.4  1.0 - 4.8 K/uL Final    Mono # 06/16/2022 0.6  0.3 - 1.0 K/uL Final "    Eos # 06/16/2022 0.2  0.0 - 0.5 K/uL Final    Baso # 06/16/2022 0.10  0.00 - 0.20 K/uL Final    nRBC 06/16/2022 0  0 /100 WBC Final    Gran % 06/16/2022 63.8  38.0 - 73.0 % Final    Lymph % 06/16/2022 21.9  18.0 - 48.0 % Final    Mono % 06/16/2022 9.0  4.0 - 15.0 % Final    Eosinophil % 06/16/2022 3.3  0.0 - 8.0 % Final    Basophil % 06/16/2022 1.5  0.0 - 1.9 % Final    Platelet Estimate 06/16/2022 Appears normal   Final    Aniso 06/16/2022 Moderate   Final    Poik 06/16/2022 Slight   Final    Poly 06/16/2022 Occasional   Final    Hypo 06/16/2022 Occasional   Final    Ovalocytes 06/16/2022 Occasional   Final    Tear Drop Cells 06/16/2022 Occasional   Final    Schistocytes 06/16/2022 Present   Final    Fragmented Cells 06/16/2022 Occasional   Final    Differential Method 06/16/2022 Automated   Final    Retic 06/16/2022 2.7 (H)  0.4 - 2.0 % Final    Iron 06/16/2022 218 (H)  45 - 160 ug/dL Final    Transferrin 06/16/2022 256  200 - 375 mg/dL Final    TIBC 06/16/2022 379  250 - 450 ug/dL Final    Saturated Iron 06/16/2022 58 (H)  20 - 50 % Final   Lab Visit on 06/09/2022   Component Date Value Ref Range Status    Fecal Immunochemical Test (iFOBT) 06/14/2022 Positive (A)  Negative Final   Lab Visit on 05/17/2022   Component Date Value Ref Range Status    WBC 05/17/2022 5.70  3.90 - 12.70 K/uL Final    RBC 05/17/2022 3.35 (L)  4.60 - 6.20 M/uL Final    Hemoglobin 05/17/2022 6.4 (L)  14.0 - 18.0 g/dL Final    Hematocrit 05/17/2022 23.3 (L)  40.0 - 54.0 % Final    MCV 05/17/2022 70 (L)  82 - 98 fL Final    MCH 05/17/2022 19.1 (L)  27.0 - 31.0 pg Final    MCHC 05/17/2022 27.5 (L)  32.0 - 36.0 g/dL Final    RDW 05/17/2022 19.7 (H)  11.5 - 14.5 % Final    Platelets 05/17/2022 262  150 - 450 K/uL Final    MPV 05/17/2022 10.3  9.2 - 12.9 fL Final    Immature Granulocytes 05/17/2022 0.2  0.0 - 0.5 % Final    Gran # (ANC) 05/17/2022 3.3  1.8 - 7.7 K/uL Final    Immature Grans (Abs) 05/17/2022 0.01  0.00 - 0.04 K/uL Final     Lymph # 05/17/2022 1.5  1.0 - 4.8 K/uL Final    Mono # 05/17/2022 0.6  0.3 - 1.0 K/uL Final    Eos # 05/17/2022 0.2  0.0 - 0.5 K/uL Final    Baso # 05/17/2022 0.05  0.00 - 0.20 K/uL Final    nRBC 05/17/2022 0  0 /100 WBC Final    Gran % 05/17/2022 58.2  38.0 - 73.0 % Final    Lymph % 05/17/2022 26.1  18.0 - 48.0 % Final    Mono % 05/17/2022 10.9  4.0 - 15.0 % Final    Eosinophil % 05/17/2022 3.7  0.0 - 8.0 % Final    Basophil % 05/17/2022 0.9  0.0 - 1.9 % Final    Platelet Estimate 05/17/2022 Appears normal   Final    Aniso 05/17/2022 Slight   Final    Poik 05/17/2022 Slight   Final    Poly 05/17/2022 Occasional   Final    Hypo 05/17/2022 Occasional   Final    Ovalocytes 05/17/2022 Occasional   Final    Tear Drop Cells 05/17/2022 Occasional   Final    Differential Method 05/17/2022 Automated   Final    Retic 05/17/2022 2.1 (H)  0.4 - 2.0 % Final    Sodium 05/17/2022 140  136 - 145 mmol/L Final    Potassium 05/17/2022 4.5  3.5 - 5.1 mmol/L Final    Chloride 05/17/2022 109  95 - 110 mmol/L Final    CO2 05/17/2022 22 (L)  23 - 29 mmol/L Final    Glucose 05/17/2022 101  70 - 110 mg/dL Final    BUN 05/17/2022 31 (H)  10 - 30 mg/dL Final    Creatinine 05/17/2022 1.6 (H)  0.5 - 1.4 mg/dL Final    Calcium 05/17/2022 8.7  8.7 - 10.5 mg/dL Final    Total Protein 05/17/2022 6.7  6.0 - 8.4 g/dL Final    Albumin 05/17/2022 3.7  3.5 - 5.2 g/dL Final    Total Bilirubin 05/17/2022 0.3  0.1 - 1.0 mg/dL Final    Alkaline Phosphatase 05/17/2022 80  55 - 135 U/L Final    AST 05/17/2022 16  10 - 40 U/L Final    ALT 05/17/2022 14  10 - 44 U/L Final    Anion Gap 05/17/2022 9  8 - 16 mmol/L Final    eGFR if  05/17/2022 42 (A)  >60 mL/min/1.73 m^2 Final    eGFR if non  05/17/2022 36 (A)  >60 mL/min/1.73 m^2 Final       Past Medical History:   Diagnosis Date    Actinic keratosis     Benign prostatic hyperplasia     Chronic kidney disease     Stage 3     Fibromyositis     Gouty arthropathy     Hyperlipidemia      Hypertension     Osteoarthritis of knee      Social History     Socioeconomic History    Marital status:    Tobacco Use    Smoking status: Former     Types: Cigarettes     Quit date:      Years since quittin.8    Smokeless tobacco: Current     Types: Chew   Substance and Sexual Activity    Alcohol use: Yes     Comment: occasionally    Drug use: Never     Past Surgical History:   Procedure Laterality Date    APPENDECTOMY      [email or web address]  2011    COLONOSCOPY      COLONOSCOPY N/A 2022    Procedure: COLONOSCOPY;  Surgeon: Edgar May MD;  Location: [facility] ENDO;  Service: Endoscopy;  Laterality: N/A;    ESOPHAGOGASTRODUODENOSCOPY N/A 2022    Procedure: EGD (ESOPHAGOGASTRODUODENOSCOPY);  Surgeon: Edgar May MD;  Location: [facility] ENDO;  Service: Endoscopy;  Laterality: N/A;    ESOPHAGOGASTRODUODENOSCOPY N/A 2022    Procedure: EGD (ESOPHAGOGASTRODUODENOSCOPY);  Surgeon: Edgar May MD;  Location: [facility] ENDO;  Service: Endoscopy;  Laterality: N/A;    UPPER GASTROINTESTINAL ENDOSCOPY       Family History   Problem Relation Age of Onset    Hypertension Mother     Coronary artery disease Mother     Colon cancer Neg Hx     Crohn's disease Neg Hx     Ulcerative colitis Neg Hx     Stomach cancer Neg Hx     Esophageal cancer Neg Hx        Review of patient's allergies indicates:   Allergen Reactions    Pcn [penicillins]        Current Outpatient Medications:     [START ON 2022] allopurinoL (ZYLOPRIM) 300 MG tablet, Take 1 tablet (300 mg total) by mouth once daily., Disp: 90 tablet, Rfl: 3    aspirin 81 MG Chew, Take 81 mg by mouth once daily., Disp: , Rfl:     [START ON 2022] cholestyramine, with sugar, 4 gram Powd, Take 1 Scoop by mouth once daily., Disp: 378 g, Rfl: 11    ferrous sulfate 325 (65 FE) MG EC tablet, Take 1 tablet (325 mg total) by mouth 2 (two) times daily. (Patient taking differently: Take 325 mg by mouth 2 (two) times daily. Taking 1 qd), Disp:  "60 tablet, Rfl: 5    [START ON 12/9/2022] hydroCHLOROthiazide (MICROZIDE) 12.5 mg capsule, Take 1 capsule (12.5 mg total) by mouth once daily., Disp: 90 capsule, Rfl: 3    [START ON 12/9/2022] lisinopriL (PRINIVIL,ZESTRIL) 20 MG tablet, Take 1 tablet (20 mg total) by mouth once daily., Disp: 90 tablet, Rfl: 3    pantoprazole (PROTONIX) 40 MG tablet, Take 1 tablet (40 mg total) by mouth 2 (two) times daily for 10 days, THEN 1 tablet (40 mg total) once daily., Disp: 30 tablet, Rfl: 3    [START ON 12/9/2022] tamsulosin (FLOMAX) 0.4 mg Cap, Take 1 capsule (0.4 mg total) by mouth once daily., Disp: 90 capsule, Rfl: 3    travoprost, benzalkonium, (TRAVATAN) 0.004 % ophthalmic solution, 1 drop every evening., Disp: , Rfl:     sucralfate (CARAFATE) 1 gram tablet, Take 1 g by mouth 4 (four) times daily., Disp: , Rfl:     Review of Systems   Constitutional:               Per HPI    Legs get weak when walking- no cramps  No chest pain.             Objective:      Vitals:    11/10/22 1008   BP: 130/78   Pulse: (!) 58   Temp: 97.8 °F (36.6 °C)   SpO2: 99%   Weight: 83 kg (183 lb)   Height: 5' 7" (1.702 m)     Physical Exam  Constitutional:       Comments:     Distant heart sounds  Regular rate and rhythm.  S1 and S2 are normal.    No S3 or S4 appreciated.    No significant murmur auscultated  No carotid bruits.    Lungs are clear to auscultation.    No peripheral edema.  Conjunctiva are pink         Assessment:       1. Iron deficiency anemia due to chronic blood loss    2. Acute H. pylori gastric ulcer with hemorrhage    3. Essential hypertension    4. Stage 3a chronic kidney disease           Plan:       Iron deficiency anemia due to chronic blood loss  -     CBC Auto Differential; Future; Expected date: 03/10/2023    Acute H. pylori gastric ulcer with hemorrhage  -     CBC Auto Differential; Future; Expected date: 03/10/2023    Essential hypertension    Stage 3a chronic kidney disease    Follow up in about 4 months (around " 3/10/2023) for Dr Beebe.      Decrease iron to 1 tablet on Monday ,Wednesday ,and Friday  11/10/2022 Devon Medrano M.D.

## 2022-11-22 ENCOUNTER — CLINICAL SUPPORT (OUTPATIENT)
Dept: FAMILY MEDICINE | Facility: CLINIC | Age: 87
End: 2022-11-22
Payer: MEDICARE

## 2022-11-22 PROCEDURE — G0008 ADMIN INFLUENZA VIRUS VAC: HCPCS | Mod: PBBFAC,PN

## 2023-01-03 ENCOUNTER — OFFICE VISIT (OUTPATIENT)
Dept: FAMILY MEDICINE | Facility: CLINIC | Age: 88
End: 2023-01-03
Payer: MEDICARE

## 2023-01-03 ENCOUNTER — TELEPHONE (OUTPATIENT)
Dept: FAMILY MEDICINE | Facility: CLINIC | Age: 88
End: 2023-01-03
Payer: MEDICARE

## 2023-01-03 ENCOUNTER — HOSPITAL ENCOUNTER (OUTPATIENT)
Dept: RADIOLOGY | Facility: CLINIC | Age: 88
Discharge: HOME | End: 2023-01-03
Attending: FAMILY MEDICINE
Payer: MEDICARE

## 2023-01-03 VITALS
DIASTOLIC BLOOD PRESSURE: 70 MMHG | TEMPERATURE: 98 F | HEIGHT: 67 IN | BODY MASS INDEX: 28.96 KG/M2 | OXYGEN SATURATION: 98 % | SYSTOLIC BLOOD PRESSURE: 126 MMHG | HEART RATE: 72 BPM | WEIGHT: 184.5 LBS

## 2023-01-03 DIAGNOSIS — I10 ESSENTIAL HYPERTENSION: ICD-10-CM

## 2023-01-03 DIAGNOSIS — N40.0 BENIGN PROSTATIC HYPERPLASIA, UNSPECIFIED WHETHER LOWER URINARY TRACT SYMPTOMS PRESENT: ICD-10-CM

## 2023-01-03 DIAGNOSIS — M25.661 KNEE JOINT STIFFNESS, BILATERAL: ICD-10-CM

## 2023-01-03 DIAGNOSIS — M25.662 KNEE JOINT STIFFNESS, BILATERAL: Primary | ICD-10-CM

## 2023-01-03 DIAGNOSIS — M25.662 KNEE JOINT STIFFNESS, BILATERAL: ICD-10-CM

## 2023-01-03 DIAGNOSIS — A04.8 H. PYLORI INFECTION: ICD-10-CM

## 2023-01-03 DIAGNOSIS — M1A.00X0 CHRONIC GOUTY ARTHROPATHY: ICD-10-CM

## 2023-01-03 DIAGNOSIS — M25.661 KNEE JOINT STIFFNESS, BILATERAL: Primary | ICD-10-CM

## 2023-01-03 DIAGNOSIS — E78.2 MIXED HYPERLIPIDEMIA: ICD-10-CM

## 2023-01-03 PROCEDURE — 99213 OFFICE O/P EST LOW 20 MIN: CPT | Mod: ,,, | Performed by: FAMILY MEDICINE

## 2023-01-03 PROCEDURE — 73560 XR KNEE 1 OR 2 VIEW BILATERAL: ICD-10-PCS | Mod: 26,50,, | Performed by: RADIOLOGY

## 2023-01-03 PROCEDURE — 99213 PR OFFICE/OUTPT VISIT, EST, LEVL III, 20-29 MIN: ICD-10-PCS | Mod: ,,, | Performed by: FAMILY MEDICINE

## 2023-01-03 PROCEDURE — 73560 X-RAY EXAM OF KNEE 1 OR 2: CPT | Mod: 50,TC,RHCUB | Performed by: FAMILY MEDICINE

## 2023-01-03 PROCEDURE — 73560 X-RAY EXAM OF KNEE 1 OR 2: CPT | Mod: 26,50,, | Performed by: RADIOLOGY

## 2023-01-03 RX ORDER — CHOLESTYRAMINE 4 G/9G
1 POWDER, FOR SUSPENSION ORAL DAILY
Qty: 378 G | Refills: 11 | Status: SHIPPED | OUTPATIENT
Start: 2023-01-03 | End: 2023-09-12 | Stop reason: SDUPTHER

## 2023-01-03 RX ORDER — TAMSULOSIN HYDROCHLORIDE 0.4 MG/1
0.4 CAPSULE ORAL DAILY
Qty: 90 CAPSULE | Refills: 3 | Status: SHIPPED | OUTPATIENT
Start: 2023-01-03 | End: 2024-03-19

## 2023-01-03 RX ORDER — LISINOPRIL 20 MG/1
20 TABLET ORAL DAILY
Qty: 90 TABLET | Refills: 3 | Status: SHIPPED | OUTPATIENT
Start: 2023-01-03 | End: 2024-01-03

## 2023-01-03 RX ORDER — LATANOPROST 50 UG/ML
1 SOLUTION/ DROPS OPHTHALMIC
COMMUNITY
Start: 2022-11-17

## 2023-01-03 RX ORDER — ALLOPURINOL 300 MG/1
300 TABLET ORAL DAILY
Qty: 90 TABLET | Refills: 3 | Status: SHIPPED | OUTPATIENT
Start: 2023-01-03 | End: 2024-03-19

## 2023-01-03 RX ORDER — HYDROCHLOROTHIAZIDE 12.5 MG/1
12.5 CAPSULE ORAL DAILY
Qty: 90 CAPSULE | Refills: 3 | Status: SHIPPED | OUTPATIENT
Start: 2023-01-03 | End: 2024-01-03

## 2023-01-03 NOTE — PROGRESS NOTES
Subjective:       Patient ID: Vel Banda is a 89 y.o. male.    Chief Complaint: Establish Care (Patient is a former pt of Dr. Medrano and is in need of a new PCP. ) and Medication Refill (Patient is here to refill medications. )    Mr Edmondson is a 89 yo male, former patient of Dr Medrano, who is here to establish care and do his 3 month routine follow up. Last labs were in September of 2022. His diagnosis are as follows:    Patient Active Problem List  Diagnosis  · Benign prostatic hyperplasia  · Essential hypertension  · Fibromyositis  · Chronic gouty arthropathy  · Mixed hyperlipidemia  · Osteoarthritis of knee  · Stage 3 chronic kidney disease  · Actinic keratosis  · Acute H. pylori gastric ulcer with hemorrhage (treated)  · Iron deficiency anemia due to chronic blood loss    He has no complaints, other than routine joint stiffness expected in a 89 yo man. He is a retired [employer] .     Review of Systems   Constitutional:  Negative for activity change, appetite change, chills, diaphoresis and fever.   HENT:  Negative for congestion, ear pain, postnasal drip, rhinorrhea and sore throat.    Eyes:  Negative for pain, discharge, redness and itching.   Respiratory:  Negative for cough and shortness of breath.    Cardiovascular:  Negative for chest pain, palpitations and leg swelling.   Gastrointestinal:  Negative for abdominal distention, abdominal pain, constipation, diarrhea and nausea.   Genitourinary:  Negative for difficulty urinating, dysuria, frequency and urgency.   Musculoskeletal:  Positive for arthralgias.   Skin:  Negative for color change, rash and wound.   All other systems reviewed and are negative.    Patient Active Problem List   Diagnosis    Benign prostatic hyperplasia    Essential hypertension    Fibromyositis    Chronic gouty arthropathy    Mixed hyperlipidemia    Osteoarthritis of knee    Stage 3 chronic kidney disease    Actinic keratosis    Acute H. pylori gastric ulcer  "with hemorrhage    Iron deficiency anemia due to chronic blood loss       Objective:      Physical Exam  Vitals and nursing note reviewed.   Constitutional:       Appearance: Normal appearance. He is normal weight.   HENT:      Head: Normocephalic.      Nose: Nose normal.   Eyes:      Extraocular Movements: Extraocular movements intact.      Conjunctiva/sclera: Conjunctivae normal.      Pupils: Pupils are equal, round, and reactive to light.   Cardiovascular:      Rate and Rhythm: Normal rate and regular rhythm.      Heart sounds: Normal heart sounds. No murmur heard.  Pulmonary:      Effort: Pulmonary effort is normal. No respiratory distress.      Breath sounds: Normal breath sounds.   Musculoskeletal:         General: Tenderness present. Normal range of motion.      Cervical back: Normal range of motion and neck supple.      Comments: bilateral knee stiffness.    Skin:     General: Skin is warm and dry.   Neurological:      General: No focal deficit present.      Mental Status: He is alert and oriented to person, place, and time.   Psychiatric:         Mood and Affect: Mood normal.         Behavior: Behavior normal.       Lab Results   Component Value Date    WBC 6.27 09/29/2022    HGB 14.7 09/29/2022    HCT 42.9 09/29/2022     09/29/2022    CHOL 142 12/09/2021    TRIG 72 12/09/2021    HDL 38 (L) 12/09/2021    ALT 18 09/29/2022    AST 22 09/29/2022     09/29/2022    K 4.9 09/29/2022     09/29/2022    CREATININE 1.4 09/29/2022    BUN 24 09/29/2022    CO2 24 09/29/2022     The ASCVD Risk score (Susan DK, et al., 2019) failed to calculate for the following reasons:    The 2019 ASCVD risk score is only valid for ages 40 to 79  Visit Vitals  /70 (BP Location: Right arm, Patient Position: Sitting, BP Method: Large (Manual))   Pulse 72   Temp 98.1 °F (36.7 °C) (Oral)   Ht 5' 7" (1.702 m)   Wt 83.7 kg (184 lb 8.4 oz)   SpO2 98%   BMI 28.90 kg/m²      Assessment:       1. H. pylori infection  "   2. Chronic gouty arthropathy    3. Essential hypertension    4. Benign prostatic hyperplasia, unspecified whether lower urinary tract symptoms present    5. Mixed hyperlipidemia        Plan:       1. H. pylori infection    2. Chronic gouty arthropathy  -     allopurinoL (ZYLOPRIM) 300 MG tablet; Take 1 tablet (300 mg total) by mouth once daily.  Dispense: 90 tablet; Refill: 3    3. Essential hypertension  -     lisinopriL (PRINIVIL,ZESTRIL) 20 MG tablet; Take 1 tablet (20 mg total) by mouth once daily.  Dispense: 90 tablet; Refill: 3  -     hydroCHLOROthiazide (MICROZIDE) 12.5 mg capsule; Take 1 capsule (12.5 mg total) by mouth once daily.  Dispense: 90 capsule; Refill: 3    4. Benign prostatic hyperplasia, unspecified whether lower urinary tract symptoms present  -     tamsulosin (FLOMAX) 0.4 mg Cap; Take 1 capsule (0.4 mg total) by mouth once daily.  Dispense: 90 capsule; Refill: 3    5. Mixed hyperlipidemia  -     cholestyramine, with sugar, 4 gram Powd; Take 1 Scoop by mouth once daily.  Dispense: 378 g; Refill: 11       Follow up in about 6 months (around 7/3/2023).      Future Appointments       Date Provider Specialty Appt Notes    3/6/2023  Lab labs    3/8/2023 Anne Beebe MD Family Medicine 4 month ck htn ref,by dr daly

## 2023-02-13 PROBLEM — B96.81 ACUTE H. PYLORI GASTRIC ULCER WITH HEMORRHAGE: Status: RESOLVED | Noted: 2022-11-10 | Resolved: 2023-02-13

## 2023-02-13 PROBLEM — K25.0 ACUTE H. PYLORI GASTRIC ULCER WITH HEMORRHAGE: Status: RESOLVED | Noted: 2022-11-10 | Resolved: 2023-02-13

## 2023-03-06 ENCOUNTER — LAB VISIT (OUTPATIENT)
Dept: LAB | Facility: CLINIC | Age: 88
End: 2023-03-06
Payer: MEDICARE

## 2023-03-06 DIAGNOSIS — K25.0 ACUTE H. PYLORI GASTRIC ULCER WITH HEMORRHAGE: ICD-10-CM

## 2023-03-06 DIAGNOSIS — B96.81 ACUTE H. PYLORI GASTRIC ULCER WITH HEMORRHAGE: ICD-10-CM

## 2023-03-06 DIAGNOSIS — D50.0 IRON DEFICIENCY ANEMIA DUE TO CHRONIC BLOOD LOSS: ICD-10-CM

## 2023-03-06 LAB
BASOPHILS # BLD AUTO: 0.07 K/UL (ref 0–0.2)
BASOPHILS NFR BLD: 0.9 % (ref 0–1.9)
DIFFERENTIAL METHOD: ABNORMAL
EOSINOPHIL # BLD AUTO: 0.3 K/UL (ref 0–0.5)
EOSINOPHIL NFR BLD: 4.1 % (ref 0–8)
ERYTHROCYTE [DISTWIDTH] IN BLOOD BY AUTOMATED COUNT: 14.9 % (ref 11.5–14.5)
HCT VFR BLD AUTO: 42.3 % (ref 40–54)
HGB BLD-MCNC: 14.6 G/DL (ref 14–18)
IMM GRANULOCYTES # BLD AUTO: 0.03 K/UL (ref 0–0.04)
IMM GRANULOCYTES NFR BLD AUTO: 0.4 % (ref 0–0.5)
LYMPHOCYTES # BLD AUTO: 1.9 K/UL (ref 1–4.8)
LYMPHOCYTES NFR BLD: 23.9 % (ref 18–48)
MCH RBC QN AUTO: 34 PG (ref 27–31)
MCHC RBC AUTO-ENTMCNC: 34.5 G/DL (ref 32–36)
MCV RBC AUTO: 99 FL (ref 82–98)
MONOCYTES # BLD AUTO: 0.7 K/UL (ref 0.3–1)
MONOCYTES NFR BLD: 9.4 % (ref 4–15)
NEUTROPHILS # BLD AUTO: 4.8 K/UL (ref 1.8–7.7)
NEUTROPHILS NFR BLD: 61.3 % (ref 38–73)
NRBC BLD-RTO: 0 /100 WBC
PLATELET # BLD AUTO: 174 K/UL (ref 150–450)
PMV BLD AUTO: 10.8 FL (ref 9.2–12.9)
RBC # BLD AUTO: 4.29 M/UL (ref 4.6–6.2)
WBC # BLD AUTO: 7.78 K/UL (ref 3.9–12.7)

## 2023-03-06 PROCEDURE — 85025 COMPLETE CBC W/AUTO DIFF WBC: CPT | Performed by: FAMILY MEDICINE

## 2023-03-08 ENCOUNTER — OFFICE VISIT (OUTPATIENT)
Dept: FAMILY MEDICINE | Facility: CLINIC | Age: 88
End: 2023-03-08
Payer: MEDICARE

## 2023-03-08 VITALS
SYSTOLIC BLOOD PRESSURE: 126 MMHG | TEMPERATURE: 98 F | WEIGHT: 186.94 LBS | HEART RATE: 76 BPM | DIASTOLIC BLOOD PRESSURE: 80 MMHG | BODY MASS INDEX: 29.34 KG/M2 | HEIGHT: 67 IN | OXYGEN SATURATION: 97 %

## 2023-03-08 DIAGNOSIS — N18.32 STAGE 3B CHRONIC KIDNEY DISEASE: ICD-10-CM

## 2023-03-08 DIAGNOSIS — Z12.5 PROSTATE CANCER SCREENING: ICD-10-CM

## 2023-03-08 DIAGNOSIS — I10 ESSENTIAL HYPERTENSION: Primary | ICD-10-CM

## 2023-03-08 DIAGNOSIS — E78.2 MIXED HYPERLIPIDEMIA: ICD-10-CM

## 2023-03-08 DIAGNOSIS — N40.0 BENIGN PROSTATIC HYPERPLASIA, UNSPECIFIED WHETHER LOWER URINARY TRACT SYMPTOMS PRESENT: ICD-10-CM

## 2023-03-08 DIAGNOSIS — R79.9 ABNORMAL FINDING OF BLOOD CHEMISTRY, UNSPECIFIED: ICD-10-CM

## 2023-03-08 DIAGNOSIS — R53.83 FATIGUE, UNSPECIFIED TYPE: ICD-10-CM

## 2023-03-08 PROCEDURE — 99214 OFFICE O/P EST MOD 30 MIN: CPT | Mod: ,,, | Performed by: FAMILY MEDICINE

## 2023-03-08 PROCEDURE — 99214 PR OFFICE/OUTPT VISIT, EST, LEVL IV, 30-39 MIN: ICD-10-PCS | Mod: ,,, | Performed by: FAMILY MEDICINE

## 2023-03-08 NOTE — PROGRESS NOTES
Subjective:       Patient ID: Vel Banda is a 89 y.o. male.    Chief Complaint: Establish Care (Pt here to est.care HTN/See lab) and Hypertension    Mr Debo is here for HTN follow up. His last full labs were in September 2022. He did have a a cbc done 2 days ago for unknown reasons. He did have some renal insufficiency a year ago, so we will recheck a CMP. His HTN is well controlled. He's got anemia, hyperlipidemia, stage 3 renal disease and BPH. He's not sure if he needs labs    Hypertension  Pertinent negatives include no chest pain or shortness of breath.   Review of Systems   Constitutional:  Positive for activity change. Negative for fatigue and fever.        Due to weak legs.   HENT:  Positive for postnasal drip and rhinorrhea.    Eyes:         Takes eye drops for unknown reason   Respiratory:  Negative for shortness of breath and wheezing.    Cardiovascular:  Negative for chest pain.   Gastrointestinal:  Positive for constipation. Negative for diarrhea.   Genitourinary:  Positive for difficulty urinating.        Ho BPH   Musculoskeletal:         Leg weakness   Allergic/Immunologic: Positive for environmental allergies.   Neurological:  Positive for dizziness.   Psychiatric/Behavioral:  Negative for agitation. The patient is not nervous/anxious.      Patient Active Problem List   Diagnosis    Benign prostatic hyperplasia    Essential hypertension    Fibromyositis    Chronic gouty arthropathy    Mixed hyperlipidemia    Osteoarthritis of knee    Stage 3 chronic kidney disease    Actinic keratosis    Iron deficiency anemia due to chronic blood loss       Objective:      Physical Exam  Constitutional:       Appearance: Normal appearance. He is normal weight.   HENT:      Head: Normocephalic and atraumatic.      Nose: Nose normal.      Mouth/Throat:      Mouth: Mucous membranes are moist.   Eyes:      Pupils: Pupils are equal, round, and reactive to light.   Cardiovascular:      Rate and Rhythm: Normal  "rate and regular rhythm.   Pulmonary:      Effort: Pulmonary effort is normal.      Breath sounds: Normal breath sounds.   Skin:     General: Skin is warm and dry.   Neurological:      General: No focal deficit present.      Mental Status: He is alert and oriented to person, place, and time.   Psychiatric:         Mood and Affect: Mood normal.         Behavior: Behavior normal.         Thought Content: Thought content normal.       Lab Results   Component Value Date    WBC 7.78 03/06/2023    HGB 14.6 03/06/2023    HCT 42.3 03/06/2023     03/06/2023    CHOL 142 12/09/2021    TRIG 72 12/09/2021    HDL 38 (L) 12/09/2021    ALT 18 09/29/2022    AST 22 09/29/2022     09/29/2022    K 4.9 09/29/2022     09/29/2022    CREATININE 1.4 09/29/2022    BUN 24 09/29/2022    CO2 24 09/29/2022     The ASCVD Risk score (Susan DK, et al., 2019) failed to calculate for the following reasons:    The 2019 ASCVD risk score is only valid for ages 40 to 79  Visit Vitals  /80 (BP Location: Right arm, Patient Position: Sitting, BP Method: Large (Manual))   Pulse 76   Temp 98.3 °F (36.8 °C)   Ht 5' 7" (1.702 m)   Wt 84.8 kg (186 lb 15.2 oz)   SpO2 97%   BMI 29.28 kg/m²      Assessment:       1. Essential hypertension    2. Mixed hyperlipidemia    3. Stage 3b chronic kidney disease    4. Prostate cancer screening    5. Fatigue, unspecified type    6. Abnormal finding of blood chemistry, unspecified    7. Benign prostatic hyperplasia, unspecified whether lower urinary tract symptoms present        Plan:       1. Essential hypertension  Assessment & Plan:  Stable, controlled on lisinopril      2. Mixed hyperlipidemia  Assessment & Plan:  stable    Orders:  -     Lipid Panel; Future; Expected date: 03/08/2023    3. Stage 3b chronic kidney disease  Assessment & Plan:  Improved at last visit, will repeat labs today    Orders:  -     Comprehensive Metabolic Panel; Future; Expected date: 03/08/2023  -     Hemoglobin A1C; " Future; Expected date: 03/08/2023  -     Microalbumin/Creatinine Ratio, Urine; Future; Expected date: 03/08/2023    4. Prostate cancer screening  -     PSA, Screening; Future; Expected date: 03/08/2023    5. Fatigue, unspecified type  -     TSH; Future; Expected date: 03/08/2023    6. Abnormal finding of blood chemistry, unspecified  -     Hemoglobin A1C; Future; Expected date: 03/08/2023    7. Benign prostatic hyperplasia, unspecified whether lower urinary tract symptoms present  Assessment & Plan:  Stable, continue flomax         Follow up in about 6 months (around 9/8/2023), or if symptoms worsen or fail to improve.      Future Appointments       Date Provider Specialty Appt Notes    9/8/2023 Anne Beebe MD Family Medicine 6 month ck up htn

## 2023-03-10 ENCOUNTER — LAB VISIT (OUTPATIENT)
Dept: LAB | Facility: CLINIC | Age: 88
End: 2023-03-10
Payer: MEDICARE

## 2023-03-10 DIAGNOSIS — E78.2 MIXED HYPERLIPIDEMIA: ICD-10-CM

## 2023-03-10 DIAGNOSIS — R79.9 ABNORMAL FINDING OF BLOOD CHEMISTRY, UNSPECIFIED: ICD-10-CM

## 2023-03-10 DIAGNOSIS — Z12.5 PROSTATE CANCER SCREENING: ICD-10-CM

## 2023-03-10 DIAGNOSIS — N18.32 STAGE 3B CHRONIC KIDNEY DISEASE: ICD-10-CM

## 2023-03-10 DIAGNOSIS — R53.83 FATIGUE, UNSPECIFIED TYPE: ICD-10-CM

## 2023-03-10 LAB
ALBUMIN SERPL BCP-MCNC: 3.8 G/DL (ref 3.5–5.2)
ALP SERPL-CCNC: 82 U/L (ref 55–135)
ALT SERPL W/O P-5'-P-CCNC: 12 U/L (ref 10–44)
ANION GAP SERPL CALC-SCNC: 10 MMOL/L (ref 8–16)
AST SERPL-CCNC: 21 U/L (ref 10–40)
BILIRUB SERPL-MCNC: 0.9 MG/DL (ref 0.1–1)
BUN SERPL-MCNC: 28 MG/DL (ref 10–30)
CALCIUM SERPL-MCNC: 9 MG/DL (ref 8.7–10.5)
CHLORIDE SERPL-SCNC: 108 MMOL/L (ref 95–110)
CHOLEST SERPL-MCNC: 173 MG/DL (ref 120–199)
CHOLEST/HDLC SERPL: 4.9 {RATIO} (ref 2–5)
CO2 SERPL-SCNC: 22 MMOL/L (ref 23–29)
COMPLEXED PSA SERPL-MCNC: 13.2 NG/ML (ref 0–4)
CREAT SERPL-MCNC: 1.5 MG/DL (ref 0.5–1.4)
EST. GFR  (NO RACE VARIABLE): 43 ML/MIN/1.73 M^2
GLUCOSE SERPL-MCNC: 92 MG/DL (ref 70–110)
HDLC SERPL-MCNC: 35 MG/DL (ref 40–75)
HDLC SERPL: 20.2 % (ref 20–50)
LDLC SERPL CALC-MCNC: 122.4 MG/DL (ref 63–159)
NONHDLC SERPL-MCNC: 138 MG/DL
POTASSIUM SERPL-SCNC: 4.6 MMOL/L (ref 3.5–5.1)
PROT SERPL-MCNC: 6.6 G/DL (ref 6–8.4)
SODIUM SERPL-SCNC: 140 MMOL/L (ref 136–145)
TRIGL SERPL-MCNC: 78 MG/DL (ref 30–150)
TSH SERPL DL<=0.005 MIU/L-ACNC: 1.52 UIU/ML (ref 0.4–4)

## 2023-03-10 PROCEDURE — 82570 ASSAY OF URINE CREATININE: CPT | Performed by: FAMILY MEDICINE

## 2023-03-10 PROCEDURE — 80053 COMPREHEN METABOLIC PANEL: CPT | Performed by: FAMILY MEDICINE

## 2023-03-10 PROCEDURE — 80061 LIPID PANEL: CPT | Performed by: FAMILY MEDICINE

## 2023-03-10 PROCEDURE — 84153 ASSAY OF PSA TOTAL: CPT | Performed by: FAMILY MEDICINE

## 2023-03-10 PROCEDURE — 83036 HEMOGLOBIN GLYCOSYLATED A1C: CPT | Performed by: FAMILY MEDICINE

## 2023-03-10 PROCEDURE — 84443 ASSAY THYROID STIM HORMONE: CPT | Performed by: FAMILY MEDICINE

## 2023-03-11 LAB
ALBUMIN/CREAT UR: 27.3 UG/MG (ref 0–30)
CREAT UR-MCNC: 110 MG/DL (ref 23–375)
ESTIMATED AVG GLUCOSE: 82 MG/DL (ref 68–131)
HBA1C MFR BLD: 4.5 % (ref 4–5.6)
MICROALBUMIN UR DL<=1MG/L-MCNC: 30 UG/ML

## 2023-04-06 ENCOUNTER — OFFICE VISIT (OUTPATIENT)
Dept: FAMILY MEDICINE | Facility: CLINIC | Age: 88
End: 2023-04-06
Payer: MEDICARE

## 2023-04-06 VITALS
DIASTOLIC BLOOD PRESSURE: 80 MMHG | OXYGEN SATURATION: 98 % | WEIGHT: 186.75 LBS | TEMPERATURE: 98 F | BODY MASS INDEX: 29.31 KG/M2 | HEART RATE: 70 BPM | HEIGHT: 67 IN | SYSTOLIC BLOOD PRESSURE: 112 MMHG

## 2023-04-06 DIAGNOSIS — R97.20 ELEVATED PSA, BETWEEN 10 AND LESS THAN 20 NG/ML: ICD-10-CM

## 2023-04-06 PROCEDURE — 99213 PR OFFICE/OUTPT VISIT, EST, LEVL III, 20-29 MIN: ICD-10-PCS | Mod: ,,, | Performed by: FAMILY MEDICINE

## 2023-04-06 PROCEDURE — 99213 OFFICE O/P EST LOW 20 MIN: CPT | Mod: ,,, | Performed by: FAMILY MEDICINE

## 2023-04-06 NOTE — PROGRESS NOTES
Subjective:       Patient ID: Vel Banda is a 89 y.o. male.    Chief Complaint: Follow-up (1 month f/u labs)    Mr Edmondson is here to go over labs. He has an elevated PSA of 13.2. He has renal insufficiency. BUN 28, Cr.1.5, GFR 43. We discussed all options and Mr Edmondson does not want any treatment.    Follow-up  Associated symptoms include arthralgias. Pertinent negatives include no fatigue.   Review of Systems   Constitutional:  Negative for fatigue.   HENT:  Negative for sinus pain and sneezing.    Gastrointestinal:  Negative for constipation and diarrhea.   Genitourinary:  Negative for frequency.   Musculoskeletal:  Positive for arthralgias.        Bilateral lower extremities with pain on exertion (calcification of arteries seen on Xrays).    Psychiatric/Behavioral:  The patient is not nervous/anxious.      Patient Active Problem List   Diagnosis    Benign prostatic hyperplasia    Essential hypertension    Fibromyositis    Chronic gouty arthropathy    Mixed hyperlipidemia    Osteoarthritis of knee    Stage 3 chronic kidney disease    Actinic keratosis    Iron deficiency anemia due to chronic blood loss    Elevated PSA, between 10 and less than 20 ng/ml       Objective:      Physical Exam  Vitals and nursing note reviewed.   Constitutional:       Appearance: Normal appearance. He is normal weight.   HENT:      Head: Normocephalic.      Nose: Nose normal.   Eyes:      Extraocular Movements: Extraocular movements intact.      Conjunctiva/sclera: Conjunctivae normal.      Pupils: Pupils are equal, round, and reactive to light.   Cardiovascular:      Rate and Rhythm: Normal rate and regular rhythm.      Heart sounds: Normal heart sounds. No murmur heard.  Pulmonary:      Effort: Pulmonary effort is normal. No respiratory distress.      Breath sounds: Normal breath sounds.   Musculoskeletal:         General: Normal range of motion.      Cervical back: Normal range of motion and neck supple.   Skin:      "General: Skin is warm and dry.   Neurological:      General: No focal deficit present.      Mental Status: He is alert and oriented to person, place, and time.   Psychiatric:         Mood and Affect: Mood normal.         Behavior: Behavior normal.       Lab Results   Component Value Date    WBC 7.78 03/06/2023    HGB 14.6 03/06/2023    HCT 42.3 03/06/2023     03/06/2023    CHOL 173 03/10/2023    TRIG 78 03/10/2023    HDL 35 (L) 03/10/2023    ALT 12 03/10/2023    AST 21 03/10/2023     03/10/2023    K 4.6 03/10/2023     03/10/2023    CREATININE 1.5 (H) 03/10/2023    BUN 28 03/10/2023    CO2 22 (L) 03/10/2023    TSH 1.525 03/10/2023    PSA 13.2 (H) 03/10/2023    HGBA1C 4.5 03/10/2023     The ASCVD Risk score (Susan DK, et al., 2019) failed to calculate for the following reasons:    The 2019 ASCVD risk score is only valid for ages 40 to 79  Visit Vitals  /80 (BP Location: Right arm, Patient Position: Sitting, BP Method: Medium (Manual))   Pulse 70   Temp 98.2 °F (36.8 °C)   Ht 5' 7" (1.702 m)   Wt 84.7 kg (186 lb 11.7 oz)   SpO2 98%   BMI 29.25 kg/m²      Assessment:       1. Elevated PSA, between 10 and less than 20 ng/ml        Plan:       1. Elevated PSA, between 10 and less than 20 ng/ml  Assessment & Plan:  No treatment is desired         Follow up if symptoms worsen or fail to improve.      Future Appointments       Date Provider Specialty Appt Notes    9/8/2023 Anne Beebe MD Family Medicine 6 month ck up htn             "

## 2023-08-10 ENCOUNTER — TELEPHONE (OUTPATIENT)
Dept: FAMILY MEDICINE | Facility: CLINIC | Age: 88
End: 2023-08-10

## 2023-08-10 ENCOUNTER — OFFICE VISIT (OUTPATIENT)
Dept: FAMILY MEDICINE | Facility: CLINIC | Age: 88
End: 2023-08-10
Payer: MEDICARE

## 2023-08-10 VITALS
HEART RATE: 64 BPM | HEIGHT: 67 IN | OXYGEN SATURATION: 100 % | RESPIRATION RATE: 18 BRPM | BODY MASS INDEX: 29.31 KG/M2 | WEIGHT: 186.75 LBS | SYSTOLIC BLOOD PRESSURE: 134 MMHG | DIASTOLIC BLOOD PRESSURE: 72 MMHG

## 2023-08-10 DIAGNOSIS — R42 DIZZINESS: ICD-10-CM

## 2023-08-10 DIAGNOSIS — R04.0 NOSEBLEED: ICD-10-CM

## 2023-08-10 DIAGNOSIS — R35.0 FREQUENCY OF URINATION: ICD-10-CM

## 2023-08-10 DIAGNOSIS — J34.89 FRONTAL SINUS PAIN: Primary | ICD-10-CM

## 2023-08-10 DIAGNOSIS — I10 ESSENTIAL HYPERTENSION: ICD-10-CM

## 2023-08-10 LAB
BILIRUBIN, UA POC OHS: NEGATIVE
BLOOD, UA POC OHS: NEGATIVE
CLARITY, UA POC OHS: CLEAR
COLOR, UA POC OHS: YELLOW
GLUCOSE, UA POC OHS: NEGATIVE
KETONES, UA POC OHS: NEGATIVE
LEUKOCYTES, UA POC OHS: NEGATIVE
NITRITE, UA POC OHS: NEGATIVE
PH, UA POC OHS: 5.5
PROTEIN, UA POC OHS: ABNORMAL
SPECIFIC GRAVITY, UA POC OHS: 1.02
UROBILINOGEN, UA POC OHS: 0.2

## 2023-08-10 PROCEDURE — 99214 PR OFFICE/OUTPT VISIT, EST, LEVL IV, 30-39 MIN: ICD-10-PCS | Mod: ,,, | Performed by: FAMILY MEDICINE

## 2023-08-10 PROCEDURE — 99214 OFFICE O/P EST MOD 30 MIN: CPT | Mod: ,,, | Performed by: FAMILY MEDICINE

## 2023-08-10 PROCEDURE — 81003 URINALYSIS AUTO W/O SCOPE: CPT | Mod: QW,RHCUB | Performed by: FAMILY MEDICINE

## 2023-08-10 NOTE — TELEPHONE ENCOUNTER
Viridiana informed phone desk staff while patient was on the line that xray will be closed at this location until Monday.

## 2023-08-10 NOTE — ASSESSMENT & PLAN NOTE
Resolved upon presentation today and no recurrent since the initial nosebleed.  If nosebleed continues to recur we can refer to ENT

## 2023-08-10 NOTE — PROGRESS NOTES
Subjective:       Patient ID: Vel Banda is a 89 y.o. male.    Chief Complaint: Hypertension, Epistaxis, and Dizziness    Mr. Edmondson is a 89-year-old male with a history of BPH, hypertension, fibromyositis, gout, hyperlipidemia, stage 3 chronic kidney disease and elevated PSA.  He is had a recent history of a slightly dizzy spell and a nosebleed that lasted no more than an hour.  He does not have nasal congestion he does not complain of postnasal drainage he does complain of frontal sinus pressure.  His blood pressure is slightly 140/82, and we will repeat that prior to his discharge today.  Due to the dizziness I will go ahead and obtain a urinalysis to ensure he is got no urinary tract infections and 2nd lead to increased episodes of dizziness in the elderly, as well as headache.  Mr. Edmondson states that he only had the 1 nosebleed that has happened in the past , however infrequently.    Hypertension    Epistaxis       Review of Systems   HENT:  Positive for nosebleeds.    Neurological:  Positive for dizziness.        States a pressure in the frontal sinuses it has not necessarily a pain, he just knows it is there       Patient Active Problem List   Diagnosis    Benign prostatic hyperplasia    Essential hypertension    Fibromyositis    Chronic gouty arthropathy    Mixed hyperlipidemia    Osteoarthritis of knee    Stage 3 chronic kidney disease    Actinic keratosis    Iron deficiency anemia due to chronic blood loss    Elevated PSA, between 10 and less than 20 ng/ml    Nosebleed    Dizziness       Objective:      Physical Exam  Constitutional:       Appearance: Normal appearance.   HENT:      Head: Normocephalic and atraumatic.      Mouth/Throat:      Mouth: Mucous membranes are moist.   Eyes:      Extraocular Movements: Extraocular movements intact.      Pupils: Pupils are equal, round, and reactive to light.   Cardiovascular:      Rate and Rhythm: Normal rate and regular rhythm.   Pulmonary:       "Effort: Pulmonary effort is normal.      Breath sounds: Normal breath sounds.   Skin:     General: Skin is warm.   Neurological:      General: No focal deficit present.      Mental Status: He is alert and oriented to person, place, and time. Mental status is at baseline.   Psychiatric:         Mood and Affect: Mood normal.         Behavior: Behavior normal.         Lab Results   Component Value Date    WBC 7.78 03/06/2023    HGB 14.6 03/06/2023    HCT 42.3 03/06/2023     03/06/2023    CHOL 173 03/10/2023    TRIG 78 03/10/2023    HDL 35 (L) 03/10/2023    ALT 12 03/10/2023    AST 21 03/10/2023     03/10/2023    K 4.6 03/10/2023     03/10/2023    CREATININE 1.5 (H) 03/10/2023    BUN 28 03/10/2023    CO2 22 (L) 03/10/2023    TSH 1.525 03/10/2023    PSA 13.2 (H) 03/10/2023    HGBA1C 4.5 03/10/2023     The ASCVD Risk score (Susan SEALS, et al., 2019) failed to calculate for the following reasons:    The 2019 ASCVD risk score is only valid for ages 40 to 79  Visit Vitals  /72 (BP Location: Right arm, Patient Position: Sitting, BP Method: Medium (Manual))   Pulse 64   Resp 18   Ht 5' 7" (1.702 m)   Wt 84.7 kg (186 lb 11.7 oz)   SpO2 100%   BMI 29.25 kg/m²      Assessment:       1. Frontal sinus pain    2. Frequency of urination    3. Essential hypertension    4. Nosebleed    5. Dizziness        Plan:       1. Frontal sinus pain  -     X-Ray Sinuses Min 3 Views; Future; Expected date: 08/10/2023  -     Cancel: POCT Urinalysis(Instrument)    2. Frequency of urination  -     POCT Urinalysis(Instrument)    3. Essential hypertension  Assessment & Plan:  Stable blood pressure meds as prescribed      4. Nosebleed  Assessment & Plan:  Resolved upon presentation today and no recurrent since the initial nosebleed.  If nosebleed continues to recur we can refer to ENT      5. Dizziness  Assessment & Plan:  Essentially resolved, UA done to rule out UTI or dehydration         Follow up if symptoms worsen or fail to " improve.      Future Appointments       Date Provider Specialty Appt Notes    9/8/2023 Anne Beebe MD Family Medicine 6 month ck up htn

## 2023-08-10 NOTE — TELEPHONE ENCOUNTER
----- Message from Pearl Vaughn sent at 8/10/2023  3:18 PM CDT -----  Regarding: advise  Contact: patient  Type: Needs Medical Advice  Who Called:  Patient  Symptoms (please be specific):    How long has patient had these symptoms:    Pharmacy name and phone #:      Best Call Back Number: [phone]    Additional Information: Alexandre Kemp, I have Vel Grissom [id number] needing to know if the xray dept in [place] will be open tomorrow? are you available to advise?

## 2023-09-12 ENCOUNTER — OFFICE VISIT (OUTPATIENT)
Dept: FAMILY MEDICINE | Facility: CLINIC | Age: 88
End: 2023-09-12
Payer: MEDICARE

## 2023-09-12 VITALS
SYSTOLIC BLOOD PRESSURE: 128 MMHG | DIASTOLIC BLOOD PRESSURE: 66 MMHG | OXYGEN SATURATION: 97 % | TEMPERATURE: 99 F | HEART RATE: 75 BPM | WEIGHT: 187.38 LBS | BODY MASS INDEX: 29.41 KG/M2 | HEIGHT: 67 IN

## 2023-09-12 DIAGNOSIS — K59.00 CONSTIPATION, UNSPECIFIED CONSTIPATION TYPE: ICD-10-CM

## 2023-09-12 DIAGNOSIS — R79.9 ABNORMAL FINDING OF BLOOD CHEMISTRY, UNSPECIFIED: ICD-10-CM

## 2023-09-12 DIAGNOSIS — E78.2 MIXED HYPERLIPIDEMIA: ICD-10-CM

## 2023-09-12 DIAGNOSIS — N18.32 STAGE 3B CHRONIC KIDNEY DISEASE: ICD-10-CM

## 2023-09-12 DIAGNOSIS — I10 ESSENTIAL HYPERTENSION: Primary | ICD-10-CM

## 2023-09-12 DIAGNOSIS — R35.0 FREQUENCY OF URINATION: ICD-10-CM

## 2023-09-12 PROCEDURE — 99214 PR OFFICE/OUTPT VISIT, EST, LEVL IV, 30-39 MIN: ICD-10-PCS | Mod: ,,, | Performed by: FAMILY MEDICINE

## 2023-09-12 PROCEDURE — 99214 OFFICE O/P EST MOD 30 MIN: CPT | Mod: ,,, | Performed by: FAMILY MEDICINE

## 2023-09-12 RX ORDER — POLYETHYLENE GLYCOL 3350 17 G/17G
17 POWDER, FOR SOLUTION ORAL DAILY
Qty: 100 PACKET | Refills: 3 | Status: SHIPPED | OUTPATIENT
Start: 2023-09-12

## 2023-09-12 RX ORDER — CHOLESTYRAMINE 4 G/9G
1 POWDER, FOR SUSPENSION ORAL DAILY
Qty: 3 EACH | Refills: 3 | Status: SHIPPED | OUTPATIENT
Start: 2023-09-12

## 2023-09-12 NOTE — PROGRESS NOTES
Subjective:       Patient ID: Vel Banda is a 89 y.o. male.    Chief Complaint: Follow-up and Medication Refill    Vel Edmondson is a delightful 89-year-old male with a history of elevated PSA, iron deficiency anemia, chronic kidney disease, mixed hyperlipidemia, chronic gout, fibromyositis, essential hypertension and BPH who is here for his routine follow-up and medication refills.  He is also needing a handicap placard form filled out.  His last labs were done 03/06/2023 and have been reviewed.  The results are as follows:    1. CBC within acceptable limits   2. CMP remarkable for CO2 of 22, creatinine 1.5 and GFR of 43  3. Lipid panel total cholesterol 173, HDL 35, , triglycerides 78 and total/HDL ratio 4.9   4. Hemoglobin A1c 4.5%   5. TSH 1.5-5   6. PSA 13.2  7. Microalbumin creatinine ratio within normal limits     Mr. Edmondson has no specific complaints today he is due for labs and he does need medication refills.  He is wishing to change his pharmacy from [facility] to [facility] in [place]    Follow-up  Associated symptoms include arthralgias, myalgias and weakness. Pertinent negatives include no abdominal pain, chest pain, chills, congestion, coughing, diaphoresis, fever, nausea, rash or sore throat.   Medication Refill  Associated symptoms include arthralgias, myalgias and weakness. Pertinent negatives include no abdominal pain, chest pain, chills, congestion, coughing, diaphoresis, fever, nausea, rash or sore throat.     Review of Systems   Constitutional:  Negative for activity change, appetite change, chills, diaphoresis and fever.   HENT:  Negative for congestion, ear pain, postnasal drip, rhinorrhea and sore throat.    Eyes:  Negative for pain, discharge, redness and itching.   Respiratory:  Negative for cough and shortness of breath.    Cardiovascular:  Negative for chest pain, palpitations and leg swelling.   Gastrointestinal:  Negative for abdominal distention, abdominal pain,  "constipation, diarrhea and nausea.   Genitourinary:  Negative for difficulty urinating, dysuria, frequency and urgency.   Musculoskeletal:  Positive for arthralgias, back pain and myalgias.   Skin:  Negative for color change, rash and wound.   Neurological:  Positive for weakness.   All other systems reviewed and are negative.      Patient Active Problem List   Diagnosis    Benign prostatic hyperplasia    Essential hypertension    Fibromyositis    Chronic gouty arthropathy    Mixed hyperlipidemia    Osteoarthritis of knee    Stage 3 chronic kidney disease    Actinic keratosis    Iron deficiency anemia due to chronic blood loss    Elevated PSA, between 10 and less than 20 ng/ml    Nosebleed    Dizziness       Objective:      Physical Exam    Lab Results   Component Value Date    WBC 7.78 03/06/2023    HGB 14.6 03/06/2023    HCT 42.3 03/06/2023     03/06/2023    CHOL 173 03/10/2023    TRIG 78 03/10/2023    HDL 35 (L) 03/10/2023    ALT 12 03/10/2023    AST 21 03/10/2023     03/10/2023    K 4.6 03/10/2023     03/10/2023    CREATININE 1.5 (H) 03/10/2023    BUN 28 03/10/2023    CO2 22 (L) 03/10/2023    TSH 1.525 03/10/2023    PSA 13.2 (H) 03/10/2023    HGBA1C 4.5 03/10/2023     The ASCVD Risk score (Susan SEALS, et al., 2019) failed to calculate for the following reasons:    The 2019 ASCVD risk score is only valid for ages 40 to 79  Visit Vitals  /66 (BP Location: Left arm, Patient Position: Sitting, BP Method: Medium (Manual))   Pulse 75   Temp 98.6 °F (37 °C) (Oral)   Ht 5' 7" (1.702 m)   Wt 85 kg (187 lb 6.3 oz)   SpO2 97%   BMI 29.35 kg/m²      Assessment:       1. Essential hypertension    2. Stage 3b chronic kidney disease    3. Abnormal finding of blood chemistry, unspecified    4. Frequency of urination    5. Mixed hyperlipidemia    6. Constipation, unspecified constipation type        Plan:       1. Essential hypertension  -     CBC Auto Differential; Future; Expected date: 09/12/2023  -    "  Comprehensive Metabolic Panel; Future; Expected date: 09/12/2023  -     Protein/Creatinine Ratio, Urine; Future; Expected date: 09/12/2023    2. Stage 3b chronic kidney disease  -     CBC Auto Differential; Future; Expected date: 09/12/2023  -     Comprehensive Metabolic Panel; Future; Expected date: 09/12/2023  -     Protein/Creatinine Ratio, Urine; Future; Expected date: 09/12/2023    3. Abnormal finding of blood chemistry, unspecified  -     Hemoglobin A1C; Future; Expected date: 09/12/2023    4. Frequency of urination  -     Hemoglobin A1C; Future; Expected date: 09/12/2023    5. Mixed hyperlipidemia  -     cholestyramine, with sugar, 4 gram Powd; Take 1 Scoop by mouth once daily.  Dispense: 3 each; Refill: 3    6. Constipation, unspecified constipation type  -     polyethylene glycol (GLYCOLAX) 17 gram PwPk; Take 17 g by mouth once daily.  Dispense: 100 packet; Refill: 3       Follow up in about 6 months (around 3/12/2024), or if symptoms worsen or fail to improve.    Disabled parking application was completed and will be scanned to chart      Future Appointments       Date Provider Specialty Appt Notes    9/13/2023  Lab .    9/13/2023  Lab .    9/13/2023  Lab .    3/18/2024 Anne Beebe MD Family Medicine 6 month follow up

## 2023-09-13 ENCOUNTER — LAB VISIT (OUTPATIENT)
Dept: LAB | Facility: CLINIC | Age: 88
End: 2023-09-13
Payer: MEDICARE

## 2023-09-13 DIAGNOSIS — N18.32 STAGE 3B CHRONIC KIDNEY DISEASE: ICD-10-CM

## 2023-09-13 DIAGNOSIS — I10 ESSENTIAL HYPERTENSION: ICD-10-CM

## 2023-09-13 LAB
CREAT UR-MCNC: 113.9 MG/DL (ref 23–375)
PROT UR-MCNC: <7 MG/DL (ref 0–15)
PROT/CREAT UR: NORMAL MG/G{CREAT} (ref 0–0.2)

## 2023-09-13 PROCEDURE — 84156 ASSAY OF PROTEIN URINE: CPT | Performed by: FAMILY MEDICINE

## 2024-03-19 DIAGNOSIS — N40.0 BENIGN PROSTATIC HYPERPLASIA, UNSPECIFIED WHETHER LOWER URINARY TRACT SYMPTOMS PRESENT: ICD-10-CM

## 2024-03-19 DIAGNOSIS — M1A.00X0 CHRONIC GOUTY ARTHROPATHY: ICD-10-CM

## 2024-03-19 RX ORDER — ALLOPURINOL 300 MG/1
300 TABLET ORAL
Qty: 90 TABLET | Refills: 0 | Status: SHIPPED | OUTPATIENT
Start: 2024-03-19 | End: 2024-06-19

## 2024-03-19 RX ORDER — TAMSULOSIN HYDROCHLORIDE 0.4 MG/1
1 CAPSULE ORAL DAILY
Qty: 90 CAPSULE | Refills: 1 | Status: SHIPPED | OUTPATIENT
Start: 2024-03-19

## 2024-03-19 NOTE — TELEPHONE ENCOUNTER
Refill Routing Note   Medication(s) are not appropriate for processing by [facility] for the following reason(s):        Required labs outdated  Required labs abnormal    [facility] action(s):  Defer  Approve     Requires labs : Yes             Appointments  past 12m or future 3m with PCP    Date Provider   Last Visit   9/12/2023 Anne Beebe MD   Next Visit   Visit date not found Anne Beebe MD   ED visits in past 90 days: 0        Note composed:11:07 AM 03/19/2024

## 2024-03-19 NOTE — TELEPHONE ENCOUNTER
Care Due:                  Date            Visit Type   Department     Provider  --------------------------------------------------------------------------------                                EP -                              PRIMARY  Last Visit: 09-      CARE ([facility])   None Found     Anne Beebe  Next Visit: None Scheduled  None         None Found                                                            Last  Test          Frequency    Reason                     Performed    Due Date  --------------------------------------------------------------------------------    Lipid Panel.  12 months..  cholestyramine,..........  03-   03-    Uric Acid...  12 months..  allopurinoL..............  Not Found    Overdue    [employer] Embedded Care Due Messages. Reference number: [id number].   3/19/2024 12:19:48 AM CDT

## 2024-06-19 DIAGNOSIS — M1A.00X0 CHRONIC GOUTY ARTHROPATHY: ICD-10-CM

## 2024-06-19 RX ORDER — ALLOPURINOL 300 MG/1
300 TABLET ORAL
Qty: 90 TABLET | Refills: 0 | Status: SHIPPED | OUTPATIENT
Start: 2024-06-19

## 2024-06-19 NOTE — TELEPHONE ENCOUNTER
Refill Routing Note   Medication(s) are not appropriate for processing by [facility] for the following reason(s):        Required labs outdated    [facility] action(s):  Defer   Requires appointment : Yes     Requires labs : Yes             Appointments  past 12m or future 3m with PCP    Date Provider   Last Visit   9/12/2023 Anne Beebe MD   Next Visit   Visit date not found Anne Beebe MD   ED visits in past 90 days: 0        Note composed:4:09 AM 06/19/2024

## 2024-06-19 NOTE — TELEPHONE ENCOUNTER
Care Due:                  Date            Visit Type   Department     Provider  --------------------------------------------------------------------------------                                EP -                              PRIMARY  Last Visit: 09-      CARE ([facility])   None Found     Anne Beebe  Next Visit: None Scheduled  None         None Found                                                            Last  Test          Frequency    Reason                     Performed    Due Date  --------------------------------------------------------------------------------    Office Visit  12 months..  polyethylene.............  09- 09-    CBC.........  12 months..  allopurinoL..............  09- 09-    CMP.........  12 months..  allopurinoL,               09- 09-                             cholestyramine,,                             hydroCHLOROthiazide,                             lisinopriL...............    Lipid Panel.  12 months..  cholestyramine,..........  03-   03-    Uric Acid...  12 months..  allopurinoL..............  Not Found    Overdue    [employer] Embedded Care Due Messages. Reference number: [id number].   6/19/2024 12:19:50 AM CDT

## 2024-07-16 DIAGNOSIS — N40.0 BENIGN PROSTATIC HYPERPLASIA, UNSPECIFIED WHETHER LOWER URINARY TRACT SYMPTOMS PRESENT: ICD-10-CM

## 2024-07-16 DIAGNOSIS — I10 ESSENTIAL HYPERTENSION: ICD-10-CM

## 2024-07-16 RX ORDER — TAMSULOSIN HYDROCHLORIDE 0.4 MG/1
1 CAPSULE ORAL
Qty: 90 CAPSULE | Refills: 0 | Status: SHIPPED | OUTPATIENT
Start: 2024-07-16

## 2024-07-16 RX ORDER — HYDROCHLOROTHIAZIDE 12.5 MG/1
12.5 CAPSULE ORAL
Qty: 90 CAPSULE | Refills: 0 | Status: SHIPPED | OUTPATIENT
Start: 2024-07-16

## 2024-07-16 NOTE — TELEPHONE ENCOUNTER
No care due was identified.  [facility] Embedded Care Due Messages. Reference number: [id number].   7/16/2024 12:24:48 AM CDT

## 2024-07-16 NOTE — TELEPHONE ENCOUNTER
Refill Decision Note   Vel Banda  is requesting a refill authorization.  Brief Assessment and Rationale for Refill:  Approve     Medication Therapy Plan:         Comments:     Note composed:3:21 AM 07/16/2024

## 2024-07-25 DIAGNOSIS — I10 ESSENTIAL HYPERTENSION: ICD-10-CM

## 2024-07-25 DIAGNOSIS — M1A.00X0 CHRONIC GOUTY ARTHROPATHY: ICD-10-CM

## 2024-07-25 DIAGNOSIS — N40.0 BENIGN PROSTATIC HYPERPLASIA, UNSPECIFIED WHETHER LOWER URINARY TRACT SYMPTOMS PRESENT: ICD-10-CM

## 2024-07-25 RX ORDER — TAMSULOSIN HYDROCHLORIDE 0.4 MG/1
1 CAPSULE ORAL
Qty: 90 CAPSULE | Refills: 0 | Status: SHIPPED | OUTPATIENT
Start: 2024-07-25

## 2024-07-25 RX ORDER — ALLOPURINOL 300 MG/1
300 TABLET ORAL
Qty: 90 TABLET | Refills: 0 | Status: SHIPPED | OUTPATIENT
Start: 2024-07-25

## 2024-07-25 RX ORDER — HYDROCHLOROTHIAZIDE 12.5 MG/1
12.5 CAPSULE ORAL
Qty: 90 CAPSULE | Refills: 0 | Status: SHIPPED | OUTPATIENT
Start: 2024-07-25

## 2024-07-25 NOTE — TELEPHONE ENCOUNTER
No care due was identified.  [facility] Embedded Care Due Messages. Reference number: [id number].   7/25/2024 2:13:54 PM CDT

## 2024-07-25 NOTE — TELEPHONE ENCOUNTER
Refill Routing Note   Medication(s) are not appropriate for processing by [facility] for the following reason(s):        Required labs outdated: uric acid    [facility] action(s):  Defer  Approve      No dose adjustment recommended per renal fxn.          Appointments  past 12m or future 3m with PCP    Date Provider   Last Visit   9/12/2023 Anne Beebe MD   Next Visit   Visit date not found Anne Beebe MD   ED visits in past 90 days: 0        Note composed:4:42 PM 07/25/2024

## 2025-03-25 ENCOUNTER — HOSPITAL ENCOUNTER (INPATIENT)
Facility: HOSPITAL | Age: OVER 89
LOS: 4 days | Discharge: HOME HEALTH | DRG: 377 | End: 2025-03-29
Attending: EMERGENCY MEDICINE
Payer: MEDICARE

## 2025-03-25 DIAGNOSIS — I48.91 NEW ONSET ATRIAL FIBRILLATION: ICD-10-CM

## 2025-03-25 DIAGNOSIS — D64.9 SYMPTOMATIC ANEMIA: Primary | ICD-10-CM

## 2025-03-25 DIAGNOSIS — K92.2 GASTROINTESTINAL HEMORRHAGE, UNSPECIFIED GASTROINTESTINAL HEMORRHAGE TYPE: ICD-10-CM

## 2025-03-25 DIAGNOSIS — R07.9 CHEST PAIN: ICD-10-CM

## 2025-03-25 DIAGNOSIS — N17.9 ACUTE KIDNEY INJURY: ICD-10-CM

## 2025-03-25 DIAGNOSIS — R06.02 SHORTNESS OF BREATH: ICD-10-CM

## 2025-03-25 DIAGNOSIS — I50.9 CHF (CONGESTIVE HEART FAILURE): ICD-10-CM

## 2025-03-25 LAB
ABO + RH BLD: NORMAL
ABORH RETYPE: NORMAL
ABSOLUTE EOSINOPHIL (SMH): 0.03 K/UL
ABSOLUTE EOSINOPHIL (SMH): 0.06 K/UL
ABSOLUTE MONOCYTE (SMH): 0.74 K/UL (ref 0.3–1)
ABSOLUTE MONOCYTE (SMH): 0.76 K/UL (ref 0.3–1)
ABSOLUTE NEUTROPHIL COUNT (SMH): 7.1 K/UL (ref 1.8–7.7)
ABSOLUTE NEUTROPHIL COUNT (SMH): 7.4 K/UL (ref 1.8–7.7)
ALBUMIN SERPL-MCNC: 3.2 G/DL (ref 3.5–5.2)
ALP SERPL-CCNC: 86 UNIT/L (ref 40–150)
ALT SERPL-CCNC: 8 UNIT/L (ref 10–44)
ANION GAP (SMH): 8 MMOL/L (ref 8–16)
AST SERPL-CCNC: 23 UNIT/L (ref 11–45)
BASOPHILS # BLD AUTO: 0.03 K/UL
BASOPHILS # BLD AUTO: 0.05 K/UL
BASOPHILS NFR BLD AUTO: 0.3 %
BASOPHILS NFR BLD AUTO: 0.5 %
BILIRUB SERPL-MCNC: 0.4 MG/DL (ref 0.1–1)
BLD PROD TYP BPU: NORMAL
BLOOD UNIT EXPIRATION DATE: NORMAL
BLOOD UNIT TYPE CODE: 5100
BUN SERPL-MCNC: 57 MG/DL (ref 10–30)
CALCIUM SERPL-MCNC: 8.6 MG/DL (ref 8.7–10.5)
CHLORIDE SERPL-SCNC: 111 MMOL/L (ref 95–110)
CO2 SERPL-SCNC: 19 MMOL/L (ref 23–29)
CREAT SERPL-MCNC: 2.1 MG/DL (ref 0.5–1.4)
CROSSMATCH INTERPRETATION: NORMAL
D DIMER PPP IA.FEU-MCNC: 1.11 MG/L FEU
DISPENSE STATUS: NORMAL
ERYTHROCYTE [DISTWIDTH] IN BLOOD BY AUTOMATED COUNT: 17.6 % (ref 11.5–14.5)
ERYTHROCYTE [DISTWIDTH] IN BLOOD BY AUTOMATED COUNT: 17.7 % (ref 11.5–14.5)
FERRITIN SERPL-MCNC: 12.4 NG/ML (ref 20–300)
GFR SERPLBLD CREATININE-BSD FMLA CKD-EPI: 28 ML/MIN/1.73/M2
GLUCOSE SERPL-MCNC: 116 MG/DL (ref 70–110)
HCT VFR BLD AUTO: 18.8 % (ref 40–54)
HCT VFR BLD AUTO: 20.4 % (ref 40–54)
HCV AB SERPL QL IA: NORMAL
HGB BLD-MCNC: 5.6 GM/DL (ref 14–18)
HGB BLD-MCNC: 6 GM/DL (ref 14–18)
HIV 1+2 AB+HIV1 P24 AG SERPL QL IA: NORMAL
IMM GRANULOCYTES # BLD AUTO: 0.05 K/UL (ref 0–0.04)
IMM GRANULOCYTES # BLD AUTO: 0.05 K/UL (ref 0–0.04)
IMM GRANULOCYTES NFR BLD AUTO: 0.5 % (ref 0–0.5)
IMM GRANULOCYTES NFR BLD AUTO: 0.6 % (ref 0–0.5)
INDIRECT COOMBS: NORMAL
IRON SATN MFR SERPL: 3 % (ref 20–50)
IRON SERPL-MCNC: 11 UG/DL (ref 45–160)
LYMPHOCYTES # BLD AUTO: 0.63 K/UL (ref 1–4.8)
LYMPHOCYTES # BLD AUTO: 0.79 K/UL (ref 1–4.8)
MAGNESIUM SERPL-MCNC: 2.1 MG/DL (ref 1.6–2.6)
MCH RBC QN AUTO: 23.3 PG (ref 27–31)
MCH RBC QN AUTO: 23.6 PG (ref 27–31)
MCHC RBC AUTO-ENTMCNC: 29.4 G/DL (ref 32–36)
MCHC RBC AUTO-ENTMCNC: 29.8 G/DL (ref 32–36)
MCV RBC AUTO: 79 FL (ref 82–98)
MCV RBC AUTO: 79 FL (ref 82–98)
NT-PROBNP SERPL-MCNC: 2198 PG/ML
NUCLEATED RBC (/100WBC) (SMH): 1 /100 WBC
NUCLEATED RBC (/100WBC) (SMH): 1 /100 WBC
PLATELET # BLD AUTO: 280 K/UL (ref 150–450)
PLATELET # BLD AUTO: 354 K/UL (ref 150–450)
PMV BLD AUTO: 10.4 FL (ref 9.2–12.9)
PMV BLD AUTO: 9.8 FL (ref 9.2–12.9)
POTASSIUM SERPL-SCNC: 4.3 MMOL/L (ref 3.5–5.1)
PROT SERPL-MCNC: 6.2 GM/DL (ref 6–8.4)
RBC # BLD AUTO: 2.37 M/UL (ref 4.6–6.2)
RBC # BLD AUTO: 2.57 M/UL (ref 4.6–6.2)
RELATIVE EOSINOPHIL (SMH): 0.3 % (ref 0–8)
RELATIVE EOSINOPHIL (SMH): 0.7 % (ref 0–8)
RELATIVE LYMPHOCYTE (SMH): 7.3 % (ref 18–48)
RELATIVE LYMPHOCYTE (SMH): 8.7 % (ref 18–48)
RELATIVE MONOCYTE (SMH): 8.4 % (ref 4–15)
RELATIVE MONOCYTE (SMH): 8.6 % (ref 4–15)
RELATIVE NEUTROPHIL (SMH): 81.2 % (ref 38–73)
RELATIVE NEUTROPHIL (SMH): 82.9 % (ref 38–73)
RETICS/RBC NFR AUTO: 3.3 % (ref 0.4–2)
RH BLD: NORMAL
SODIUM SERPL-SCNC: 138 MMOL/L (ref 136–145)
SPECIMEN OUTDATE: NORMAL
TIBC SERPL-MCNC: 337 UG/DL (ref 250–450)
TRANSFERRIN SERPL-MCNC: 228 MG/DL (ref 200–375)
TROPONIN I SERPL HS-MCNC: 15 NG/L
TROPONIN I SERPL HS-MCNC: 16 NG/L
TSH SERPL-ACNC: 0.75 UIU/ML (ref 0.4–4)
UNIT NUMBER: NORMAL
WBC # BLD AUTO: 8.61 K/UL (ref 3.9–12.7)
WBC # BLD AUTO: 9.1 K/UL (ref 3.9–12.7)

## 2025-03-25 PROCEDURE — 87389 HIV-1 AG W/HIV-1&-2 AB AG IA: CPT | Performed by: EMERGENCY MEDICINE

## 2025-03-25 PROCEDURE — 99291 CRITICAL CARE FIRST HOUR: CPT

## 2025-03-25 PROCEDURE — 94760 N-INVAS EAR/PLS OXIMETRY 1: CPT

## 2025-03-25 PROCEDURE — 85045 AUTOMATED RETICULOCYTE COUNT: CPT | Performed by: EMERGENCY MEDICINE

## 2025-03-25 PROCEDURE — 21400001 HC TELEMETRY ROOM

## 2025-03-25 PROCEDURE — 93010 ELECTROCARDIOGRAM REPORT: CPT | Mod: ,,, | Performed by: GENERAL PRACTICE

## 2025-03-25 PROCEDURE — 36430 TRANSFUSION BLD/BLD COMPNT: CPT

## 2025-03-25 PROCEDURE — P9016 RBC LEUKOCYTES REDUCED: HCPCS | Performed by: EMERGENCY MEDICINE

## 2025-03-25 PROCEDURE — 30233P1 TRANSFUSION OF NONAUTOLOGOUS FROZEN RED CELLS INTO PERIPHERAL VEIN, PERCUTANEOUS APPROACH: ICD-10-PCS | Performed by: EMERGENCY MEDICINE

## 2025-03-25 PROCEDURE — 93005 ELECTROCARDIOGRAM TRACING: CPT

## 2025-03-25 PROCEDURE — 83735 ASSAY OF MAGNESIUM: CPT | Performed by: EMERGENCY MEDICINE

## 2025-03-25 PROCEDURE — 36415 COLL VENOUS BLD VENIPUNCTURE: CPT | Performed by: EMERGENCY MEDICINE

## 2025-03-25 PROCEDURE — 96374 THER/PROPH/DIAG INJ IV PUSH: CPT

## 2025-03-25 PROCEDURE — 94761 N-INVAS EAR/PLS OXIMETRY MLT: CPT

## 2025-03-25 PROCEDURE — 63600175 PHARM REV CODE 636 W HCPCS: Performed by: EMERGENCY MEDICINE

## 2025-03-25 PROCEDURE — 85379 FIBRIN DEGRADATION QUANT: CPT | Performed by: EMERGENCY MEDICINE

## 2025-03-25 PROCEDURE — 83540 ASSAY OF IRON: CPT | Performed by: EMERGENCY MEDICINE

## 2025-03-25 PROCEDURE — 83880 ASSAY OF NATRIURETIC PEPTIDE: CPT | Performed by: EMERGENCY MEDICINE

## 2025-03-25 PROCEDURE — 84484 ASSAY OF TROPONIN QUANT: CPT | Performed by: EMERGENCY MEDICINE

## 2025-03-25 PROCEDURE — 86803 HEPATITIS C AB TEST: CPT | Performed by: EMERGENCY MEDICINE

## 2025-03-25 PROCEDURE — 85025 COMPLETE CBC W/AUTO DIFF WBC: CPT | Performed by: EMERGENCY MEDICINE

## 2025-03-25 PROCEDURE — 12000002 HC ACUTE/MED SURGE SEMI-PRIVATE ROOM

## 2025-03-25 PROCEDURE — 86900 BLOOD TYPING SEROLOGIC ABO: CPT | Performed by: EMERGENCY MEDICINE

## 2025-03-25 PROCEDURE — 82728 ASSAY OF FERRITIN: CPT | Performed by: EMERGENCY MEDICINE

## 2025-03-25 PROCEDURE — 80053 COMPREHEN METABOLIC PANEL: CPT | Performed by: EMERGENCY MEDICINE

## 2025-03-25 PROCEDURE — 86920 COMPATIBILITY TEST SPIN: CPT | Performed by: EMERGENCY MEDICINE

## 2025-03-25 PROCEDURE — 84443 ASSAY THYROID STIM HORMONE: CPT | Performed by: EMERGENCY MEDICINE

## 2025-03-25 RX ORDER — ACETAMINOPHEN 325 MG/1
650 TABLET ORAL EVERY 4 HOURS PRN
Status: DISCONTINUED | OUTPATIENT
Start: 2025-03-26 | End: 2025-03-29 | Stop reason: HOSPADM

## 2025-03-25 RX ORDER — FUROSEMIDE 10 MG/ML
20 INJECTION INTRAMUSCULAR; INTRAVENOUS
Status: COMPLETED | OUTPATIENT
Start: 2025-03-25 | End: 2025-03-25

## 2025-03-25 RX ORDER — IBUPROFEN 200 MG
24 TABLET ORAL
Status: DISCONTINUED | OUTPATIENT
Start: 2025-03-26 | End: 2025-03-29 | Stop reason: HOSPADM

## 2025-03-25 RX ORDER — ONDANSETRON HYDROCHLORIDE 2 MG/ML
4 INJECTION, SOLUTION INTRAVENOUS EVERY 8 HOURS PRN
Status: DISCONTINUED | OUTPATIENT
Start: 2025-03-26 | End: 2025-03-29 | Stop reason: HOSPADM

## 2025-03-25 RX ORDER — PANTOPRAZOLE SODIUM 40 MG/10ML
80 INJECTION, POWDER, LYOPHILIZED, FOR SOLUTION INTRAVENOUS
Status: COMPLETED | OUTPATIENT
Start: 2025-03-25 | End: 2025-03-25

## 2025-03-25 RX ORDER — HYDROCODONE BITARTRATE AND ACETAMINOPHEN 500; 5 MG/1; MG/1
TABLET ORAL
Status: DISCONTINUED | OUTPATIENT
Start: 2025-03-25 | End: 2025-03-26

## 2025-03-25 RX ORDER — NALOXONE HCL 0.4 MG/ML
0.02 VIAL (ML) INJECTION
Status: DISCONTINUED | OUTPATIENT
Start: 2025-03-26 | End: 2025-03-29 | Stop reason: HOSPADM

## 2025-03-25 RX ORDER — SODIUM CHLORIDE 0.9 % (FLUSH) 0.9 %
10 SYRINGE (ML) INJECTION EVERY 12 HOURS PRN
Status: DISCONTINUED | OUTPATIENT
Start: 2025-03-26 | End: 2025-03-29 | Stop reason: HOSPADM

## 2025-03-25 RX ORDER — IBUPROFEN 200 MG
16 TABLET ORAL
Status: DISCONTINUED | OUTPATIENT
Start: 2025-03-26 | End: 2025-03-29 | Stop reason: HOSPADM

## 2025-03-25 RX ORDER — GLUCAGON 1 MG
1 KIT INJECTION
Status: DISCONTINUED | OUTPATIENT
Start: 2025-03-26 | End: 2025-03-29 | Stop reason: HOSPADM

## 2025-03-25 RX ADMIN — FUROSEMIDE 20 MG: 10 INJECTION, SOLUTION INTRAMUSCULAR; INTRAVENOUS at 05:03

## 2025-03-25 RX ADMIN — PANTOPRAZOLE SODIUM 80 MG: 40 INJECTION, POWDER, FOR SOLUTION INTRAVENOUS at 05:03

## 2025-03-25 NOTE — ED PROVIDER NOTES
Encounter Date: 3/25/2025       History     Chief Complaint   Patient presents with    Shortness of Breath     89-year-old male with no significant cardiac or pulmonary problems.  Him in his family member reports 1 month history of increasing shortness of breath.  Symptoms now becoming severe over last week.  No orthopnea.  No chest pain, pleurisy hemoptysis.  No productive cough.  No similar symptoms in the past.  No GI bleeding.        Review of patient's allergies indicates:   Allergen Reactions    Pcn [penicillins]      Past Medical History:   Diagnosis Date    Actinic keratosis     Benign prostatic hyperplasia     Chronic kidney disease     Stage 3     Fibromyositis     Gouty arthropathy     Hyperlipidemia     Hypertension     Osteoarthritis of knee      Past Surgical History:   Procedure Laterality Date    APPENDECTOMY      cataractsx2  02/21/2011    COLONOSCOPY      COLONOSCOPY N/A 08/01/2022    Procedure: COLONOSCOPY;  Surgeon: Edgar May MD;  Location: [facility];  Service: Endoscopy;  Laterality: N/A;    ESOPHAGOGASTRODUODENOSCOPY N/A 08/01/2022    Procedure: EGD (ESOPHAGOGASTRODUODENOSCOPY);  Surgeon: Edgar May MD;  Location: [facility];  Service: Endoscopy;  Laterality: N/A;    ESOPHAGOGASTRODUODENOSCOPY N/A 9/12/2022    Procedure: EGD (ESOPHAGOGASTRODUODENOSCOPY);  Surgeon: Edgar May MD;  Location: [facility];  Service: Endoscopy;  Laterality: N/A;    UPPER GASTROINTESTINAL ENDOSCOPY       Family History   Problem Relation Name Age of Onset    Hypertension Mother      Coronary artery disease Mother      Colon cancer Neg Hx      Crohn's disease Neg Hx      Ulcerative colitis Neg Hx      Stomach cancer Neg Hx      Esophageal cancer Neg Hx       Social History[1]  Review of Systems   Constitutional:  Negative for chills and fever.   HENT:  Negative for sore throat.    Eyes:  Negative for photophobia and visual disturbance.   Respiratory:  Positive for shortness of breath.     Cardiovascular:  Negative for chest pain.   Gastrointestinal:  Negative for abdominal pain and vomiting.   Genitourinary:  Negative for dysuria.   Musculoskeletal:  Negative for joint swelling.   Skin:  Negative for rash.   Neurological:  Negative for weakness and headaches.   Psychiatric/Behavioral:  Negative for confusion.        Physical Exam     Initial Vitals [03/25/25 1518]   BP Pulse Resp Temp SpO2   (!) 113/54 103 20 98.1 °F (36.7 °C) 98 %      MAP       --         Physical Exam    Nursing note and vitals reviewed.  Constitutional: He is not diaphoretic. No distress.   HENT:   Head: Normocephalic and atraumatic.   Eyes: Conjunctivae are normal.   Neck:   Normal range of motion.  Cardiovascular:  Regular rhythm.           Pulmonary/Chest:   Tachypneic with good air movement.  Clear breath sounds   Abdominal: Abdomen is soft. There is no abdominal tenderness.   Musculoskeletal:         General: Normal range of motion.      Cervical back: Normal range of motion.      Comments: Trace bilateral pretibial edema     Neurological: He is alert. He has normal strength. No cranial nerve deficit or sensory deficit.   Skin: No rash noted.   Psychiatric: He has a normal mood and affect.         ED Course   Critical Care    Date/Time: 3/25/2025 3:00 PM    Performed by: José Nick MD  Authorized by: José Nick MD  Direct patient critical care time: 15 minutes  Additional history critical care time: 5 minutes  Ordering / reviewing critical care time: 5 minutes  Documentation critical care time: 5 minutes  Consulting other physicians critical care time: 5 minutes  Total critical care time (exclusive of procedural time) : 35 minutes        Labs Reviewed   COMPREHENSIVE METABOLIC PANEL - Abnormal       Result Value    Sodium 138      Potassium 4.3      Chloride 111 (*)     CO2 19 (*)     Glucose 116 (*)     BUN 57 (*)     Creatinine 2.1 (*)     Calcium 8.6 (*)     Protein Total 6.2      Albumin 3.2 (*)      Bilirubin Total 0.4      ALP 86      AST 23      ALT 8 (*)     Anion Gap 8      eGFR 28 (*)    CBC WITH DIFFERENTIAL - Abnormal    WBC 9.10      RBC 2.57 (*)     Hgb 6.0 (*)     Hct 20.4 (*)     MCV 79 (*)     MCH 23.3 (*)     MCHC 29.4 (*)     RDW 17.7 (*)     Platelet Count 354      MPV 10.4      Nucleated RBC 1 (*)     Neut % 81.2 (*)     Lymph % 8.7 (*)     Mono % 8.4      Eos % 0.7      Basophil % 0.5      Imm Grans % 0.5      Neut # 7.4      Lymph # 0.79 (*)     Mono # 0.76      Eos # 0.06      Baso # 0.05      Imm Grans # 0.05 (*)    D DIMER, QUANTITATIVE - Abnormal    D-Dimer 1.11 (*)    CBC WITH DIFFERENTIAL - Abnormal    WBC 8.61      RBC 2.37 (*)     Hgb 5.6 (*)     Hct 18.8 (*)     MCV 79 (*)     MCH 23.6 (*)     MCHC 29.8 (*)     RDW 17.6 (*)     Platelet Count 280      MPV 9.8      Nucleated RBC 1 (*)     Neut % 82.9 (*)     Lymph % 7.3 (*)     Mono % 8.6      Eos % 0.3      Basophil % 0.3      Imm Grans % 0.6 (*)     Neut # 7.1      Lymph # 0.63 (*)     Mono # 0.74      Eos # 0.03      Baso # 0.03      Imm Grans # 0.05 (*)    RETICULOCYTES - Abnormal    Retic Count, Automated 3.3 (*)    MAGNESIUM - Normal    Magnesium 2.1     TROPONIN I HIGH SENSITIVITY - Normal    Troponin High Sensitive 16     HEPATITIS C ANTIBODY - Normal    Hep C Ab Interp Non-Reactive     HIV 1 / 2 ANTIBODY - Normal    HIV 1/2 Ag/Ab Non-Reactive     TROPONIN I HIGH SENSITIVITY - Normal    Troponin High Sensitive 15     CBC W/ AUTO DIFFERENTIAL    Narrative:     The following orders were created for panel order CBC auto differential.  Procedure                               Abnormality         Status                     ---------                               -----------         ------                     CBC with Differential[[id number]]       Abnormal            Final result                 Please view results for these tests on the individual orders.   CBC W/ AUTO DIFFERENTIAL    Narrative:     The following orders were created  for panel order CBC auto differential.  Procedure                               Abnormality         Status                     ---------                               -----------         ------                     CBC with Differential[[id number]]       Abnormal            Final result                 Please view results for these tests on the individual orders.   NT-PRO NATRIURETIC PEPTIDE   TSH   FERRITIN   IRON AND TIBC   TYPE & SCREEN   ABORH RETYPE   PREPARE RBC SOFT          Imaging Results              X-Ray Chest AP Portable (Final result)  Result time 03/25/25 17:13:05      Final result by Ramona Kan MD (03/25/25 17:13:05)                   Impression:      Appearance suggesting CHF      Electronically signed by: Ramona Kan MD  Date:    03/25/2025  Time:    17:13               Narrative:    EXAMINATION:  XR CHEST AP PORTABLE    CLINICAL HISTORY:  Shortness of breath;    TECHNIQUE:  Single frontal view of the chest was performed.    COMPARISON:  None    FINDINGS:  Cardiac silhouette appears enlarged.  Prominent perihilar markings suggesting pulmonary vascular distention or mild perihilar infiltrate and CHF.  Small right pleural effusion and questionable very small left pleural effusion                                       Medications   0.9%  NaCl infusion (for blood administration) (has no administration in time range)   furosemide injection 20 mg (has no administration in time range)   pantoprazole injection 80 mg (80 mg Intravenous Given 3/25/25 1720)     Medical Decision Making  Patient presents with shortness breath.  Differential diagnosis includes symptomatic anemia, congestive heart failure, pneumonia, venous thrombotic emboli.  Here CBC with a hemoglobin of 6.0, CMP with creatinine 2.1.  Troponin normal.  Chest x-ray consistent with pulmonary edema.  EKG atrial fibrillation with controlled ventricular rate, left bundle-branch block.  Here patient does have hemoglobin 6.0.  History of  duodenal ulcer in the past.  On rectal exam he has brown stool that is occult blood positive.  Reticulocyte count and iron studies pending at time of dictation.  Will transfuse.  Given D-dimer 1.11, new onset atrial fibrillation feel patient could have pulmonary embolus.  Symptoms likely secondary symptomatic anemia.  Will arrange V/Q scan at [facility].  Dr. Price with GI aware of patient.  Protonix given.  Patient does have pulmonary edema on x-ray.  Will give Lasix.  Patient will need close monitoring during blood transfusion for possible further diuresis.    Amount and/or Complexity of Data Reviewed  Labs: ordered. Decision-making details documented in ED Course.  Radiology: ordered. Decision-making details documented in ED Course.  ECG/medicine tests:  Decision-making details documented in ED Course.    Risk  Prescription drug management.  Decision regarding hospitalization.                                      Clinical Impression:  Final diagnoses:  [R06.02] Shortness of breath  [K92.2] Gastrointestinal hemorrhage, unspecified gastrointestinal hemorrhage type (Primary)  [D64.9] Symptomatic anemia  [N17.9] Acute kidney injury  [I48.91] New onset atrial fibrillation          ED Disposition Condition    Admit Stable                  José Nick MD  25 [id number]         [1]   Social History  Tobacco Use    Smoking status: Former     Current packs/day: 0.00     Types: Cigarettes     Quit date: 1975     Years since quittin.2    Smokeless tobacco: Current     Types: Chew   Substance Use Topics    Alcohol use: Yes     Comment: occasionally    Drug use: Never        José Nick MD  25 [id number]

## 2025-03-26 ENCOUNTER — ANESTHESIA (OUTPATIENT)
Dept: SURGERY | Facility: HOSPITAL | Age: OVER 89
End: 2025-03-26
Payer: MEDICARE

## 2025-03-26 ENCOUNTER — CLINICAL SUPPORT (OUTPATIENT)
Dept: CARDIOLOGY | Facility: HOSPITAL | Age: OVER 89
End: 2025-03-26
Attending: EMERGENCY MEDICINE
Payer: MEDICARE

## 2025-03-26 ENCOUNTER — ANESTHESIA EVENT (OUTPATIENT)
Dept: SURGERY | Facility: HOSPITAL | Age: OVER 89
End: 2025-03-26
Payer: MEDICARE

## 2025-03-26 VITALS
DIASTOLIC BLOOD PRESSURE: 44 MMHG | SYSTOLIC BLOOD PRESSURE: 86 MMHG | RESPIRATION RATE: 16 BRPM | OXYGEN SATURATION: 99 % | HEART RATE: 94 BPM

## 2025-03-26 PROBLEM — D62 ABLA (ACUTE BLOOD LOSS ANEMIA): Status: ACTIVE | Noted: 2025-03-26

## 2025-03-26 PROBLEM — D64.9 SYMPTOMATIC ANEMIA: Status: ACTIVE | Noted: 2025-03-26

## 2025-03-26 PROBLEM — R06.09 DOE (DYSPNEA ON EXERTION): Status: ACTIVE | Noted: 2025-03-26

## 2025-03-26 PROBLEM — M10.9 GOUT: Status: ACTIVE | Noted: 2025-03-26

## 2025-03-26 PROBLEM — I48.91 NEW ONSET ATRIAL FIBRILLATION: Status: ACTIVE | Noted: 2025-03-26

## 2025-03-26 PROBLEM — K92.2 GI BLEED: Status: RESOLVED | Noted: 2025-03-26 | Resolved: 2025-03-26

## 2025-03-26 PROBLEM — R79.89 ELEVATED D-DIMER: Status: ACTIVE | Noted: 2025-03-26

## 2025-03-26 PROBLEM — K92.2 GI BLEED: Status: ACTIVE | Noted: 2025-03-26

## 2025-03-26 LAB
ABO + RH BLD: NORMAL
ABSOLUTE EOSINOPHIL (SMH): 0.12 K/UL
ABSOLUTE MONOCYTE (SMH): 1.07 K/UL (ref 0.3–1)
ABSOLUTE NEUTROPHIL COUNT (SMH): 8.3 K/UL (ref 1.8–7.7)
ALBUMIN SERPL-MCNC: 3.4 G/DL (ref 3.5–5.2)
ALP SERPL-CCNC: 74 UNIT/L (ref 55–135)
ALT SERPL-CCNC: 7 UNIT/L (ref 10–44)
ANION GAP (SMH): 11 MMOL/L (ref 8–16)
AST SERPL-CCNC: 13 UNIT/L (ref 10–40)
BASOPHILS # BLD AUTO: 0.04 K/UL
BASOPHILS NFR BLD AUTO: 0.4 %
BILIRUB SERPL-MCNC: 1 MG/DL (ref 0.1–1)
BLD GP AB SCN CELLS X3 SERPL QL: NORMAL
BLD PROD TYP BPU: NORMAL
BLOOD UNIT EXPIRATION DATE: NORMAL
BLOOD UNIT TYPE CODE: 5100
BNP SERPL-MCNC: 657 PG/ML
BUN SERPL-MCNC: 62 MG/DL (ref 10–30)
CALCIUM SERPL-MCNC: 8.7 MG/DL (ref 8.7–10.5)
CHLORIDE SERPL-SCNC: 109 MMOL/L (ref 95–110)
CO2 SERPL-SCNC: 19 MMOL/L (ref 23–29)
CREAT SERPL-MCNC: 2.3 MG/DL (ref 0.5–1.4)
CROSSMATCH INTERPRETATION: NORMAL
DISPENSE STATUS: NORMAL
ERYTHROCYTE [DISTWIDTH] IN BLOOD BY AUTOMATED COUNT: 19.1 % (ref 11.5–14.5)
GFR SERPLBLD CREATININE-BSD FMLA CKD-EPI: 25 ML/MIN/1.73/M2
GLUCOSE SERPL-MCNC: 90 MG/DL (ref 70–110)
HCT VFR BLD AUTO: 22.4 % (ref 40–54)
HCT VFR BLD AUTO: 27.4 % (ref 40–54)
HGB BLD-MCNC: 6.9 GM/DL (ref 14–18)
HGB BLD-MCNC: 8.4 GM/DL (ref 14–18)
IMM GRANULOCYTES # BLD AUTO: 0.05 K/UL (ref 0–0.04)
IMM GRANULOCYTES NFR BLD AUTO: 0.5 % (ref 0–0.5)
LYMPHOCYTES # BLD AUTO: 0.93 K/UL (ref 1–4.8)
MAGNESIUM SERPL-MCNC: 2 MG/DL (ref 1.6–2.6)
MCH RBC QN AUTO: 24.8 PG (ref 27–31)
MCHC RBC AUTO-ENTMCNC: 30.7 G/DL (ref 32–36)
MCV RBC AUTO: 81 FL (ref 82–98)
NUCLEATED RBC (/100WBC) (SMH): 1 /100 WBC
OHS QRS DURATION: 160 MS
OHS QTC CALCULATION: 492 MS
PLATELET # BLD AUTO: 300 K/UL (ref 150–450)
PMV BLD AUTO: 10.1 FL (ref 9.2–12.9)
POTASSIUM SERPL-SCNC: 4.2 MMOL/L (ref 3.5–5.1)
PROT SERPL-MCNC: 6.1 GM/DL (ref 6–8.4)
RBC # BLD AUTO: 3.39 M/UL (ref 4.6–6.2)
RELATIVE EOSINOPHIL (SMH): 1.1 % (ref 0–8)
RELATIVE LYMPHOCYTE (SMH): 8.9 % (ref 18–48)
RELATIVE MONOCYTE (SMH): 10.2 % (ref 4–15)
RELATIVE NEUTROPHIL (SMH): 78.9 % (ref 38–73)
RH BLD: NORMAL
SODIUM SERPL-SCNC: 139 MMOL/L (ref 136–145)
SPECIMEN OUTDATE: NORMAL
UNIT NUMBER: NORMAL
WBC # BLD AUTO: 10.46 K/UL (ref 3.9–12.7)

## 2025-03-26 PROCEDURE — A9540 TC99M MAA: HCPCS | Performed by: HOSPITALIST

## 2025-03-26 PROCEDURE — 21400001 HC TELEMETRY ROOM

## 2025-03-26 PROCEDURE — 86901 BLOOD TYPING SEROLOGIC RH(D): CPT | Performed by: REGISTERED NURSE

## 2025-03-26 PROCEDURE — 25000003 PHARM REV CODE 250

## 2025-03-26 PROCEDURE — 99900031 HC PATIENT EDUCATION (STAT)

## 2025-03-26 PROCEDURE — 36415 COLL VENOUS BLD VENIPUNCTURE: CPT | Performed by: REGISTERED NURSE

## 2025-03-26 PROCEDURE — 27200043 HC FORCEPS, BIOPSY: Performed by: INTERNAL MEDICINE

## 2025-03-26 PROCEDURE — 12000002 HC ACUTE/MED SURGE SEMI-PRIVATE ROOM

## 2025-03-26 PROCEDURE — 85014 HEMATOCRIT: CPT | Performed by: REGISTERED NURSE

## 2025-03-26 PROCEDURE — 0DB98ZX EXCISION OF DUODENUM, VIA NATURAL OR ARTIFICIAL OPENING ENDOSCOPIC, DIAGNOSTIC: ICD-10-PCS | Performed by: INTERNAL MEDICINE

## 2025-03-26 PROCEDURE — 36430 TRANSFUSION BLD/BLD COMPNT: CPT

## 2025-03-26 PROCEDURE — 86920 COMPATIBILITY TEST SPIN: CPT | Performed by: REGISTERED NURSE

## 2025-03-26 PROCEDURE — 63600175 PHARM REV CODE 636 W HCPCS

## 2025-03-26 PROCEDURE — A9567 TECHNETIUM TC-99M AEROSOL: HCPCS | Performed by: HOSPITALIST

## 2025-03-26 PROCEDURE — 63600175 PHARM REV CODE 636 W HCPCS: Performed by: REGISTERED NURSE

## 2025-03-26 PROCEDURE — 37000009 HC ANESTHESIA EA ADD 15 MINS: Performed by: INTERNAL MEDICINE

## 2025-03-26 PROCEDURE — 0DB68ZX EXCISION OF STOMACH, VIA NATURAL OR ARTIFICIAL OPENING ENDOSCOPIC, DIAGNOSTIC: ICD-10-PCS | Performed by: INTERNAL MEDICINE

## 2025-03-26 PROCEDURE — 85018 HEMOGLOBIN: CPT | Performed by: REGISTERED NURSE

## 2025-03-26 PROCEDURE — 37000008 HC ANESTHESIA 1ST 15 MINUTES: Performed by: INTERNAL MEDICINE

## 2025-03-26 PROCEDURE — 80053 COMPREHEN METABOLIC PANEL: CPT | Performed by: REGISTERED NURSE

## 2025-03-26 PROCEDURE — 83880 ASSAY OF NATRIURETIC PEPTIDE: CPT | Performed by: REGISTERED NURSE

## 2025-03-26 PROCEDURE — 85025 COMPLETE CBC W/AUTO DIFF WBC: CPT | Performed by: REGISTERED NURSE

## 2025-03-26 PROCEDURE — 43239 EGD BIOPSY SINGLE/MULTIPLE: CPT | Performed by: INTERNAL MEDICINE

## 2025-03-26 PROCEDURE — 86850 RBC ANTIBODY SCREEN: CPT | Performed by: REGISTERED NURSE

## 2025-03-26 PROCEDURE — 99223 1ST HOSP IP/OBS HIGH 75: CPT | Mod: ,,, | Performed by: GENERAL PRACTICE

## 2025-03-26 PROCEDURE — 0DB58ZX EXCISION OF ESOPHAGUS, VIA NATURAL OR ARTIFICIAL OPENING ENDOSCOPIC, DIAGNOSTIC: ICD-10-PCS | Performed by: INTERNAL MEDICINE

## 2025-03-26 PROCEDURE — 88305 TISSUE EXAM BY PATHOLOGIST: CPT | Mod: TC,59 | Performed by: PATHOLOGY

## 2025-03-26 PROCEDURE — P9016 RBC LEUKOCYTES REDUCED: HCPCS | Performed by: REGISTERED NURSE

## 2025-03-26 PROCEDURE — 94761 N-INVAS EAR/PLS OXIMETRY MLT: CPT

## 2025-03-26 PROCEDURE — 83735 ASSAY OF MAGNESIUM: CPT | Performed by: REGISTERED NURSE

## 2025-03-26 RX ORDER — FUROSEMIDE 10 MG/ML
20 INJECTION INTRAMUSCULAR; INTRAVENOUS ONCE
Status: DISCONTINUED | OUTPATIENT
Start: 2025-03-26 | End: 2025-03-27

## 2025-03-26 RX ORDER — PANTOPRAZOLE SODIUM 40 MG/10ML
40 INJECTION, POWDER, LYOPHILIZED, FOR SOLUTION INTRAVENOUS 2 TIMES DAILY
Status: DISCONTINUED | OUTPATIENT
Start: 2025-03-26 | End: 2025-03-28

## 2025-03-26 RX ORDER — PROPOFOL 10 MG/ML
VIAL (ML) INTRAVENOUS
Status: DISCONTINUED | OUTPATIENT
Start: 2025-03-26 | End: 2025-03-26

## 2025-03-26 RX ORDER — ALLOPURINOL 300 MG/1
300 TABLET ORAL DAILY
Status: DISCONTINUED | OUTPATIENT
Start: 2025-03-26 | End: 2025-03-29 | Stop reason: HOSPADM

## 2025-03-26 RX ORDER — HYDROCODONE BITARTRATE AND ACETAMINOPHEN 500; 5 MG/1; MG/1
TABLET ORAL
Status: DISCONTINUED | OUTPATIENT
Start: 2025-03-26 | End: 2025-03-29 | Stop reason: HOSPADM

## 2025-03-26 RX ORDER — PHENYLEPHRINE HCL IN 0.9% NACL 1 MG/10 ML
SYRINGE (ML) INTRAVENOUS
Status: DISCONTINUED | OUTPATIENT
Start: 2025-03-26 | End: 2025-03-26

## 2025-03-26 RX ADMIN — KIT FOR THE PREPARATION OF TECHNETIUM TC 99M ALBUMIN AGGREGATED 6 MILLICURIE: 2 INJECTION, POWDER, LYOPHILIZED, FOR SUSPENSION INTRAPERITONEAL; INTRAVENOUS at 09:03

## 2025-03-26 RX ADMIN — Medication 200 MCG: at 01:03

## 2025-03-26 RX ADMIN — SODIUM CHLORIDE: 0.9 INJECTION, SOLUTION INTRAVENOUS at 01:03

## 2025-03-26 RX ADMIN — PANTOPRAZOLE SODIUM 40 MG: 40 INJECTION, POWDER, FOR SOLUTION INTRAVENOUS at 10:03

## 2025-03-26 RX ADMIN — PROPOFOL 50 MG: 10 INJECTION, EMULSION INTRAVENOUS at 01:03

## 2025-03-26 RX ADMIN — PROPOFOL 20 MG: 10 INJECTION, EMULSION INTRAVENOUS at 01:03

## 2025-03-26 RX ADMIN — Medication 400 MCG: at 01:03

## 2025-03-26 RX ADMIN — KIT FOR THE PREPARATION OF TECHNETIUM TC 99M PENTETATE 32 MILLICURIE: 20 INJECTION, POWDER, LYOPHILIZED, FOR SOLUTION INTRAVENOUS; RESPIRATORY (INHALATION) at 09:03

## 2025-03-26 RX ADMIN — PANTOPRAZOLE SODIUM 40 MG: 40 INJECTION, POWDER, FOR SOLUTION INTRAVENOUS at 09:03

## 2025-03-26 NOTE — ANESTHESIA POSTPROCEDURE EVALUATION
Anesthesia Post Evaluation    Patient: Vel Banda    Procedure(s) Performed: Procedure(s) (LRB):  EGD (ESOPHAGOGASTRODUODENOSCOPY) (N/A)    Final Anesthesia Type: general      Patient location during evaluation: GI PACU  Patient participation: Yes- Able to Participate  Level of consciousness: awake and alert  Post-procedure vital signs: reviewed and stable  Pain management: adequate  Airway patency: patent    PONV status at discharge: No PONV  Anesthetic complications: no      Cardiovascular status: blood pressure returned to baseline and stable  Respiratory status: unassisted and room air  Hydration status: euvolemic  Follow-up not needed.              Vitals Value Taken Time   /55 03/26/25 14:05   Temp 36.7 °C (98 °F) 03/26/25 14:05   Pulse 84 03/26/25 14:05   Resp 18 03/26/25 14:05   SpO2 96 % 03/26/25 14:05         No case tracking events are documented in the log.      Pain/William Score: No data recorded

## 2025-03-26 NOTE — ASSESSMENT & PLAN NOTE
Patient's hemorrhage is due to gastrointestinal bleed, this bleeding is not associated with a medication or a coagulopathy. Patients most recent Hgb, Hct, platelets, and INR are listed below.  Recent Labs     03/25/25  1632 03/26/25  0055 03/26/25  1016 03/27/25  0427   HGB 5.6* 6.9* 8.4* 7.8*   HCT 18.8* 22.4* 27.4* 24.9*     --  300 289     Plan  - Will trend hemoglobin/hematocrit Every 8 hours  - Will monitor and correct any coagulation defects  - Will transfuse if Hgb is <7g/dl (<8g/dl in cases of active ACS) or if patient has rapid bleeding leading to hemodynamic instability  - consult GI  -started on PPI b.i.d.- Gi recs : daily   -occult blood positive  -no antiplatelets, anticoagulants, NSAIDs  -completed EGD

## 2025-03-26 NOTE — ASSESSMENT & PLAN NOTE
Anemia is likely due to acute blood loss which was from GI bleed. Most recent hemoglobin and hematocrit are listed below.  Recent Labs     03/26/25  0055 03/26/25  1016 03/27/25  0427   HGB 6.9* 8.4* 7.8*   HCT 22.4* 27.4* 24.9*     Plan  - Monitor serial CBC: Every 8 hours  - Transfuse PRBC if patient becomes hemodynamically unstable, symptomatic or H/H drops below 7/21.  - Patient has received 1 units of PRBCs on 3/25/2025  - Patient's anemia is currently worsening. Will continue current treatment  - [facility]  -recheck H&H post transfusion on admit  -give additional PRBC if hemoglobin less than 7    Post transfusion increased hemoglobin 8.4, dropped again to 7.8

## 2025-03-26 NOTE — PLAN OF CARE
Discharge Assessment Attempted patient not in room.Will attempt later.   03/26/25 1312   Discharge Assessment   Assessment Type Discharge Planning Assessment

## 2025-03-26 NOTE — ASSESSMENT & PLAN NOTE
D-dimer 1.11 which could be contributed by acute GI bleed  Unable to CTA chest secondary to elevated creatinine    Low priority V/Q scan ordered for PE rule out

## 2025-03-26 NOTE — SUBJECTIVE & OBJECTIVE
Past Medical History:   Diagnosis Date    Actinic keratosis     Benign prostatic hyperplasia     Chronic kidney disease     Stage 3     Fibromyositis     Gouty arthropathy     Hyperlipidemia     Hypertension     Osteoarthritis of knee        Past Surgical History:   Procedure Laterality Date    APPENDECTOMY      cataractsx2  02/21/2011    COLONOSCOPY      COLONOSCOPY N/A 08/01/2022    Procedure: COLONOSCOPY;  Surgeon: Edgar May MD;  Location: [facility] ENDO;  Service: Endoscopy;  Laterality: N/A;    ESOPHAGOGASTRODUODENOSCOPY N/A 08/01/2022    Procedure: EGD (ESOPHAGOGASTRODUODENOSCOPY);  Surgeon: Edgar May MD;  Location: [facility] ENDO;  Service: Endoscopy;  Laterality: N/A;    ESOPHAGOGASTRODUODENOSCOPY N/A 9/12/2022    Procedure: EGD (ESOPHAGOGASTRODUODENOSCOPY);  Surgeon: Edgar May MD;  Location: [facility] ENDO;  Service: Endoscopy;  Laterality: N/A;    UPPER GASTROINTESTINAL ENDOSCOPY         Review of patient's allergies indicates:   Allergen Reactions    Pcn [penicillins]        No current facility-administered medications on file prior to encounter.     Current Outpatient Medications on File Prior to Encounter   Medication Sig    allopurinoL (ZYLOPRIM) 300 MG tablet TAKE 1 TABLET BY MOUTH EVERY DAY    aspirin 81 MG Chew Take 81 mg by mouth once daily.    cholestyramine, with sugar, 4 gram Powd Take 1 Scoop by mouth once daily.    hydroCHLOROthiazide (MICROZIDE) 12.5 mg capsule TAKE 1 CAPSULE BY MOUTH EVERY DAY    latanoprost 0.005 % ophthalmic solution 1 drop.    lisinopriL (PRINIVIL,ZESTRIL) 20 MG tablet Take 1 tablet (20 mg total) by mouth once daily.    polyethylene glycol (GLYCOLAX) 17 gram PwPk Take 17 g by mouth once daily.    tamsulosin (FLOMAX) 0.4 mg Cap TAKE 1 CAPSULE BY MOUTH EVERY DAY    travoprost, benzalkonium, (TRAVATAN) 0.004 % ophthalmic solution 1 drop every evening.     Family History       Problem Relation (Age of Onset)    Coronary artery disease Mother    Hypertension Mother           Tobacco Use    Smoking status: Former     Current packs/day: 0.00     Types: Cigarettes     Quit date:      Years since quittin.2    Smokeless tobacco: Current     Types: Chew   Substance and Sexual Activity    Alcohol use: Yes     Comment: occasionally    Drug use: Never    Sexual activity: Not Currently     Review of Systems   Constitutional:  Positive for fatigue.   Respiratory:  Positive for shortness of breath (Dyspnea on exertion).    Neurological:  Positive for dizziness (intermittently upon standing).   All other systems reviewed and are negative.    Objective:     Vital Signs (Most Recent):  Temp: 98.5 °F (36.9 °C) (25)  Pulse: 102 (25 2350)  Resp: 18 (25)  BP: 129/70 (25)  SpO2: 97 % (25) Vital Signs (24h Range):  Temp:  [98.1 °F (36.7 °C)-98.8 °F (37.1 °C)] 98.5 °F (36.9 °C)  Pulse:  [] 102  Resp:  [18-20] 18  SpO2:  [96 %-100 %] 97 %  BP: (108-148)/(53-81) 129/70     Weight: 81.6 kg (180 lb)  Body mass index is 29.05 kg/m².     Physical Exam  Vitals reviewed.   Constitutional:       Appearance: Normal appearance.   HENT:      Head: Normocephalic.      Nose: Nose normal.      Mouth/Throat:      Mouth: Mucous membranes are moist.      Pharynx: Oropharynx is clear.   Eyes:      Pupils: Pupils are equal, round, and reactive to light.   Cardiovascular:      Rate and Rhythm: Normal rate. Rhythm irregular.      Pulses: Normal pulses.      Heart sounds: Normal heart sounds.   Pulmonary:      Effort: Pulmonary effort is normal. Tachypnea present. No respiratory distress.      Breath sounds: Normal breath sounds and air entry.   Abdominal:      General: Bowel sounds are normal.      Palpations: Abdomen is soft.   Musculoskeletal:         General: Normal range of motion.      Cervical back: Normal range of motion.      Right lower leg: No edema.      Left lower leg: No edema.   Skin:     General: Skin is warm and dry.      Capillary Refill:  Capillary refill takes less than 2 seconds.   Neurological:      General: No focal deficit present.      Mental Status: He is alert and oriented to person, place, and time. Mental status is at baseline.   Psychiatric:         Mood and Affect: Mood normal.         Behavior: Behavior normal.              CRANIAL NERVES     CN III, IV, VI   Pupils are equal, round, and reactive to light.       Significant Labs: All pertinent labs within the past 24 hours have been reviewed.  Recent Lab Results         03/25/25  1632   03/25/25  1631   03/25/25  1605   03/25/25  1547        Unit Blood Type Code 5100  [P]             Unit Expiration [id number]  [P]             ABO and RH     O POS         Albumin       3.2       ALP       86       ALT       8       Anion Gap       8       AST       23       Baso # 0.03       0.05       Basophil % 0.3       0.5       BILIRUBIN TOTAL       0.4  Comment: For infants and newborns, interpretation of results should be based   on gestational age, weight and in agreement with clinical   observations.    Premature Infant recommended reference ranges:   0-24 hours:  <8.0 mg/dL   24-48 hours: <12.0 mg/dL   3-5 days:    <15.0 mg/dL   6-29 days:   <15.0 mg/dL       BUN       57       Calcium       8.6       Chloride       111       CO2       19       Creatinine       2.1       Crossmatch Interpretation Compatible  [P]             D-Dimer       1.11  Comment: The quantitative D-dimer assay should be used as an aid in the diagnosis of deep vein thrombosis and pulmonary embolism in patients with the appropriate presentation and clinical history. The upper limit of the reference interval and the clinical cut off point are identical. Causes of a positive (>0.50 mg/L FEU) D-Dimer test include, but are not limited to: DVT, PE, DIC, thrombolytic therapy, anticoagulant therapy, recent surgery, trauma, or pregnancy, disseminated malignancy, aortic aneurysm, cirrhosis, and severe infection. False negative  results may occur in patients with distal DVT.       DISPENSE STATUS Issued  [P]             eGFR       28       Eos # 0.03       0.06       Eos % 0.3       0.7       Ferritin   12.4           Glucose       116       Group & Rh O POS             Hematocrit 18.8       20.4       Hemoglobin 5.6       6.0       Hepatitis C Ab     Non-Reactive         HIV 1/2 Ag/Ab     Non-Reactive         Immature Grans (Abs) 0.05  Comment: Mild elevation in immature granulocytes is non specific and can be seen in a variety of conditions including stress response, acute inflammation, trauma and pregnancy. Correlation with other laboratory and clinical findings is essential.       0.05  Comment: Mild elevation in immature granulocytes is non specific and can be seen in a variety of conditions including stress response, acute inflammation, trauma and pregnancy. Correlation with other laboratory and clinical findings is essential.       Immature Granulocytes 0.6       0.5       INDIRECT MARYBETH NEG             Iron   11           Iron Saturation   3           Lymph # 0.63       0.79       LYMPH % 7.3       8.7       Magnesium        2.1       MCH 23.6       23.3       MCHC 29.8       29.4       MCV 79       79       Mono # 0.74       0.76       Mono % 8.6       8.4       MPV 9.8       10.4       Neut # 7.1       7.4       Neut % 82.9       81.2       nRBC 1       1       NT-proBNP     2,198         Platelet Count 280       354       Potassium       4.3       Product Code [id number]  [P]             PROTEIN TOTAL       6.2       RBC 2.37       2.57       RDW 17.6       17.7       Retic   3.3           Sodium       138       Specimen Outdate 03/28/2025 23:59             TIBC   337           Transferrin   228           Troponin I High Sensitivity 15       16       TSH 0.749             Unit ABO/Rh O POS  [P]             UNIT NUMBER [id number]  [P]             WBC 8.61       9.10                [P] - Preliminary Result                Significant Imaging: I have reviewed all pertinent imaging results/findings within the past 24 hours.  I have reviewed and interpreted all pertinent imaging results/findings within the past 24 hours.

## 2025-03-26 NOTE — CONSULTS
GASTROENTEROLOGY INPATIENT CONSULT NOTE  Patient Name: Vel Banda  Patient MRN: [id number]  Patient : 1927    Admit Date: 3/25/2025  Service date: 3/26/2025    Reason for Consult: GI bleed, new onset atrial fibrillation    PCP: Anne Beebe MD    Chief Complaint   Patient presents with    Shortness of Breath       HPI: Patient is  89-year-old male history of CKD 3, HTN, HLD, gout presented to ED at [facility] with shortness of breath over the last month, progressively worsening dyspnea on exertion over the last week.  Patient denies chest pain, hemoptysis, orthopnea, PND, syncope, rectal bleeding..  In ED labwork remarkable for hemoglobin of 6.0 with a baseline of 15 last taken 18 months ago.  Troponin negative x2, BNP EKG AFib with LBBB, rate controlled  Chemistry shows a creatinine of 2.1 with patients baseline 1.5.  Patient has had iron studies which shows low iron normal TIBC, low ferritin low iron sat and elevated reticulocyte count.  Fecal occult positive for blood.  D-dimer elevated at 1.1 which could be related to GI bleed., patient has a new onset AFib.  Patient was unable to have CTA chest secondary to elevated creatinine.  Patient was transfuse 1 unit packed red blood cells at prior facility.  GI was made aware.  Patient was given 80 mg Protonix bolus And given dose of 20 mg of Lasix.  Chest x-ray shows pulmonary edema.  Patient tolerating room air.  Patient transferred to [facility] for GI eval.  On exam patient only complains of dyspnea on exertion and fatigue.  Vitals are all stable.  AFib is controlled rate.  Patient will be admitted to Hospital Medicine will consult GI and Cardiology.   V/Q scan is negative     Patient did report dark specks material in his stool but can not quantify the timing of that      CHART REVIEW:  EGD colonoscopy 2022  for large AVMs treated with APC.  Hemorrhoids.  Grade B esophagitis.  Small hiatal hernia.  Moderate antral  gastritis.  Superficial gastric ulcers  EGD 2022 grade a esophagitis.  6 cm hiatal hernia.  Moderate antral gastritis    Past Medical History:  Past Medical History:   Diagnosis Date    Actinic keratosis     Benign prostatic hyperplasia     Chronic kidney disease     Stage 3     Fibromyositis     Gouty arthropathy     Hyperlipidemia     Hypertension     Osteoarthritis of knee         Past Surgical History:  Past Surgical History:   Procedure Laterality Date    APPENDECTOMY      [email or web address]  2011    COLONOSCOPY      COLONOSCOPY N/A 2022    Procedure: COLONOSCOPY;  Surgeon: Edgar May MD;  Location: [facility] ENDO;  Service: Endoscopy;  Laterality: N/A;    ESOPHAGOGASTRODUODENOSCOPY N/A 2022    Procedure: EGD (ESOPHAGOGASTRODUODENOSCOPY);  Surgeon: Edgar May MD;  Location: [facility] ENDO;  Service: Endoscopy;  Laterality: N/A;    ESOPHAGOGASTRODUODENOSCOPY N/A 2022    Procedure: EGD (ESOPHAGOGASTRODUODENOSCOPY);  Surgeon: Edgar May MD;  Location: [facility] ENDO;  Service: Endoscopy;  Laterality: N/A;    UPPER GASTROINTESTINAL ENDOSCOPY          Home Medications:  Prescriptions Prior to Admission[1]    Inpatient Medications:  Review of patient's allergies indicates:   Allergen Reactions    Pcn [penicillins]        Social History:   Social History     Occupational History    Not on file   Tobacco Use    Smoking status: Former     Current packs/day: 0.00     Types: Cigarettes     Quit date:      Years since quittin.2    Smokeless tobacco: Current     Types: Chew   Substance and Sexual Activity    Alcohol use: Yes     Comment: occasionally    Drug use: Never    Sexual activity: Not Currently       Family History:   Family History   Problem Relation Name Age of Onset    Hypertension Mother      Coronary artery disease Mother      Colon cancer Neg Hx      Crohn's disease Neg Hx      Ulcerative colitis Neg Hx      Stomach cancer Neg Hx      Esophageal cancer Neg Hx          Review of Systems:  GENERAL: No fever, chills, weight loss  SKIN: No rashes, jaundice, pruritus  HEENT: No rhinorrhea, epistaxis, vision changes.  No trauma, tinnitus, lymphadenopathy or pharyngitis  CV: No chest pain, palpitations, edima or CANNON  PULM: No cough or sputum production.  No wheezing.  GI: AS IN HPI  URINARY:  No hematuria, dysuria  MS: No change in muscle or joint pain, weakness, or ROM  Neuro: No focal neurologic changes  PSYCH: No change in mood or personality.  No suicidal ideation.  ENDOCRINE: No fatigue      OBJECTIVE:    Vitals:    03/26/25 0719 03/26/25 0721 03/26/25 0748 03/26/25 1140   BP:  130/82 130/66 127/70   Pulse: 85 96 92 102   Resp: 18 18 16 16   Temp:  98 °F (36.7 °C) 98.2 °F (36.8 °C) 97.6 °F (36.4 °C)   TempSrc:  Oral Oral Oral   SpO2: 98% 97% 96% 95%   Weight:       Height:         Physical Exam:    GEN: well-developed, well-nourished, awake and alert, non-toxic appearing adult  HEENT: PERRL, sclera anicteric, oral mucosa pink and moist without lesion  NECK: trachea midline; Good ROM  CV: regular rate and rhythm, no murmurs or gallops  RESP: clear to auscultation bilaterally, no wheezes, rhonci or rales  ABD: soft, non-tender, non-distended, normal bowel sounds  EXT: no swelling or edema, 2+ pulses distally  SKIN: no rashes or jaundice  PSYCH: normal affect    Labs:   Recent Labs   Lab 03/25/25  1547 03/25/25  1632 03/26/25  0055 03/26/25  1016   WBC 9.10 8.61  --  10.46   HGB 6.0* 5.6* 6.9* 8.4*    280  --  300   MCV 79* 79*  --  81*     Recent Labs   Lab 03/25/25  1631   IRON 11*   FERRITIN 12.4*     Recent Labs   Lab 03/25/25  1547 03/26/25  0427    139   K 4.3 4.2   BUN 57* 62*   CREATININE 2.1* 2.3*   ALBUMIN 3.2* 3.4*   AST 23 13   ALT 8* 7*   ALKPHOS 86 74            Radiology Review:  Imaging Results              NM Lung Ventilation Perfusion Imaging (Final result)  Result time 03/26/25 11:05:05      Final result by Marlen Cunningham MD (03/26/25  11:05:05)                   Impression:      Low probability for acute PE.      Electronically signed by: Marlen Cunningham  Date:    03/26/2025  Time:    11:05               Narrative:    CLINICAL HISTORY:  ([id number])89 y/o  (4/6/1927) M    PE suspected, intermediate prob, neg D-dimer;    TECHNIQUE:  (A#[id number], exam time 3/26/2025 10:46)    NM LUNG VENTILATION AND PERFUSION IMAGING [id number]    After the inhalation of aerosol from a nebulizer containing 32 mCi Tc-99m DTPA, 8 standard projections of the lungs were acquired. Then, 6 mCi Tc-99m MAA was injected intravenously and the same views were repeated.    COMPARISON:  Chest x-ray dated 03/26/2025 showing mild interstitial edema    FINDINGS:  Ventilation images demonstrate homogeneous tracer distribution.    Perfusion images demonstrate small subsegmental defect in right upper lobe.  There are no moderate or large subsegmental or segmental perfusion defects.                                       X-Ray Chest AP Portable (Final result)  Result time 03/25/25 17:13:05      Final result by Ramona Kan MD (03/25/25 17:13:05)                   Impression:      Appearance suggesting CHF      Electronically signed by: Ramona Kan MD  Date:    03/25/2025  Time:    17:13               Narrative:    EXAMINATION:  XR CHEST AP PORTABLE    CLINICAL HISTORY:  Shortness of breath;    TECHNIQUE:  Single frontal view of the chest was performed.    COMPARISON:  None    FINDINGS:  Cardiac silhouette appears enlarged.  Prominent perihilar markings suggesting pulmonary vascular distention or mild perihilar infiltrate and CHF.  Small right pleural effusion and questionable very small left pleural effusion                                          IMPRESSION / RECOMMENDATIONS:   Active Problem List with Overview Notes    Diagnosis Date Noted    New onset atrial fibrillation 03/26/2025    Symptomatic anemia 03/26/2025    ABLA (acute blood loss anemia) 03/26/2025    Gout  03/26/2025    CANNON (dyspnea on exertion) 03/26/2025    Elevated d-dimer 03/26/2025    Gastrointestinal hemorrhage, unspecified gastrointestinal hemorrhage type 03/25/2025    Nosebleed 08/10/2023    Dizziness 08/10/2023    Elevated PSA, between 10 and less than 20 ng/ml 04/06/2023    Iron deficiency anemia due to chronic blood loss 11/10/2022    Benign prostatic hyperplasia 01/14/2021    Essential hypertension 01/14/2021    Fibromyositis 01/14/2021    Chronic gouty arthropathy 01/14/2021    Mixed hyperlipidemia 01/14/2021    Stage 3 chronic kidney disease 11/08/2018    Osteoarthritis of knee 11/08/2017    Actinic keratosis 11/09/2016      89-year-old gentleman admitted to the hospital with weakness from severe anemia with a hemoglobin of 5.6 resulting in atrial fibrillation and dyspnea on exertion  Cardiology consult ongoing.  Beta natriuretic peptide elevated with small pleural effusion  V/Q scan low risk for pulmonary embolus  Occult blood loss anemia with a history of large hiatal hernia and AVMs suspect chronic GI blood loss from both of these pathologies but we will expedite EGD today  RIsks, benefits, alternatives discussed in detail regarding upcoming procedures and sedation and possible complications. Some of the more common endoscopic complications include but not limited to immediate or delayed perforation, bleeding, infections, pain, inadvertent injury to surrounding tissue / organs and possible need for surgical evaluation. All questions answered and will proceed with procedure as planned.      Thank you for this consult.    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  Colonoscopy performed 2022 with AVMs and hemorrhoids noted.  Patient with multifactorial anemia.  Recommend conservative management with PPI indefinitely and hold on colonoscopy as we support his cardiac event              [1]    Medications Prior to Admission   Medication Sig Dispense Refill Last Dose/Taking    allopurinoL (ZYLOPRIM) 300 MG tablet TAKE 1 TABLET BY MOUTH EVERY DAY 90 tablet 0     aspirin 81 MG Chew Take 81 mg by mouth once daily.       cholestyramine, with sugar, 4 gram Powd Take 1 Scoop by mouth once daily. 3 each 3     hydroCHLOROthiazide (MICROZIDE) 12.5 mg capsule TAKE 1 CAPSULE BY MOUTH EVERY DAY 90 capsule 0     latanoprost 0.005 % ophthalmic solution 1 drop.       lisinopriL (PRINIVIL,ZESTRIL) 20 MG tablet Take 1 tablet (20 mg total) by mouth once daily. 90 tablet 3     polyethylene glycol (GLYCOLAX) 17 gram PwPk Take 17 g by mouth once daily. 100 packet 3     tamsulosin (FLOMAX) 0.4 mg Cap TAKE 1 CAPSULE BY MOUTH EVERY DAY 90 capsule 0     travoprost, benzalkonium, (TRAVATAN) 0.004 % ophthalmic solution 1 drop every evening.

## 2025-03-26 NOTE — HPI
Patient is a 89-year-old male history of CKD 3, HTN, HLD, gout presented to ED at [facility] with shortness of breath over the last month, progressively worsening dyspnea on exertion over the last week.  Patient denies chest pain, hemoptysis, orthopnea, PND, syncope, rectal bleeding..  In ED labwork remarkable for hemoglobin of 6.0 with a baseline of 15 last taken 18 months ago.  Troponin negative x2, BNP EKG AFib with LBBB, rate controlled  Chemistry shows a creatinine of 2.1 with patients baseline 1.5.  Patient has had iron studies which shows low iron normal TIBC, low ferritin low iron sat and elevated reticulocyte count.  Fecal occult positive for blood.  D-dimer elevated at 1.1 which could be related to GI bleed., patient has a new onset AFib.  Patient was unable to have CTA chest secondary to elevated creatinine.  Patient was transfuse 1 unit packed red blood cells at prior facility.  GI was made aware.  Patient was given 80 mg Protonix bolus And given dose of 20 mg of Lasix.  Chest x-ray shows pulmonary edema.  Patient tolerating room air.  Patient transferred to [facility] for GI eval.  On exam patient only complains of dyspnea on exertion and fatigue.  Vitals are all stable.  AFib is controlled rate.  Patient will be admitted to Hospital Medicine will consult GI and Cardiology.

## 2025-03-26 NOTE — ASSESSMENT & PLAN NOTE
Patient's hemorrhage is due to gastrointestinal bleed, this bleeding is not associated with a medication or a coagulopathy. Patients most recent Hgb, Hct, platelets, and INR are listed below.  Recent Labs     03/25/25  1547 03/25/25  1632   HGB 6.0* 5.6*   HCT 20.4* 18.8*    280     Plan  - Will trend hemoglobin/hematocrit Every 8 hours  - Will monitor and correct any coagulation defects  - Will transfuse if Hgb is <7g/dl (<8g/dl in cases of active ACS) or if patient has rapid bleeding leading to hemodynamic instability  -

## 2025-03-26 NOTE — ASSESSMENT & PLAN NOTE
Patient's hemorrhage is due to gastrointestinal bleed, this bleeding is not associated with a medication or a coagulopathy. Patients most recent Hgb, Hct, platelets, and INR are listed below.  Recent Labs     03/25/25  1547 03/25/25  1632   HGB 6.0* 5.6*   HCT 20.4* 18.8*    280     Plan  - Will trend hemoglobin/hematocrit Every 8 hours  - Will monitor and correct any coagulation defects  - Will transfuse if Hgb is <7g/dl (<8g/dl in cases of active ACS) or if patient has rapid bleeding leading to hemodynamic instability  - consult GI  -started on PPI b.i.d.  -occult blood positive  -

## 2025-03-26 NOTE — PROGRESS NOTES
Patient is off the unit for EGD  Cardiology reviewed chart.  Recommend diuresis with Lasix.  Echocardiogram done, results are pending  Cardiology will follow up tomorrow for full consult             MARTINA Westbrook, EDIN-BC    PATIENT WITH MARKED ANEMIA AND HEART FAILURE.  HE HAS OFF THE FLOOR FOR EGD.  THE CHART WAS REVIEWED.  CASE DISCUSSED WITH THE NURSE  HEMOGLOBIN HAS GONE UP FROM 5.5-8.4  CHEST X-RAY SHOWS HEART FAILURE PULMONARY EDEMA  BNP     HEART FAILURE PROBABLY SECONDARY TO ACUTE ANEMIA HE IS CURRENTLY STABLE  LASIX WAS HELD SECONDARY TO INCREASE IN THE BUN AND CREATININE    ECHO STILL PENDING AND WILL GET A CHEST X-RAY IN THE MORNING

## 2025-03-26 NOTE — ASSESSMENT & PLAN NOTE
Anemia is likely due to acute blood loss which was from GI bleed . Most recent hemoglobin and hematocrit are listed below.  Recent Labs     03/25/25  1547 03/25/25  1632   HGB 6.0* 5.6*   HCT 20.4* 18.8*     Plan  - Monitor serial CBC: Every 8 hours  - Transfuse PRBC if patient becomes hemodynamically unstable, symptomatic or H/H drops below 7/21.  - Patient has received 1 units of PRBCs on 3/25/2025  - Patient's anemia is currently worsening. Will continue current treatment  - recheck stat H&H on arrival, transfuse if hemoglobin less than 7.

## 2025-03-26 NOTE — H&P
[facility]  History & Physical    Patient Name: Vel Banda  MRN: [id number]  Patient Class: IP- Inpatient  Admission Date: 3/25/2025  Attending Physician: No att. providers found   Primary Care Provider: Anne Beebe MD         Patient information was obtained from patient and ER records.     Subjective:     Principal Problem:Gastrointestinal hemorrhage, unspecified gastrointestinal hemorrhage type    Chief Complaint:   Chief Complaint   Patient presents with    Shortness of Breath        HPI: Patient is a 89-year-old male history of CKD 3, HTN, HLD, gout presented to ED at [facility] with shortness of breath over the last month, progressively worsening dyspnea on exertion over the last week.  Patient denies chest pain, hemoptysis, orthopnea, PND, syncope, rectal bleeding..  In ED labwork remarkable for hemoglobin of 6.0 with a baseline of 15 last taken 18 months ago.  Troponin negative x2, BNP EKG AFib with LBBB, rate controlled  Chemistry shows a creatinine of 2.1 with patients baseline 1.5.  Patient has had iron studies which shows low iron normal TIBC, low ferritin low iron sat and elevated reticulocyte count.  Fecal occult positive for blood.  D-dimer elevated at 1.1 which could be related to GI bleed., patient has a new onset AFib.  Patient was unable to have CTA chest secondary to elevated creatinine.  Patient was transfuse 1 unit packed red blood cells at prior facility.  GI was made aware.  Patient was given 80 mg Protonix bolus And given dose of 20 mg of Lasix.  Chest x-ray shows pulmonary edema.  Patient tolerating room air.  Patient transferred to [facility] for GI eval.  On exam patient only complains of dyspnea on exertion and fatigue.  Vitals are all stable.  AFib is controlled rate.  Patient will be admitted to Hospital Medicine will consult GI and Cardiology.    Past Medical History:   Diagnosis Date    Actinic keratosis     Benign prostatic  hyperplasia     Chronic kidney disease     Stage 3     Fibromyositis     Gouty arthropathy     Hyperlipidemia     Hypertension     Osteoarthritis of knee        Past Surgical History:   Procedure Laterality Date    APPENDECTOMY      cataractsx2  02/21/2011    COLONOSCOPY      COLONOSCOPY N/A 08/01/2022    Procedure: COLONOSCOPY;  Surgeon: Edgar May MD;  Location: [facility] ENDO;  Service: Endoscopy;  Laterality: N/A;    ESOPHAGOGASTRODUODENOSCOPY N/A 08/01/2022    Procedure: EGD (ESOPHAGOGASTRODUODENOSCOPY);  Surgeon: Edgar May MD;  Location: [facility] ENDO;  Service: Endoscopy;  Laterality: N/A;    ESOPHAGOGASTRODUODENOSCOPY N/A 9/12/2022    Procedure: EGD (ESOPHAGOGASTRODUODENOSCOPY);  Surgeon: Edgar May MD;  Location: [facility];  Service: Endoscopy;  Laterality: N/A;    UPPER GASTROINTESTINAL ENDOSCOPY         Review of patient's allergies indicates:   Allergen Reactions    Pcn [penicillins]        No current facility-administered medications on file prior to encounter.     Current Outpatient Medications on File Prior to Encounter   Medication Sig    allopurinoL (ZYLOPRIM) 300 MG tablet TAKE 1 TABLET BY MOUTH EVERY DAY    aspirin 81 MG Chew Take 81 mg by mouth once daily.    cholestyramine, with sugar, 4 gram Powd Take 1 Scoop by mouth once daily.    hydroCHLOROthiazide (MICROZIDE) 12.5 mg capsule TAKE 1 CAPSULE BY MOUTH EVERY DAY    latanoprost 0.005 % ophthalmic solution 1 drop.    lisinopriL (PRINIVIL,ZESTRIL) 20 MG tablet Take 1 tablet (20 mg total) by mouth once daily.    polyethylene glycol (GLYCOLAX) 17 gram PwPk Take 17 g by mouth once daily.    tamsulosin (FLOMAX) 0.4 mg Cap TAKE 1 CAPSULE BY MOUTH EVERY DAY    travoprost, benzalkonium, (TRAVATAN) 0.004 % ophthalmic solution 1 drop every evening.     Family History       Problem Relation (Age of Onset)    Coronary artery disease Mother    Hypertension Mother          Tobacco Use    Smoking status: Former     Current packs/day: 0.00      Types: Cigarettes     Quit date:      Years since quittin.2    Smokeless tobacco: Current     Types: Chew   Substance and Sexual Activity    Alcohol use: Yes     Comment: occasionally    Drug use: Never    Sexual activity: Not Currently     Review of Systems   Constitutional:  Positive for fatigue.   Respiratory:  Positive for shortness of breath (Dyspnea on exertion).    Neurological:  Positive for dizziness (intermittently upon standing).   All other systems reviewed and are negative.    Objective:     Vital Signs (Most Recent):  Temp: 98.5 °F (36.9 °C) (25)  Pulse: 102 (25 2350)  Resp: 18 (25)  BP: 129/70 (25)  SpO2: 97 % (25) Vital Signs (24h Range):  Temp:  [98.1 °F (36.7 °C)-98.8 °F (37.1 °C)] 98.5 °F (36.9 °C)  Pulse:  [] 102  Resp:  [18-20] 18  SpO2:  [96 %-100 %] 97 %  BP: (108-148)/(53-81) 129/70     Weight: 81.6 kg (180 lb)  Body mass index is 29.05 kg/m².     Physical Exam  Vitals reviewed.   Constitutional:       Appearance: Normal appearance.   HENT:      Head: Normocephalic.      Nose: Nose normal.      Mouth/Throat:      Mouth: Mucous membranes are moist.      Pharynx: Oropharynx is clear.   Eyes:      Pupils: Pupils are equal, round, and reactive to light.   Cardiovascular:      Rate and Rhythm: Normal rate. Rhythm irregular.      Pulses: Normal pulses.      Heart sounds: Normal heart sounds.   Pulmonary:      Effort: Pulmonary effort is normal. Tachypnea present. No respiratory distress.      Breath sounds: Normal breath sounds and air entry.   Abdominal:      General: Bowel sounds are normal.      Palpations: Abdomen is soft.   Musculoskeletal:         General: Normal range of motion.      Cervical back: Normal range of motion.      Right lower leg: No edema.      Left lower leg: No edema.   Skin:     General: Skin is warm and dry.      Capillary Refill: Capillary refill takes less than 2 seconds.   Neurological:      General: No  focal deficit present.      Mental Status: He is alert and oriented to person, place, and time. Mental status is at baseline.   Psychiatric:         Mood and Affect: Mood normal.         Behavior: Behavior normal.              CRANIAL NERVES     CN III, IV, VI   Pupils are equal, round, and reactive to light.       Significant Labs: All pertinent labs within the past 24 hours have been reviewed.  Recent Lab Results         03/25/25  1632   03/25/25  1631   03/25/25  1605   03/25/25  1547        Unit Blood Type Code 5100  [P]             Unit Expiration [id number]  [P]             ABO and RH     O POS         Albumin       3.2       ALP       86       ALT       8       Anion Gap       8       AST       23       Baso # 0.03       0.05       Basophil % 0.3       0.5       BILIRUBIN TOTAL       0.4  Comment: For infants and newborns, interpretation of results should be based   on gestational age, weight and in agreement with clinical   observations.    Premature Infant recommended reference ranges:   0-24 hours:  <8.0 mg/dL   24-48 hours: <12.0 mg/dL   3-5 days:    <15.0 mg/dL   6-29 days:   <15.0 mg/dL       BUN       57       Calcium       8.6       Chloride       111       CO2       19       Creatinine       2.1       Crossmatch Interpretation Compatible  [P]             D-Dimer       1.11  Comment: The quantitative D-dimer assay should be used as an aid in the diagnosis of deep vein thrombosis and pulmonary embolism in patients with the appropriate presentation and clinical history. The upper limit of the reference interval and the clinical cut off point are identical. Causes of a positive (>0.50 mg/L FEU) D-Dimer test include, but are not limited to: DVT, PE, DIC, thrombolytic therapy, anticoagulant therapy, recent surgery, trauma, or pregnancy, disseminated malignancy, aortic aneurysm, cirrhosis, and severe infection. False negative results may occur in patients with distal DVT.       DISPENSE STATUS  Issued  [P]             eGFR       28       Eos # 0.03       0.06       Eos % 0.3       0.7       Ferritin   12.4           Glucose       116       Group & Rh O POS             Hematocrit 18.8       20.4       Hemoglobin 5.6       6.0       Hepatitis C Ab     Non-Reactive         HIV 1/2 Ag/Ab     Non-Reactive         Immature Grans (Abs) 0.05  Comment: Mild elevation in immature granulocytes is non specific and can be seen in a variety of conditions including stress response, acute inflammation, trauma and pregnancy. Correlation with other laboratory and clinical findings is essential.       0.05  Comment: Mild elevation in immature granulocytes is non specific and can be seen in a variety of conditions including stress response, acute inflammation, trauma and pregnancy. Correlation with other laboratory and clinical findings is essential.       Immature Granulocytes 0.6       0.5       INDIRECT MARYBETH NEG             Iron   11           Iron Saturation   3           Lymph # 0.63       0.79       LYMPH % 7.3       8.7       Magnesium        2.1       MCH 23.6       23.3       MCHC 29.8       29.4       MCV 79       79       Mono # 0.74       0.76       Mono % 8.6       8.4       MPV 9.8       10.4       Neut # 7.1       7.4       Neut % 82.9       81.2       nRBC 1       1       NT-proBNP     2,198         Platelet Count 280       354       Potassium       4.3       Product Code H1219I45  [P]             PROTEIN TOTAL       6.2       RBC 2.37       2.57       RDW 17.6       17.7       Retic   3.3           Sodium       138       Specimen Outdate 03/28/2025 23:59             TIBC   337           Transferrin   228           Troponin I High Sensitivity 15       16       TSH 0.749             Unit ABO/Rh O POS  [P]             UNIT NUMBER [id number]  [P]             WBC 8.61       9.10                [P] - Preliminary Result               Significant Imaging: I have reviewed all pertinent imaging results/findings  within the past 24 hours.  I have reviewed and interpreted all pertinent imaging results/findings within the past 24 hours.  Assessment/Plan:     * Gastrointestinal hemorrhage, unspecified gastrointestinal hemorrhage type  Patient's hemorrhage is due to gastrointestinal bleed, this bleeding is not associated with a medication or a coagulopathy. Patients most recent Hgb, Hct, platelets, and INR are listed below.  Recent Labs     03/25/25  1547 03/25/25  1632   HGB 6.0* 5.6*   HCT 20.4* 18.8*    280     Plan  - Will trend hemoglobin/hematocrit Every 8 hours  - Will monitor and correct any coagulation defects  - Will transfuse if Hgb is <7g/dl (<8g/dl in cases of active ACS) or if patient has rapid bleeding leading to hemodynamic instability  - consult GI  -started on PPI b.i.d.  -occult blood positive  -    Elevated d-dimer  D-dimer 1.11 which could be contributed by acute GI bleed  Unable to CTA chest secondary to elevated creatinine    Low priority V/Q scan ordered for PE rule out         CANNON (dyspnea on exertion)  Dyspnea on exertion likely multifactorial in the setting of low H&H, new onset atrial fibrillation.    Troponin negative x2,   BNP elevated.    Chest x-ray with pulmonary edema  Atrial fibrillation rate controlled    Echocardiogram ordered  Transfuse blood    Gout  Resume home dose allopurinol, no acute gout pain    ABLA (acute blood loss anemia)  Anemia is likely due to acute blood loss which was from GI bleed . Most recent hemoglobin and hematocrit are listed below.  Recent Labs     03/25/25  1547 03/25/25  1632   HGB 6.0* 5.6*   HCT 20.4* 18.8*     Plan  - Monitor serial CBC: Every 8 hours  - Transfuse PRBC if patient becomes hemodynamically unstable, symptomatic or H/H drops below 7/21.  - Patient has received 1 units of PRBCs on 3/25/2025  - Patient's anemia is currently worsening. Will continue current treatment  - patient received 1 unit packed red blood cell at [facility]  -recheck H&H post transfusion on admit  -give additional PRBC if hemoglobin less than 7    Symptomatic anemia  Anemia is likely due to acute blood loss which was from GI bleed . Most recent hemoglobin and hematocrit are listed below.  Recent Labs     03/25/25  1547 03/25/25  1632   HGB 6.0* 5.6*   HCT 20.4* 18.8*     Plan  - Monitor serial CBC: Every 8 hours  - Transfuse PRBC if patient becomes hemodynamically unstable, symptomatic or H/H drops below 7/21.  - Patient has received 1 units of PRBCs on 3/25/2025  - Patient's anemia is currently worsening. Will continue current treatment  - recheck stat H&H on arrival, transfuse if hemoglobin less than 7.    New onset atrial fibrillation  Patient has paroxysmal (<7 days) atrial fibrillation. Patient is currently in atrial fibrillation. [id number] Score: 2. The patients heart rate in the last 24 hours is as follows:  Pulse  Min: 89  Max: 109     Antiarrhythmics       Anticoagulants       Plan  - Replete lytes with a goal of K>4, Mg >2  - Patient is not anticoagulated due to acute GI bleed  - Patient's afib is currently controlled  - given patient's acute symptomatic anemia, hold rate control medicine, we will treat with p.r.n. IV meds if needed until patient's hemoglobin and hematocrit in a range not needing transfusion.      Stage 3 chronic kidney disease  Creatine stable for now. BMP reviewed- noted Estimated Creatinine Clearance: 20.2 mL/min (A) (based on SCr of 2.1 mg/dL (H)). according to latest data. Based on current GFR, CKD stage is stage 3 - GFR 30-59.  Monitor UOP and serial BMP and adjust therapy as needed. Renally dose meds. Avoid nephrotoxic medications and procedures..    Worsened creatinine likely secondary to acute anemia, additionally with lisinopril on board.  Hold home lisinopril and HCTZ  Recheck labwork post transfusion    Essential hypertension  Patient's blood pressure range in the last 24 hours was: BP  Min: 108/53  Max: 148/60.The patient's  inpatient anti-hypertensive regimen is listed below:  Current Antihypertensives       Plan  - BP is controlled, no changes needed to their regimen  - given patient's acute anemia, we will hold any antihypertensives until patient with better volume status      VTE Risk Mitigation (From admission, onward)           Ordered     Reason for No Pharmacological VTE Prophylaxis  Once        Question:  Reasons:  Answer:  Active Bleeding    03/25/25 2348     IP VTE HIGH RISK PATIENT  Once         03/25/25 2348     Place sequential compression device  Until discontinued         03/25/25 2348                                    Fitz Cheek NP  Department of Hospital Medicine  [facility]

## 2025-03-26 NOTE — ASSESSMENT & PLAN NOTE
Creatine stable for now. BMP reviewed- noted Estimated Creatinine Clearance: 20.2 mL/min (A) (based on SCr of 2.1 mg/dL (H)). according to latest data. Based on current GFR, CKD stage is stage 3 - GFR 30-59.  Monitor UOP and serial BMP and adjust therapy as needed. Renally dose meds. Avoid nephrotoxic medications and procedures..    Worsened creatinine likely secondary to acute anemia, additionally with lisinopril on board.  Hold home lisinopril and HCTZ  Recheck labwork post transfusion

## 2025-03-26 NOTE — PLAN OF CARE
Problem: Adult Inpatient Plan of Care  Goal: Plan of Care Review  Outcome: Progressing  Goal: Patient-Specific Goal (Individualized)  Outcome: Progressing  Goal: Absence of Hospital-Acquired Illness or Injury  Outcome: Progressing  Goal: Optimal Comfort and Wellbeing  Outcome: Progressing  Goal: Readiness for Transition of Care  Outcome: Progressing     Problem: Fall Injury Risk  Goal: Absence of Fall and Fall-Related Injury  Outcome: Progressing     Problem: Gastrointestinal Bleeding  Goal: Optimal Coping with Acute Illness  Outcome: Progressing  Goal: Hemostasis  Outcome: Progressing

## 2025-03-26 NOTE — ASSESSMENT & PLAN NOTE
Dyspnea on exertion likely multifactorial in the setting of low H&H, new onset atrial fibrillation.    Troponin negative x2,   BNP elevated.    Chest x-ray with pulmonary edema  Atrial fibrillation rate controlled    Echocardiogram ordered  Transfuse blood  Lasix x 1 dose

## 2025-03-26 NOTE — ASSESSMENT & PLAN NOTE
Dyspnea on exertion likely multifactorial in the setting of low H&H, new onset atrial fibrillation.    Troponin negative x2,   BNP elevated.    Chest x-ray with pulmonary edema  Atrial fibrillation rate controlled    Echocardiogram ordered  Transfuse blood

## 2025-03-26 NOTE — ASSESSMENT & PLAN NOTE
Patient has paroxysmal (<7 days) atrial fibrillation. Patient is currently in atrial fibrillation. [id number] Score: 2. The patients heart rate in the last 24 hours is as follows:  Pulse  Min: 89  Max: 109     Antiarrhythmics       Anticoagulants       Plan  - Replete lytes with a goal of K>4, Mg >2  - Patient is not anticoagulated due to acute GI bleed  - Patient's afib is currently controlled  - given patient's acute symptomatic anemia, hold rate control medicine, we will treat with p.r.n. IV meds if needed until patient's hemoglobin and hematocrit in a range not needing transfusion.

## 2025-03-26 NOTE — NURSING
Patient down to nuclear medicine via bed at 0901 for VQ scan.     Patient back from VQ scan at 1005 via bed.

## 2025-03-26 NOTE — ED NOTES
Pt able to use urinal at bedside & able to reposition self on bed without assist. Pt reports sob on exertion. Spo2 98& on roomair. Pt placed on 1L O2 NC for comfort.

## 2025-03-26 NOTE — ASSESSMENT & PLAN NOTE
Patient's blood pressure range in the last 24 hours was: BP  Min: 108/53  Max: 148/60.The patient's inpatient anti-hypertensive regimen is listed below:  Current Antihypertensives       Plan  - BP is controlled, no changes needed to their regimen  - given patient's acute anemia, we will hold any antihypertensives until patient with better volume status

## 2025-03-26 NOTE — ANESTHESIA PREPROCEDURE EVALUATION
03/26/2025  Vel Banda is a 89 y.o., male.    Problem List[1]    Past Surgical History:   Procedure Laterality Date    APPENDECTOMY      cataractsx2  02/21/2011    COLONOSCOPY      COLONOSCOPY N/A 08/01/2022    Procedure: COLONOSCOPY;  Surgeon: Edgar May MD;  Location: [facility] ENDO;  Service: Endoscopy;  Laterality: N/A;    ESOPHAGOGASTRODUODENOSCOPY N/A 08/01/2022    Procedure: EGD (ESOPHAGOGASTRODUODENOSCOPY);  Surgeon: Edgar May MD;  Location: [facility] ENDO;  Service: Endoscopy;  Laterality: N/A;    ESOPHAGOGASTRODUODENOSCOPY N/A 9/12/2022    Procedure: EGD (ESOPHAGOGASTRODUODENOSCOPY);  Surgeon: Edgar May MD;  Location: [facility];  Service: Endoscopy;  Laterality: N/A;    UPPER GASTROINTESTINAL ENDOSCOPY          Tobacco Use:  The patient  reports that he quit smoking about 50 years ago. His smoking use included cigarettes. His smokeless tobacco use includes chew.     No results found for this or any previous visit.     Imaging Results              NM Lung Ventilation Perfusion Imaging (Final result)  Result time 03/26/25 11:05:05      Final result by Marlen Cunningham MD (03/26/25 11:05:05)                   Impression:      Low probability for acute PE.      Electronically signed by: Marlen Cunningham  Date:    03/26/2025  Time:    11:05               Narrative:    CLINICAL HISTORY:  ([id number])89 y/o  (4/6/1927) M    PE suspected, intermediate prob, neg D-dimer;    TECHNIQUE:  (A#[id number], exam time 3/26/2025 10:46)    NM LUNG VENTILATION AND PERFUSION IMAGING [id number]    After the inhalation of aerosol from a nebulizer containing 32 mCi Tc-99m DTPA, 8 standard projections of the lungs were acquired. Then, 6 mCi Tc-99m MAA was injected intravenously and the same views were repeated.    COMPARISON:  Chest x-ray dated 03/26/2025 showing mild interstitial  edema    FINDINGS:  Ventilation images demonstrate homogeneous tracer distribution.    Perfusion images demonstrate small subsegmental defect in right upper lobe.  There are no moderate or large subsegmental or segmental perfusion defects.                                       X-Ray Chest AP Portable (Final result)  Result time 03/25/25 17:13:05      Final result by Ramona Kan MD (03/25/25 17:13:05)                   Impression:      Appearance suggesting CHF      Electronically signed by: Ramona Kan MD  Date:    03/25/2025  Time:    17:13               Narrative:    EXAMINATION:  XR CHEST AP PORTABLE    CLINICAL HISTORY:  Shortness of breath;    TECHNIQUE:  Single frontal view of the chest was performed.    COMPARISON:  None    FINDINGS:  Cardiac silhouette appears enlarged.  Prominent perihilar markings suggesting pulmonary vascular distention or mild perihilar infiltrate and CHF.  Small right pleural effusion and questionable very small left pleural effusion                                       Lab Results   Component Value Date    WBC 10.46 03/26/2025    HGB 8.4 (L) 03/26/2025    HCT 27.4 (L) 03/26/2025    MCV 81 (L) 03/26/2025     03/26/2025     BMP  Lab Results   Component Value Date     03/26/2025    K 4.2 03/26/2025     09/13/2023    CO2 19 (L) 03/26/2025    BUN 62 (H) 03/26/2025    CREATININE 2.3 (H) 03/26/2025    CALCIUM 8.7 03/26/2025    ANIONGAP 9 09/13/2023    GLU 97 09/13/2023    GLU 92 03/10/2023     09/29/2022       No results found for this or any previous visit.            Pre-op Assessment    I have reviewed the Patient Summary Reports.     I have reviewed the Nursing Notes. I have reviewed the NPO Status.   I have reviewed the Medications.     Review of Systems  Anesthesia Hx:  No problems with previous Anesthesia             Denies Family Hx of Anesthesia complications.    Denies Personal Hx of Anesthesia complications.                     Social:  Non-Smoker       Hematology/Oncology:  Hematology Normal                                     EENT/Dental:  EENT/Dental Normal           Cardiovascular:     Hypertension            CANNON  ECG has been reviewed. Left bundle branch block                           Pulmonary:      Shortness of breath                  Renal/:  Chronic Renal Disease, CKD                Hepatic/GI:  Hepatic/GI Normal                    Musculoskeletal:  Arthritis               Neurological:    Neuromuscular Disease,                                   Endocrine:  Endocrine Normal            Psych:  Psychiatric Normal                    Physical Exam  General: Well nourished    Airway:  Mallampati: II   Mouth Opening: Normal  TM Distance: Normal  Tongue: Normal  Neck ROM: Normal ROM    Chest/Lungs:  Clear to auscultation, Normal Respiratory Rate    Heart:  Rate: Normal  Rhythm: Regular Rhythm        Anesthesia Plan  Type of Anesthesia, risks & benefits discussed:    Anesthesia Type: Gen Natural Airway  Intra-op Monitoring Plan: Standard ASA Monitors  Post Op Pain Control Plan:   (medical reason for not using multimodal pain management)  Induction:  IV  Informed Consent: Informed consent signed with the Patient and all parties understand the risks and agree with anesthesia plan.  All questions answered.   ASA Score: 3  Anesthesia Plan Notes: General with natural airway.    Propofol  POM    Ready For Surgery From Anesthesia Perspective.     .           [1]   Patient Active Problem List  Diagnosis    Benign prostatic hyperplasia    Essential hypertension    Fibromyositis    Chronic gouty arthropathy    Mixed hyperlipidemia    Osteoarthritis of knee    Stage 3 chronic kidney disease    Actinic keratosis    Iron deficiency anemia due to chronic blood loss    Elevated PSA, between 10 and less than 20 ng/ml    Nosebleed    Dizziness    Gastrointestinal hemorrhage, unspecified gastrointestinal hemorrhage type    New onset atrial  fibrillation    Symptomatic anemia    ABLA (acute blood loss anemia)    Gout    CANNON (dyspnea on exertion)    Elevated d-dimer

## 2025-03-26 NOTE — CONSULTS
Formerly [facility], later [facility]  Department of Cardiology  Progress Note      PATIENT NAME: Vel Banda  MRN: [id number]  TODAY'S DATE:3/28/25  ADMIT DATE: 3/25/2025                          CONSULT REQUESTED BY: Kiera Ross MD    SUBJECTIVE     PRINCIPAL PROBLEM: Gastrointestinal hemorrhage, unspecified gastrointestinal hemorrhage type      REASON FOR CONSULT:  New onset afib    Interval history:  03/28/2025  Resting in bed comfortably, head of bed nearly flat without dyspnea, no edema to legs  Controlled atrial fibrillation on telemetry  Creatinine down to 2.0    HPI:    Chief Complaint:       Chief Complaint   Patient presents with    Shortness of Breath         HPI: Patient is a 89-year-old male history of CKD 3, HTN, HLD, gout presented to ED at [facility] with shortness of breath over the last month, progressively worsening dyspnea on exertion over the last week.  Patient denies chest pain, hemoptysis, orthopnea, PND, syncope, rectal bleeding..  In ED labwork remarkable for hemoglobin of 6.0 with a baseline of 15 last taken 18 months ago.  Troponin negative x2, BNP EKG AFib with LBBB, rate controlled  Chemistry shows a creatinine of 2.1 with patients baseline 1.5.  Patient has had iron studies which shows low iron normal TIBC, low ferritin low iron sat and elevated reticulocyte count.  Fecal occult positive for blood.  D-dimer elevated at 1.1 which could be related to GI bleed., patient has a new onset AFib.  Patient was unable to have CTA chest secondary to elevated creatinine.  Patient was transfuse 1 unit packed red blood cells at prior facility.  GI was made aware.  Patient was given 80 mg Protonix bolus And given dose of 20 mg of Lasix.  Chest x-ray shows pulmonary edema.  Patient tolerating room air.  Patient transferred to [facility] for GI eval.  On exam patient only complains of dyspnea on exertion and fatigue.  Vitals are all stable.  AFib is controlled rate. Pt denies prior history of  afib      Review of patient's allergies indicates:   Allergen Reactions    Pcn [penicillins]        Past Medical History:   Diagnosis Date    Actinic keratosis     Benign prostatic hyperplasia     Chronic kidney disease     Stage 3     Fibromyositis     Gouty arthropathy     Hyperlipidemia     Hypertension     Osteoarthritis of knee      Past Surgical History:   Procedure Laterality Date    APPENDECTOMY      [email or web address]  02/21/2011    COLONOSCOPY      COLONOSCOPY N/A 08/01/2022    Procedure: COLONOSCOPY;  Surgeon: Edgar May MD;  Location: [facility] ENDO;  Service: Endoscopy;  Laterality: N/A;    ESOPHAGOGASTRODUODENOSCOPY N/A 08/01/2022    Procedure: EGD (ESOPHAGOGASTRODUODENOSCOPY);  Surgeon: Edgar May MD;  Location: [facility] ENDO;  Service: Endoscopy;  Laterality: N/A;    ESOPHAGOGASTRODUODENOSCOPY N/A 9/12/2022    Procedure: EGD (ESOPHAGOGASTRODUODENOSCOPY);  Surgeon: Edgar May MD;  Location: [facility];  Service: Endoscopy;  Laterality: N/A;    UPPER GASTROINTESTINAL ENDOSCOPY       Social History[1]     REVIEW OF SYSTEMS  Negative except as mentioned in HPI    OBJECTIVE     VITAL SIGNS (Most Recent)  Temp: 98.2 °F (36.8 °C) (03/26/25 0748)  Pulse: 92 (03/26/25 0748)  Resp: 16 (03/26/25 0748)  BP: 130/66 (03/26/25 0748)  SpO2: 96 % (03/26/25 0748)    VENTILATION STATUS  Resp: 16 (03/26/25 0748)  SpO2: 96 % (03/26/25 0748)           I & O (Last 24H):  Intake/Output Summary (Last 24 hours) at 3/26/2025 0937  Last data filed at 3/26/2025 0907  Gross per 24 hour   Intake 1341 ml   Output 1075 ml   Net 266 ml       WEIGHTS  Wt Readings from Last 3 Encounters:   03/25/25 1518 81.6 kg (180 lb)   09/12/23 1521 85 kg (187 lb 6.3 oz)   08/10/23 1052 84.7 kg (186 lb 11.7 oz)       PHYSICAL EXAM    GENERAL: elderly male resting in bed .  HEENT: Normocephalic.    NECK: No JVD.   CARDIAC: IRRR; +murmur; CAFib w/ BBB noted on tele  CHEST ANATOMY: normal.   LUNGS:  Breathing comfortably on room air  ABDOMEN:  "Soft obese .  Normal bowel sounds.    EXTREMITIES: No edema  CENTRAL NERVOUS SYSTEM: AAO x 3  SKIN: No rash     HOME MEDICATIONS:Medications Ordered Prior to Encounter[2]    SCHEDULED MEDS:   allopurinoL  300 mg Oral Daily    furosemide (LASIX) injection  20 mg Intravenous Once    pantoprazole  40 mg Intravenous BID       CONTINUOUS INFUSIONS:    PRN MEDS:  Current Facility-Administered Medications:     0.9%  NaCl infusion (for blood administration), , Intravenous, Q24H PRN    acetaminophen, 650 mg, Oral, Q4H PRN    dextrose 50%, 12.5 g, Intravenous, PRN    dextrose 50%, 25 g, Intravenous, PRN    glucagon (human recombinant), 1 mg, Intramuscular, PRN    glucose, 16 g, Oral, PRN    glucose, 24 g, Oral, PRN    kit for prep of Tc-99m-albumin, 6 millicurie, Intravenous, ONCE PRN    naloxone, 0.02 mg, Intravenous, PRN    ondansetron, 4 mg, Intravenous, Q8H PRN    sodium chloride 0.9%, 10 mL, Intravenous, Q12H PRN    LABS AND DIAGNOSTICS     CBC LAST 3 DAYS  Recent Labs   Lab 03/25/25  1547 03/25/25  1632 03/26/25  0055   WBC 9.10 8.61  --    RBC 2.57* 2.37*  --    HGB 6.0* 5.6* 6.9*   HCT 20.4* 18.8* 22.4*   MCV 79* 79*  --    MCH 23.3* 23.6*  --    MCHC 29.4* 29.8*  --    RDW 17.7* 17.6*  --     280  --    MPV 10.4 9.8  --    NRBC 1* 1*  --        COAGULATION LAST 3 DAYS  No results for input(s): "LABPT", "INR", "APTT" in the last 168 hours.    CHEMISTRY LAST 3 DAYS  Recent Labs   Lab 03/25/25  1547 03/26/25  0427    139   K 4.3 4.2   CO2 19* 19*   BUN 57* 62*   CREATININE 2.1* 2.3*   CALCIUM 8.6* 8.7   MG 2.1 2.0   ALBUMIN 3.2* 3.4*   ALKPHOS 86 74   ALT 8* 7*   AST 23 13   BILITOT 0.4 1.0       CARDIAC PROFILE LAST 3 DAYS  Recent Labs   Lab 03/25/25  1605 03/26/25  0427   BNP 2,198* 657*       ENDOCRINE LAST 3 DAYS  Recent Labs   Lab 03/25/25  1632   TSH 0.749       LAST ARTERIAL BLOOD GAS  ABG  No results for input(s): "PH", "PO2", "PCO2", "HCO3", "BE" in the last 168 hours.    LAST 7 DAYS MICROBIOLOGY "   Microbiology Results (last 7 days)       ** No results found for the last 168 hours. **            MOST RECENT IMAGING  X-Ray Chest AP Portable  Narrative: EXAMINATION:  XR CHEST AP PORTABLE    CLINICAL HISTORY:  pulm edema;    FINDINGS:  Portable chest at 03:59 is compared to 03/25/2025 shows normal cardiomediastinal silhouette.    Improvement of the interstitial edema.  There are tiny pleural effusions.  There are no confluent infiltrates.  Pulmonary vasculature is normal. No acute osseous abnormality.  Impression: Improvement of the interstitial edema with tiny pleural effusions    Electronically signed by: Marlen Cunningham  Date:    03/26/2025  Time:    07:48      ECHOCARDIOGRAM RESULTS (last 5)  No results found for this or any previous visit.      CURRENT/PREVIOUS VISIT EKG  No results found for this or any previous visit.        ASSESSMENT/PLAN:     Active Hospital Problems    Diagnosis    *Gastrointestinal hemorrhage, unspecified gastrointestinal hemorrhage type    New onset atrial fibrillation    Symptomatic anemia    ABLA (acute blood loss anemia)    Gout    CANNON (dyspnea on exertion)    Elevated d-dimer    Essential hypertension    Benign prostatic hyperplasia    Stage 3 chronic kidney disease       ASSESSMENT & PLAN:   New onset atrial fibrillation  LBBB  Anemia  HTN  GI bleed  CKD    RECOMMENDATIONS:  New onset atrial fibrillation in setting of GI bleed  EKG showed controlled afib w/ LBBB and LAD  Remains in CAFib on telemetry  TSH was normal  Check & replace potassium & magnesium daily. Goal for potassium is >/= 4.0, and goal for magnesium is >/= 2.0.   2D Echocardiogram showed normal EF and mod TR + mild hypertension  Transition IV Lasix to p.o. tomorrow; monitor renal panel daily  Hospitalist states she was notified that GI is okay with initiating anticoagulation  Start Eliquis 2.5 mg b.i.d. for CVA prophylaxis (age >80 and cr. > 1.5)  Continue PPI Protonix as directed    Lucy Brown,  NP  Count includes the [facility]  Department of Cardiology  Date of Service: 3/28/25      I have personally interviewed and examined the patient, I have reviewed the Nurse Practitioner's history and physical, assessment, and plan. I agree with the findings and plan.        PATIENT DOING WELL.  HEART RATE IS CONTROLLED IS FLUID STATUS IS IMPROVED  TRANSITIONED TO P.O. LASIX  ELIQUIS 2.5 B.I.D.  HEART RATE CONTROL    WILL SIGN OFF RE-CONSULT P.R.N.  Dr. Thom M.D.  Count includes the [facility]  Department of Cardiology  Date of Service:3/28/25         [1]   Social History  Tobacco Use    Smoking status: Former     Current packs/day: 0.00     Types: Cigarettes     Quit date:      Years since quittin.2    Smokeless tobacco: Current     Types: Chew   Substance Use Topics    Alcohol use: Yes     Comment: occasionally    Drug use: Never   [2]   No current facility-administered medications on file prior to encounter.     Current Outpatient Medications on File Prior to Encounter   Medication Sig Dispense Refill    allopurinoL (ZYLOPRIM) 300 MG tablet TAKE 1 TABLET BY MOUTH EVERY DAY 90 tablet 0    aspirin 81 MG Chew Take 81 mg by mouth once daily.      cholestyramine, with sugar, 4 gram Powd Take 1 Scoop by mouth once daily. 3 each 3    hydroCHLOROthiazide (MICROZIDE) 12.5 mg capsule TAKE 1 CAPSULE BY MOUTH EVERY DAY 90 capsule 0    latanoprost 0.005 % ophthalmic solution 1 drop.      lisinopriL (PRINIVIL,ZESTRIL) 20 MG tablet Take 1 tablet (20 mg total) by mouth once daily. 90 tablet 3    polyethylene glycol (GLYCOLAX) 17 gram PwPk Take 17 g by mouth once daily. 100 packet 3    tamsulosin (FLOMAX) 0.4 mg Cap TAKE 1 CAPSULE BY MOUTH EVERY DAY 90 capsule 0    travoprost, benzalkonium, (TRAVATAN) 0.004 % ophthalmic solution 1 drop every evening.

## 2025-03-26 NOTE — ASSESSMENT & PLAN NOTE
Anemia is likely due to acute blood loss which was from GI bleed . Most recent hemoglobin and hematocrit are listed below.  Recent Labs     03/25/25  1547 03/25/25  1632   HGB 6.0* 5.6*   HCT 20.4* 18.8*     Plan  - Monitor serial CBC: Every 8 hours  - Transfuse PRBC if patient becomes hemodynamically unstable, symptomatic or H/H drops below 7/21.  - Patient has received 1 units of PRBCs on 3/25/2025  - Patient's anemia is currently worsening. Will continue current treatment  - patient received 1 unit packed red blood cell at [facility]  -recheck H&H post transfusion on admit  -give additional PRBC if hemoglobin less than 7

## 2025-03-26 NOTE — PLAN OF CARE
[facility]  Initial Discharge Assessment       Primary Care Provider: Anne Beebe MD    Admission Diagnosis: Gastrointestinal hemorrhage, unspecified gastrointestinal hemorrhage type [K92.2]    Admission Date: 3/25/2025  Expected Discharge Date: 3/28/2025    Transition of Care Barriers: None    Payor: MEDICARE / Plan: MEDICARE [employer] correction / Product Type: Government /     Extended Emergency Contact Information  Primary Emergency Contact: Sharif Edmondson  Address: [address] [place] [address] [facility]  Home Phone: [phone]  Mobile Phone: [phone]  Relation: Grandchild    Discharge Plan A: Home with family  Discharge Plan B: Home  DC assessment completed with patient at bedside. Verified information on facesheet as correct. Pt lives at listed address with his Grandson & his wife. Reports he has help if needed from daughter and Grandson. Reports NOK as Daughter Sanjuanita or Grandson Sharif.PCP is Dr Felton- reports last apt was last month. [facility] [address] Denies hh/hd/outpt services. DME he has a walker.Reports needs asst with activities. Family drives him to apts. Reports  Grandson or Daughter Will provide transportation home upon DC. Reports taking home medications as prescribed and can currently afford them. Verified insurance on file. Denies recent inpt stay in last 30 days.  DC plan is return home.    [id number] - [place], [place] - [address]  [address]  [place] [address]  Phone: [phone] Fax: [phone]    [id number] [place] - [address] [facility]  [address]  [place] [address]  Phone: [phone] Fax: [phone]      Initial Assessment (most recent)       Adult Discharge Assessment - 03/26/25 1516          Discharge Assessment    Assessment Type Discharge Planning Assessment     Confirmed/corrected address, phone number and insurance Yes     Confirmed Demographics  Correct on Facesheet     Source of Information patient;family   Grandson    When was your last doctors appointment? 02/25/25     Reason For Admission GI Hemorrhage     People in Home child(leonardo), adult   Grandchildren    Do you expect to return to your current living situation? Yes     Do you have help at home or someone to help you manage your care at home? Yes     Who are your caregiver(s) and their phone number(s)? Helga Edmondson [phone]     Prior to hospitilization cognitive status: Alert/Oriented     Current cognitive status: Alert/Oriented     Walking or Climbing Stairs Difficulty yes     Walking or Climbing Stairs ambulation difficulty, assistance 1 person     Dressing/Bathing Difficulty no     Do you have any problems with: --   Grandson goes    Equipment Currently Used at Home walker, rolling     Readmission within 30 days? No     Patient currently being followed by outpatient case management? No     Do you currently have service(s) that help you manage your care at home? No     Do you take prescription medications? Yes     Do you have prescription coverage? Yes     Do you have any problems affording any of your prescribed medications? No     Is the patient taking medications as prescribed? yes     Who is going to help you get home at discharge? Helga Olguin Or Daughter Sanjuanita     How do you get to doctors appointments? family or friend will provide     Are you on dialysis? No     Do you take coumadin? No     Discharge Plan A Home with family     Discharge Plan B Home     DME Needed Upon Discharge  none     Discharge Plan discussed with: Patient   Grandson    Transition of Care Barriers None

## 2025-03-26 NOTE — ASSESSMENT & PLAN NOTE
Hold Flomax given its alpha-1 properties while patient is hypovolemic  Consider once patient volume resuscitated

## 2025-03-26 NOTE — CARE UPDATE
Patient admitted by nocturnist this morning. Patient transferred from [facility] with symptomatic anemia, GIB and shortness of breath. H ehas CKD and could not get CTA chest. V/Q scan negative. He was transfused 1 unit of blood. Hemoglobin 6.9 to 8.4 today. Underwent EGD:  Impression:            - 2 cm hiatal hernia.                          - Gastric mucosal atrophy. Biopsied.                          - Chronic erosive duodenitis. Biopsied.                          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 Biopsied.                          - Erythematous mucosa in the antrum. Biopsied.   Recommendation:        - Await pathology results.                          - Use Protonix (pantoprazole) 40 mg PO daily                          indefinitely.     Iron studies suggest severe iron def anemia: iron 11, Sat Fe 3%.    BP soft- hyhpotensive  New onset afib   Cardiology recommending diuresis

## 2025-03-26 NOTE — TRANSFER OF CARE
"Anesthesia Transfer of Care Note    Patient: Vel Banda    Procedure(s) Performed: Procedure(s) (LRB):  EGD (ESOPHAGOGASTRODUODENOSCOPY) (N/A)    Patient location: GI    Anesthesia Type: general    Transport from OR: Transported from OR on room air with adequate spontaneous ventilation    Post pain: adequate analgesia    Post assessment: no apparent anesthetic complications and tolerated procedure well    Post vital signs: stable    Level of consciousness: awake, responds to stimulation and alert    Nausea/Vomiting: no nausea/vomiting    Complications: none    Transfer of care protocol was followed      Last vitals: Visit Vitals  /70   Pulse 102   Temp 36.4 °C (97.6 °F) (Oral)   Resp 16   Ht 5' 6" (1.676 m)   Wt 81.6 kg (180 lb)   SpO2 95%   BMI 29.05 kg/m²     "

## 2025-03-26 NOTE — PROVATION PATIENT INSTRUCTIONS
Discharge Summary/Instructions after an Endoscopic Procedure  Patient Name: Vel Banda  Patient MRN: [id number]  Patient YOB: 1927 Wednesday, March 26, 2025  Roxana Stuart MD  RESTRICTIONS:  During your procedure today, you received medications for sedation.  These   medications may affect your judgment, balance and coordination.  Therefore,   for 24 hours, you have the following restrictions:   - DO NOT drive a car, operate machinery, make legal/financial decisions,   sign important papers or drink alcohol.    ACTIVITY:  Today: no heavy lifting, straining or running due to procedural   sedation/anesthesia.  The following day: return to full activity including work.  DIET:  Eat and drink normally unless instructed otherwise.     TREATMENT FOR COMMON SIDE EFFECTS:  - Mild abdominal pain, nausea, belching, bloating or excessive gas:  rest,   eat lightly and use a heating pad.  - Sore Throat: treat with throat lozenges and/or gargle with warm salt   water.  - Because air was used during the procedure, expelling large amounts of air   from your rectum or belching is normal.  - If a bowel prep was taken, you may not have a bowel movement for 1-3 days.    This is normal.  SYMPTOMS TO WATCH FOR AND REPORT TO YOUR PHYSICIAN:  1. Abdominal pain or bloating, other than gas cramps.  2. Chest pain.  3. Back pain.  4. Signs of infection such as: chills or fever occurring within 24 hours   after the procedure.  5. Rectal bleeding, which would show as bright red, maroon, or black stools.   (A tablespoon of blood from the rectum is not serious, especially if   hemorrhoids are present.)  6. Vomiting.  7. Weakness or dizziness.  GO DIRECTLY TO THE NEAREST EMERGENCY ROOM IF YOU HAVE ANY OF THE FOLLOWING:      Difficulty breathing              Chills and/or fever over 101 F   Persistent vomiting and/or vomiting blood   Severe abdominal pain   Severe chest pain   Black, tarry stools   Bleeding- more than one  tablespoon   Any other symptom or condition that you feel may need urgent attention  Your doctor recommends these additional instructions:  If any biopsies were taken, your doctors clinic will contact you in 1 to 2   weeks with any results.  - Await pathology results.   - Use Protonix (pantoprazole) 40 mg PO daily indefinitely.   - [facility].   - Return to GI clinic in 1 month.   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  For questions, problems or results please call your physician - Roxana Stuart MD at Work:  [phone].  [facility], EMERGENCY ROOM PHONE NUMBER: [phone]  IF A COMPLICATION OR EMERGENCY SITUATION ARISES AND YOU ARE UNABLE TO REACH   YOUR PHYSICIAN - GO DIRECTLY TO THE EMERGENCY ROOM.  Roxana Stuart MD  3/26/2025 1:50:50 PM  This report has been verified and signed electronically.  Dear patient,  As a result of recent federal legislation (The Federal Cures Act), you may   receive lab or pathology results from your procedure in your [facility]   account before your physician is able to contact you. Your physician or   their representative will relay the results to you with their   recommendations at their soonest availability.  Thank you,  PROVATION

## 2025-03-26 NOTE — ED NOTES
[facility] to [place] [address]. Pt received 1Unit RBC. Transfusion completed on EMS arrival. Vss, breathing equal/unlabored on room air. Pt reports SOB on exertion. Denies cp/dizziness at this time. Pt able to use urinal at bedside without difficulty. Family brought pt belongings home.

## 2025-03-27 ENCOUNTER — CLINICAL SUPPORT (OUTPATIENT)
Dept: CARDIOLOGY | Facility: HOSPITAL | Age: OVER 89
End: 2025-03-27
Attending: EMERGENCY MEDICINE
Payer: MEDICARE

## 2025-03-27 LAB
ABSOLUTE EOSINOPHIL (SMH): 0.07 K/UL
ABSOLUTE MONOCYTE (SMH): 1.01 K/UL (ref 0.3–1)
ABSOLUTE NEUTROPHIL COUNT (SMH): 9.3 K/UL (ref 1.8–7.7)
ALBUMIN SERPL-MCNC: 3.3 G/DL (ref 3.5–5.2)
ALP SERPL-CCNC: 74 UNIT/L (ref 55–135)
ALT SERPL-CCNC: 7 UNIT/L (ref 10–44)
ANION GAP (SMH): 12 MMOL/L (ref 8–16)
AORTIC ROOT ANNULUS: 3.6 CM
AORTIC VALVE CUSP SEPERATION: 1.8 CM
APICAL FOUR CHAMBER EJECTION FRACTION: 41 %
APICAL TWO CHAMBER EJECTION FRACTION: 63 %
AST SERPL-CCNC: 14 UNIT/L (ref 10–40)
AV INDEX (PROSTH): 0.48
AV MEAN GRADIENT: 13 MMHG
AV PEAK GRADIENT: 23 MMHG
AV REGURGITATION PRESSURE HALF TIME: 528 MS
AV VALVE AREA BY VELOCITY RATIO: 1.1 CM²
AV VALVE AREA: 1.2 CM²
AV VELOCITY RATIO: 0.42
BASOPHILS # BLD AUTO: 0.03 K/UL
BASOPHILS NFR BLD AUTO: 0.3 %
BILIRUB SERPL-MCNC: 0.8 MG/DL (ref 0.1–1)
BSA FOR ECHO PROCEDURE: 1.99 M2
BUN SERPL-MCNC: 56 MG/DL (ref 10–30)
CALCIUM SERPL-MCNC: 8.6 MG/DL (ref 8.7–10.5)
CHLORIDE SERPL-SCNC: 109 MMOL/L (ref 95–110)
CO2 SERPL-SCNC: 19 MMOL/L (ref 23–29)
CREAT SERPL-MCNC: 2.1 MG/DL (ref 0.5–1.4)
CV ECHO LV RWT: 0.51 CM
DOP CALC AO PEAK VEL: 2.4 M/S
DOP CALC AO VTI: 36.8 CM
DOP CALC LVOT AREA: 2.5 CM2
DOP CALC LVOT DIAMETER: 1.8 CM
DOP CALC LVOT PEAK VEL: 1 M/S
DOP CALC LVOT STROKE VOLUME: 45.3 CM3
DOP CALC MV VTI: 14.9 CM
DOP CALCLVOT PEAK VEL VTI: 17.8 CM
E WAVE DECELERATION TIME: 130 MSEC
E/E' RATIO: 12 M/S
ECHO LV POSTERIOR WALL: 1.4 CM (ref 0.6–1.1)
ERYTHROCYTE [DISTWIDTH] IN BLOOD BY AUTOMATED COUNT: 19.1 % (ref 11.5–14.5)
FRACTIONAL SHORTENING: 21.8 % (ref 28–44)
GFR SERPLBLD CREATININE-BSD FMLA CKD-EPI: 28 ML/MIN/1.73/M2
GLUCOSE SERPL-MCNC: 98 MG/DL (ref 70–110)
HCT VFR BLD AUTO: 24.9 % (ref 40–54)
HGB BLD-MCNC: 7.8 GM/DL (ref 14–18)
IMM GRANULOCYTES # BLD AUTO: 0.07 K/UL (ref 0–0.04)
IMM GRANULOCYTES NFR BLD AUTO: 0.6 % (ref 0–0.5)
INTERVENTRICULAR SEPTUM: 1.3 CM (ref 0.6–1.1)
IVC DIAMETER: 1.59 CM
LEFT ATRIUM AREA SYSTOLIC (APICAL 2 CHAMBER): 12.6 CM2
LEFT ATRIUM AREA SYSTOLIC (APICAL 4 CHAMBER): 15.8 CM2
LEFT ATRIUM VOLUME INDEX MOD: 19 ML/M2
LEFT ATRIUM VOLUME MOD: 37 ML
LEFT INTERNAL DIMENSION IN SYSTOLE: 4.3 CM (ref 2.1–4)
LEFT VENTRICLE DIASTOLIC VOLUME INDEX: 75.38 ML/M2
LEFT VENTRICLE DIASTOLIC VOLUME: 147 ML
LEFT VENTRICLE END DIASTOLIC VOLUME APICAL 2 CHAMBER: 89.3 ML
LEFT VENTRICLE END DIASTOLIC VOLUME APICAL 4 CHAMBER: 116 ML
LEFT VENTRICLE END SYSTOLIC VOLUME APICAL 2 CHAMBER: 30.7 ML
LEFT VENTRICLE END SYSTOLIC VOLUME APICAL 4 CHAMBER: 44.3 ML
LEFT VENTRICLE MASS INDEX: 164.6 G/M2
LEFT VENTRICLE SYSTOLIC VOLUME INDEX: 43.1 ML/M2
LEFT VENTRICLE SYSTOLIC VOLUME: 84 ML
LEFT VENTRICULAR INTERNAL DIMENSION IN DIASTOLE: 5.5 CM (ref 3.5–6)
LEFT VENTRICULAR MASS: 320.9 G
LV LATERAL E/E' RATIO: 9.7 M/S
LV SEPTAL E/E' RATIO: 16.6 M/S
LVED V (TEICH): 147 ML
LVES V (TEICH): 84.4 ML
LVOT MG: 3 MMHG
LVOT MV: 0.67 CM/S
LYMPHOCYTES # BLD AUTO: 0.57 K/UL (ref 1–4.8)
MAGNESIUM SERPL-MCNC: 1.9 MG/DL (ref 1.6–2.6)
MCH RBC QN AUTO: 25.2 PG (ref 27–31)
MCHC RBC AUTO-ENTMCNC: 31.3 G/DL (ref 32–36)
MCV RBC AUTO: 81 FL (ref 82–98)
MV MEAN GRADIENT: 3 MMHG
MV PEAK E VEL: 1.16 M/S
MV PEAK GRADIENT: 6 MMHG
MV VALVE AREA BY CONTINUITY EQUATION: 3.04 CM2
NUCLEATED RBC (/100WBC) (SMH): 0 /100 WBC
OHS LV EJECTION FRACTION SIMPSONS BIPLANE MOD: 50 %
PISA AR MAX VEL: 2.95 M/S
PISA MRMAX VEL: 4.58 M/S
PISA TR MAX VEL: 3.3 M/S
PLATELET # BLD AUTO: 289 K/UL (ref 150–450)
PMV BLD AUTO: 10.5 FL (ref 9.2–12.9)
POTASSIUM SERPL-SCNC: 4.1 MMOL/L (ref 3.5–5.1)
PROT SERPL-MCNC: 5.9 GM/DL (ref 6–8.4)
PV MV: 1.23 M/S
PV PEAK GRADIENT: 13 MMHG
PV PEAK VELOCITY: 1.78 M/S
RBC # BLD AUTO: 3.09 M/UL (ref 4.6–6.2)
RELATIVE EOSINOPHIL (SMH): 0.6 % (ref 0–8)
RELATIVE LYMPHOCYTE (SMH): 5.1 % (ref 18–48)
RELATIVE MONOCYTE (SMH): 9.1 % (ref 4–15)
RELATIVE NEUTROPHIL (SMH): 84.3 % (ref 38–73)
RV TISSUE DOPPLER FREE WALL SYSTOLIC VELOCITY 1 (APICAL 4 CHAMBER VIEW): 10.7 CM/S
SODIUM SERPL-SCNC: 140 MMOL/L (ref 136–145)
TDI LATERAL: 0.12 M/S
TDI SEPTAL: 0.07 M/S
TDI: 0.1 M/S
TR MAX PG: 44 MMHG
TRICUSPID ANNULAR PLANE SYSTOLIC EXCURSION: 1.9 CM
WBC # BLD AUTO: 11.09 K/UL (ref 3.9–12.7)
Z-SCORE OF LEFT VENTRICULAR DIMENSION IN END DIASTOLE: -0.11
Z-SCORE OF LEFT VENTRICULAR DIMENSION IN END SYSTOLE: 1.81

## 2025-03-27 PROCEDURE — 25000003 PHARM REV CODE 250: Performed by: REGISTERED NURSE

## 2025-03-27 PROCEDURE — 99406 BEHAV CHNG SMOKING 3-10 MIN: CPT

## 2025-03-27 PROCEDURE — 80053 COMPREHEN METABOLIC PANEL: CPT | Performed by: REGISTERED NURSE

## 2025-03-27 PROCEDURE — 21400001 HC TELEMETRY ROOM

## 2025-03-27 PROCEDURE — 27000221 HC OXYGEN, UP TO 24 HOURS

## 2025-03-27 PROCEDURE — 36415 COLL VENOUS BLD VENIPUNCTURE: CPT | Performed by: REGISTERED NURSE

## 2025-03-27 PROCEDURE — 83735 ASSAY OF MAGNESIUM: CPT | Performed by: REGISTERED NURSE

## 2025-03-27 PROCEDURE — 99900031 HC PATIENT EDUCATION (STAT)

## 2025-03-27 PROCEDURE — 93306 TTE W/DOPPLER COMPLETE: CPT | Mod: 26,,, | Performed by: GENERAL PRACTICE

## 2025-03-27 PROCEDURE — 94799 UNLISTED PULMONARY SVC/PX: CPT

## 2025-03-27 PROCEDURE — 99900035 HC TECH TIME PER 15 MIN (STAT)

## 2025-03-27 PROCEDURE — 94761 N-INVAS EAR/PLS OXIMETRY MLT: CPT

## 2025-03-27 PROCEDURE — 63600175 PHARM REV CODE 636 W HCPCS: Performed by: NURSE PRACTITIONER

## 2025-03-27 PROCEDURE — 85025 COMPLETE CBC W/AUTO DIFF WBC: CPT | Performed by: REGISTERED NURSE

## 2025-03-27 PROCEDURE — 93306 TTE W/DOPPLER COMPLETE: CPT

## 2025-03-27 PROCEDURE — 63600175 PHARM REV CODE 636 W HCPCS: Performed by: REGISTERED NURSE

## 2025-03-27 RX ORDER — FUROSEMIDE 10 MG/ML
20 INJECTION INTRAMUSCULAR; INTRAVENOUS EVERY 12 HOURS
Status: DISCONTINUED | OUTPATIENT
Start: 2025-03-27 | End: 2025-03-29 | Stop reason: HOSPADM

## 2025-03-27 RX ADMIN — PANTOPRAZOLE SODIUM 40 MG: 40 INJECTION, POWDER, FOR SOLUTION INTRAVENOUS at 08:03

## 2025-03-27 RX ADMIN — FUROSEMIDE 20 MG: 10 INJECTION, SOLUTION INTRAMUSCULAR; INTRAVENOUS at 08:03

## 2025-03-27 RX ADMIN — ALLOPURINOL 300 MG: 300 TABLET ORAL at 08:03

## 2025-03-27 RX ADMIN — ACETAMINOPHEN 650 MG: 325 TABLET ORAL at 03:03

## 2025-03-27 NOTE — PROGRESS NOTES
03/27/25 1232        Wound 03/27/25 0128 Moisture associated dermatitis Right anterior Groin   Date First Assessed/Time First Assessed: 03/27/25 0128   Present on Original Admission: Yes  Primary Wound Type: Moisture associated dermatitis  Side: Right  Orientation: anterior  Location: Groin   Wound Image    Dressing Appearance Open to air   Drainage Amount None   Appearance Red  (rash)   Care Cleansed with:;Soap and water  (applied Miconazole 2% powder)        Wound 03/27/25 1232 Moisture associated dermatitis Left anterior Groin   Date First Assessed/Time First Assessed: 03/27/25 1232   Present on Original Admission: Yes  Primary Wound Type: Moisture associated dermatitis  Side: Left  Orientation: anterior  Location: Groin   Wound Image    Dressing Appearance Open to air   Drainage Amount None   Appearance Red  (dry brown crusty)   Periwound Area Intact   Care Cleansed with:;Soap and water   Dressing Applied  (miconazolle 2% powder)     Scrotum is red, Complete skin assessment.  Dr. Lance will see this afternoon

## 2025-03-27 NOTE — PLAN OF CARE
Problem: Adult Inpatient Plan of Care  Goal: Plan of Care Review  Outcome: Progressing  Goal: Optimal Comfort and Wellbeing  Outcome: Progressing     Problem: Fall Injury Risk  Goal: Absence of Fall and Fall-Related Injury  Outcome: Progressing     Problem: Gastrointestinal Bleeding  Goal: Hemostasis  Outcome: Progressing     Problem: Skin Injury Risk Increased  Goal: Skin Health and Integrity  Outcome: Progressing

## 2025-03-27 NOTE — NURSING
Orthostatic vital signs obtained. Pt became SOB with minimal exertion. See flow sheet for VS.   Patient is not pregnant (male or female)

## 2025-03-27 NOTE — RESPIRATORY THERAPY
03/26/25 1950   Patient Assessment/Suction   Level of Consciousness (AVPU) alert   PRE-TX-O2   Device (Oxygen Therapy) room air   SpO2 96 %   Pulse Oximetry Type Intermittent   $ Pulse Oximetry - Multiple Charge Pulse Oximetry - Multiple   Education   $ Education 15 min

## 2025-03-27 NOTE — CARE UPDATE
03/27/25 0702   PRE-TX-O2   Device (Oxygen Therapy) nasal cannula   $ Is the patient on Low Flow Oxygen? Yes   Flow (L/min) (Oxygen Therapy) 2   SpO2 95 %   Pulse Oximetry Type Intermittent   $ Pulse Oximetry - Multiple Charge Pulse Oximetry - Multiple   Pulse 89   Resp 20   Education   $ Education 15 min;Oxygen

## 2025-03-28 LAB
ABSOLUTE EOSINOPHIL (SMH): 0.11 K/UL
ABSOLUTE MONOCYTE (SMH): 1.04 K/UL (ref 0.3–1)
ABSOLUTE NEUTROPHIL COUNT (SMH): 8.2 K/UL (ref 1.8–7.7)
ALBUMIN SERPL-MCNC: 3.2 G/DL (ref 3.5–5.2)
ALP SERPL-CCNC: 67 UNIT/L (ref 55–135)
ALT SERPL-CCNC: 8 UNIT/L (ref 10–44)
ANION GAP (SMH): 6 MMOL/L (ref 8–16)
AST SERPL-CCNC: 16 UNIT/L (ref 10–40)
BASOPHILS # BLD AUTO: 0.02 K/UL
BASOPHILS NFR BLD AUTO: 0.2 %
BILIRUB SERPL-MCNC: 0.8 MG/DL (ref 0.1–1)
BUN SERPL-MCNC: 53 MG/DL (ref 10–30)
CALCIUM SERPL-MCNC: 8.6 MG/DL (ref 8.7–10.5)
CHLORIDE SERPL-SCNC: 108 MMOL/L (ref 95–110)
CO2 SERPL-SCNC: 25 MMOL/L (ref 23–29)
CREAT SERPL-MCNC: 2 MG/DL (ref 0.5–1.4)
ERYTHROCYTE [DISTWIDTH] IN BLOOD BY AUTOMATED COUNT: 19.8 % (ref 11.5–14.5)
GFR SERPLBLD CREATININE-BSD FMLA CKD-EPI: 30 ML/MIN/1.73/M2
GLUCOSE SERPL-MCNC: 99 MG/DL (ref 70–110)
HCT VFR BLD AUTO: 24.6 % (ref 40–54)
HGB BLD-MCNC: 7.7 GM/DL (ref 14–18)
IMM GRANULOCYTES # BLD AUTO: 0.08 K/UL (ref 0–0.04)
IMM GRANULOCYTES NFR BLD AUTO: 0.8 % (ref 0–0.5)
LYMPHOCYTES # BLD AUTO: 0.67 K/UL (ref 1–4.8)
MAGNESIUM SERPL-MCNC: 1.9 MG/DL (ref 1.6–2.6)
MCH RBC QN AUTO: 25.2 PG (ref 27–31)
MCHC RBC AUTO-ENTMCNC: 31.3 G/DL (ref 32–36)
MCV RBC AUTO: 81 FL (ref 82–98)
NUCLEATED RBC (/100WBC) (SMH): 0 /100 WBC
PLATELET # BLD AUTO: 265 K/UL (ref 150–450)
PMV BLD AUTO: 10.4 FL (ref 9.2–12.9)
POTASSIUM SERPL-SCNC: 4 MMOL/L (ref 3.5–5.1)
PROT SERPL-MCNC: 5.9 GM/DL (ref 6–8.4)
RBC # BLD AUTO: 3.05 M/UL (ref 4.6–6.2)
RELATIVE EOSINOPHIL (SMH): 1.1 % (ref 0–8)
RELATIVE LYMPHOCYTE (SMH): 6.6 % (ref 18–48)
RELATIVE MONOCYTE (SMH): 10.2 % (ref 4–15)
RELATIVE NEUTROPHIL (SMH): 81.1 % (ref 38–73)
SODIUM SERPL-SCNC: 139 MMOL/L (ref 136–145)
WBC # BLD AUTO: 10.16 K/UL (ref 3.9–12.7)

## 2025-03-28 PROCEDURE — 94761 N-INVAS EAR/PLS OXIMETRY MLT: CPT

## 2025-03-28 PROCEDURE — 85025 COMPLETE CBC W/AUTO DIFF WBC: CPT | Performed by: REGISTERED NURSE

## 2025-03-28 PROCEDURE — 80053 COMPREHEN METABOLIC PANEL: CPT | Performed by: REGISTERED NURSE

## 2025-03-28 PROCEDURE — 25000003 PHARM REV CODE 250: Performed by: REGISTERED NURSE

## 2025-03-28 PROCEDURE — 99221 1ST HOSP IP/OBS SF/LOW 40: CPT | Mod: ,,, | Performed by: FAMILY MEDICINE

## 2025-03-28 PROCEDURE — 63600175 PHARM REV CODE 636 W HCPCS: Performed by: NURSE PRACTITIONER

## 2025-03-28 PROCEDURE — 21400001 HC TELEMETRY ROOM

## 2025-03-28 PROCEDURE — 97165 OT EVAL LOW COMPLEX 30 MIN: CPT

## 2025-03-28 PROCEDURE — 99900031 HC PATIENT EDUCATION (STAT)

## 2025-03-28 PROCEDURE — 36415 COLL VENOUS BLD VENIPUNCTURE: CPT | Performed by: REGISTERED NURSE

## 2025-03-28 PROCEDURE — 83735 ASSAY OF MAGNESIUM: CPT | Performed by: REGISTERED NURSE

## 2025-03-28 PROCEDURE — 25000003 PHARM REV CODE 250: Performed by: NURSE PRACTITIONER

## 2025-03-28 PROCEDURE — 97161 PT EVAL LOW COMPLEX 20 MIN: CPT

## 2025-03-28 PROCEDURE — 63600175 PHARM REV CODE 636 W HCPCS: Performed by: REGISTERED NURSE

## 2025-03-28 RX ORDER — PANTOPRAZOLE SODIUM 40 MG/1
40 TABLET, DELAYED RELEASE ORAL DAILY
Status: DISCONTINUED | OUTPATIENT
Start: 2025-03-29 | End: 2025-03-29 | Stop reason: HOSPADM

## 2025-03-28 RX ADMIN — PANTOPRAZOLE SODIUM 40 MG: 40 INJECTION, POWDER, FOR SOLUTION INTRAVENOUS at 09:03

## 2025-03-28 RX ADMIN — FUROSEMIDE 20 MG: 10 INJECTION, SOLUTION INTRAMUSCULAR; INTRAVENOUS at 08:03

## 2025-03-28 RX ADMIN — ALLOPURINOL 300 MG: 300 TABLET ORAL at 08:03

## 2025-03-28 RX ADMIN — FUROSEMIDE 20 MG: 10 INJECTION, SOLUTION INTRAMUSCULAR; INTRAVENOUS at 10:03

## 2025-03-28 RX ADMIN — ACETAMINOPHEN 650 MG: 325 TABLET ORAL at 07:03

## 2025-03-28 RX ADMIN — APIXABAN 2.5 MG: 2.5 TABLET, FILM COATED ORAL at 10:03

## 2025-03-28 RX ADMIN — APIXABAN 2.5 MG: 2.5 TABLET, FILM COATED ORAL at 01:03

## 2025-03-28 NOTE — PLAN OF CARE
Met with Sharif Edmondson (pt's grandson)  to review discharge recommendation of HH and they are agreeable to plan.    Patient/family provided with a list of facilities in-network with patient's payor plan. Providers that are owned, operated, or affiliated with [facility] are included on the list.     Notified that referrals will be sent to the below listed facilities from in-network list based on proximity to home/family support:   [facility]      Patient/family instructed to identify preference.    Preferred Facility: (if more than 1, listed in order of descending preference)  [facility]    If an additional preferred facility not listed above is identified, additional referral to be sent. If above facilities unable to accept, will send additional referrals to in-network providers.      SW sent referral to [facility] with pt's NADEEN of tomorrow, 03/29/2025. Pt does not have HH orders at the time referral was sent. Pt needs HH orders placed and sent to [facility] before d/c.        03/28/25 1500   Discharge Reassessment   Assessment Type Discharge Planning Reassessment   Discharge Plan discussed with: Adult children   Discharge Plan A Home Health   Discharge Plan B Home Health

## 2025-03-28 NOTE — ASSESSMENT & PLAN NOTE
D-dimer 1.11 which could be contributed by acute GI bleed  Unable to CTA chest secondary to elevated creatinine    Low priority V/Q scan ordered for PE rule out - negative

## 2025-03-28 NOTE — ASSESSMENT & PLAN NOTE
Patient's hemorrhage is due to gastrointestinal bleed, this bleeding is not associated with a medication or a coagulopathy. Patients most recent Hgb, Hct, platelets, and INR are listed below.  Recent Labs     03/26/25  1016 03/27/25  0427 03/28/25  0409   HGB 8.4* 7.8* 7.7*   HCT 27.4* 24.9* 24.6*    289 265     Plan  - daily h/h  - Will monitor and correct any coagulation defects  - Will transfuse if Hgb is <7g/dl (<8g/dl in cases of active ACS) or if patient has rapid bleeding leading to hemodynamic instability  - consult GI  -started on PPI b.i.d.-  now Gi recs : daily   -occult blood positive  -no antiplatelets, anticoagulants, NSAIDs  -completed EGD

## 2025-03-28 NOTE — SUBJECTIVE & OBJECTIVE
Interval History: patient drowsy on visit, son agrees to palliative care visit     Review of Systems   Constitutional:  Positive for activity change and fatigue.   Respiratory:  Positive for cough and shortness of breath.    Gastrointestinal:  Positive for blood in stool.   Musculoskeletal:  Positive for arthralgias and back pain.   Neurological:  Positive for tremors and weakness.   Psychiatric/Behavioral:  Positive for agitation. The patient is nervous/anxious.      Objective:     Vital Signs (Most Recent):  Temp: 97.4 °F (36.3 °C) (03/28/25 1510)  Pulse: 102 (03/28/25 1510)  Resp: 16 (03/28/25 1510)  BP: (!) 112/56 (03/28/25 1510)  SpO2: 98 % (03/28/25 1510) Vital Signs (24h Range):  Temp:  [97.4 °F (36.3 °C)-98.5 °F (36.9 °C)] 97.4 °F (36.3 °C)  Pulse:  [] 102  Resp:  [16-22] 16  SpO2:  [93 %-100 %] 98 %  BP: (107-129)/(56-71) 112/56     Weight: 85 kg (187 lb 6.3 oz)  Body mass index is 30.25 kg/m².    Intake/Output Summary (Last 24 hours) at 3/28/2025 1630  Last data filed at 3/28/2025 1300  Gross per 24 hour   Intake 360 ml   Output 1500 ml   Net -1140 ml         Physical Exam  Constitutional:       General: He is not in acute distress.     Appearance: He is ill-appearing.   HENT:      Head: Normocephalic and atraumatic.      Mouth/Throat:      Mouth: Mucous membranes are moist.      Pharynx: Oropharynx is clear.   Pulmonary:      Effort: No respiratory distress.      Breath sounds: Rhonchi present.   Abdominal:      General: There is no distension.      Tenderness: There is no abdominal tenderness.   Musculoskeletal:         General: No swelling.   Skin:     General: Skin is warm and dry.      Capillary Refill: Capillary refill takes 2 to 3 seconds.   Neurological:      Mental Status: He is alert and oriented to person, place, and time. Mental status is at baseline.               Significant Labs: All pertinent labs within the past 24 hours have been reviewed.  Troponin: No results for input(s):  ""TROPONINI", "TROPONINIHS" in the last 48 hours.    Significant Imaging: I have reviewed all pertinent imaging results/findings within the past 24 hours.  "

## 2025-03-28 NOTE — PROGRESS NOTES
[facility] Medicine  Progress Note    Patient Name: Vel Banda  MRN: [id number]  Patient Class: IP- Inpatient   Admission Date: 3/25/2025  Length of Stay: 3 days  Attending Physician: Kiera Ross MD  Primary Care Provider: Thalia Felton MD        Subjective     Principal Problem:Gastrointestinal hemorrhage, unspecified gastrointestinal hemorrhage type        HPI:  Patient is a 89-year-old male history of CKD 3, HTN, HLD, gout presented to ED at [facility] with shortness of breath over the last month, progressively worsening dyspnea on exertion over the last week.  Patient denies chest pain, hemoptysis, orthopnea, PND, syncope, rectal bleeding..  In ED labwork remarkable for hemoglobin of 6.0 with a baseline of 15 last taken 18 months ago.  Troponin negative x2, BNP EKG AFib with LBBB, rate controlled  Chemistry shows a creatinine of 2.1 with patients baseline 1.5.  Patient has had iron studies which shows low iron normal TIBC, low ferritin low iron sat and elevated reticulocyte count.  Fecal occult positive for blood.  D-dimer elevated at 1.1 which could be related to GI bleed., patient has a new onset AFib.  Patient was unable to have CTA chest secondary to elevated creatinine.  Patient was transfuse 1 unit packed red blood cells at prior facility.  GI was made aware.  Patient was given 80 mg Protonix bolus And given dose of 20 mg of Lasix.  Chest x-ray shows pulmonary edema.  Patient tolerating room air.  Patient transferred to [facility] for GI eval.  On exam patient only complains of dyspnea on exertion and fatigue.  Vitals are all stable.  AFib is controlled rate.  Patient will be admitted to Hospital Medicine will consult GI and Cardiology.    Overview/Hospital Course:  Patient transferred from [facility] with GI request for GI evaluation. Symptomatic anemia, improved with 1 unit PRBC. Underwent EGD:  Impression:            - 2 cm hiatal hernia.                           - Gastric mucosal atrophy. Biopsied.                          - Chronic erosive duodenitis. Biopsied.                          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 Biopsied.                          - Erythematous mucosa in the antrum. Biopsied.   Recommendation:        - Await pathology results.     PPI daily continued.  Cardiology consulted for atrial fibrillation and decompensated CHF. Patient given lasix but still c/o dyspnea. ECHO pending and CXR didn't show improvement. REpeating lasix dose and CXR in am.     Palliative care met with patient and son. Confirmed DNR.    Resuming anticoagulant and monitoring anemia closely     Interval History: patient drowsy on visit, son agrees to palliative care visit     Review of Systems   Constitutional:  Positive for activity change and fatigue.   Respiratory:  Positive for cough and shortness of breath.    Gastrointestinal:  Positive for blood in stool.   Musculoskeletal:  Positive for arthralgias and back pain.   Neurological:  Positive for tremors and weakness.   Psychiatric/Behavioral:  Positive for agitation. The patient is nervous/anxious.      Objective:     Vital Signs (Most Recent):  Temp: 97.4 °F (36.3 °C) (03/28/25 1510)  Pulse: 102 (03/28/25 1510)  Resp: 16 (03/28/25 1510)  BP: (!) 112/56 (03/28/25 1510)  SpO2: 98 % (03/28/25 1510) Vital Signs (24h Range):  Temp:  [97.4 °F (36.3 °C)-98.5 °F (36.9 °C)] 97.4 °F (36.3 °C)  Pulse:  [] 102  Resp:  [16-22] 16  SpO2:  [93 %-100 %] 98 %  BP: (107-129)/(56-71) 112/56     Weight: 85 kg (187 lb 6.3 oz)  Body mass index is 30.25 kg/m².    Intake/Output Summary (Last 24 hours) at 3/28/2025 1630  Last data filed at 3/28/2025 1300  Gross per 24 hour   Intake 360 ml   Output 1500 ml   Net -1140 ml         Physical Exam  Constitutional:       General: He is not in acute distress.     Appearance: He is ill-appearing.   HENT:      Head: Normocephalic  "and atraumatic.      Mouth/Throat:      Mouth: Mucous membranes are moist.      Pharynx: Oropharynx is clear.   Pulmonary:      Effort: No respiratory distress.      Breath sounds: Rhonchi present.   Abdominal:      General: There is no distension.      Tenderness: There is no abdominal tenderness.   Musculoskeletal:         General: No swelling.   Skin:     General: Skin is warm and dry.      Capillary Refill: Capillary refill takes 2 to 3 seconds.   Neurological:      Mental Status: He is alert and oriented to person, place, and time. Mental status is at baseline.               Significant Labs: All pertinent labs within the past 24 hours have been reviewed.  Troponin: No results for input(s): "TROPONINI", "TROPONINIHS" in the last 48 hours.    Significant Imaging: I have reviewed all pertinent imaging results/findings within the past 24 hours.      Assessment & Plan  Gastrointestinal hemorrhage, unspecified gastrointestinal hemorrhage type  Patient's hemorrhage is due to gastrointestinal bleed, this bleeding is not associated with a medication or a coagulopathy. Patients most recent Hgb, Hct, platelets, and INR are listed below.  Recent Labs     03/26/25  1016 03/27/25  0427 03/28/25  0409   HGB 8.4* 7.8* 7.7*   HCT 27.4* 24.9* 24.6*    289 265     Plan  - daily h/h  - Will monitor and correct any coagulation defects  - Will transfuse if Hgb is <7g/dl (<8g/dl in cases of active ACS) or if patient has rapid bleeding leading to hemodynamic instability  - consult GI  -started on PPI b.i.d.-  now Gi recs : daily   -occult blood positive  -no antiplatelets, anticoagulants, NSAIDs  -completed EGD  Benign prostatic hyperplasia  Hold Flomax given its alpha-1 properties while patient is hypovolemic  Consider once patient volume resuscitated  Essential hypertension  Patient's blood pressure range in the last 24 hours was: BP  Min: 107/71  Max: 129/68.The patient's inpatient anti-hypertensive regimen is listed " below:  Current Antihypertensives  furosemide injection 20 mg, Every 12 hours, Intravenous    Plan  - BP is controlled, no changes needed to their regimen  - given patient's acute anemia, we will hold any antihypertensives until patient with better volume status  Stage 3 chronic kidney disease  Creatine stable for now. BMP reviewed- noted Estimated Creatinine Clearance: 21.6 mL/min (A) (based on SCr of 2 mg/dL (H)). according to latest data. Based on current GFR, CKD stage is stage 3 - GFR 30-59.  Monitor UOP and serial BMP and adjust therapy as needed. Renally dose meds. Avoid nephrotoxic medications and procedures..    Worsened creatinine likely secondary to acute anemia, additionally with lisinopril on board.  Hold home lisinopril and HCTZ  Recheck labwork post transfusion  New onset atrial fibrillation  Patient has paroxysmal (<7 days) atrial fibrillation. Patient is currently in atrial fibrillation. [id number] Score: 2. The patients heart rate in the last 24 hours is as follows:  Pulse  Min: 63  Max: 106     Antiarrhythmics       Anticoagulants  apixaban tablet 2.5 mg, 2 times daily, Oral    Plan  - Replete lytes with a goal of K>4, Mg >2  - Patient is not anticoagulated due to acute GI bleed  - Patient's afib is currently controlled  - given patient's acute symptomatic anemia, hold rate control medicine, we will treat with p.r.n. IV meds if needed until patient's hemoglobin and hematocrit in a range not needing transfusion.    Symptomatic anemia  Anemia is likely due to acute blood loss which was from GI bleed . Most recent hemoglobin and hematocrit are listed below.  Recent Labs     03/26/25  1016 03/27/25  0427 03/28/25  0409   HGB 8.4* 7.8* 7.7*   HCT 27.4* 24.9* 24.6*     Plan  - Monitor serial CBC: Every 8 hours  - Transfuse PRBC if patient becomes hemodynamically unstable, symptomatic or H/H drops below 7/21.  - Patient has received 1 units of PRBCs on 3/25/2025  - Patient's anemia is currently  worsening. Will continue current treatment  - recheck stat H&H on arrival, transfuse if hemoglobin less than 7.  ABLA (acute blood loss anemia)  Anemia is likely due to acute blood loss which was from GI bleed . Most recent hemoglobin and hematocrit are listed below.  Recent Labs     03/26/25  1016 03/27/25  0427 03/28/25  0409   HGB 8.4* 7.8* 7.7*   HCT 27.4* 24.9* 24.6*     Plan  - Monitor serial CBC: Every 8 hours  - Transfuse PRBC if patient becomes hemodynamically unstable, symptomatic or H/H drops below 7/21.  - Patient has received 1 units of PRBCs on 3/25/2025  - Patient's anemia is currently worsening. Will continue current treatment  - patient received 1 unit packed red blood cell at [facility]  -recheck H&H post transfusion on admit  -give additional PRBC if hemoglobin less than 7    Post transfusion increased hemoglobin 8.4, dropped again to 7.8  Gout  Resume home dose allopurinol, no acute gout pain  CANNON (dyspnea on exertion)  Dyspnea on exertion likely multifactorial in the setting of low H&H, new onset atrial fibrillation.    Troponin negative x2,   BNP elevated.    Chest x-ray with pulmonary edema  Atrial fibrillation rate controlled    Echocardiogram ordered  Transfuse blood  Lasix x 1 dose   Elevated d-dimer  D-dimer 1.11 which could be contributed by acute GI bleed  Unable to CTA chest secondary to elevated creatinine    Low priority V/Q scan ordered for PE rule out - negative       VTE Risk Mitigation (From admission, onward)           Ordered     apixaban tablet 2.5 mg  2 times daily         03/28/25 1228     Reason for No Pharmacological VTE Prophylaxis  Once        Question:  Reasons:  Answer:  Active Bleeding    03/25/25 2348     IP VTE HIGH RISK PATIENT  Once         03/25/25 2348     Place sequential compression device  Until discontinued         03/25/25 2348                    Discharge Planning   NADEEN: 3/29/2025     Code Status: DNR   Medical Readiness for Discharge Date:    Discharge Plan A: Home Health                Please place Justification for DME        Kiera Ross MD  Department of Hospital Medicine   [facility]

## 2025-03-28 NOTE — PROGRESS NOTES
[facility] Medicine  Progress Note    Patient Name: Vel Banda  MRN: [id number]  Patient Class: IP- Inpatient   Admission Date: 3/25/2025  Length of Stay: 2 days  Attending Physician: Kiera Ross MD  Primary Care Provider: Thalia Felton MD        Subjective     Principal Problem:Gastrointestinal hemorrhage, unspecified gastrointestinal hemorrhage type        HPI:  Patient is a 89-year-old male history of CKD 3, HTN, HLD, gout presented to ED at [facility] with shortness of breath over the last month, progressively worsening dyspnea on exertion over the last week.  Patient denies chest pain, hemoptysis, orthopnea, PND, syncope, rectal bleeding..  In ED labwork remarkable for hemoglobin of 6.0 with a baseline of 15 last taken 18 months ago.  Troponin negative x2, BNP EKG AFib with LBBB, rate controlled  Chemistry shows a creatinine of 2.1 with patients baseline 1.5.  Patient has had iron studies which shows low iron normal TIBC, low ferritin low iron sat and elevated reticulocyte count.  Fecal occult positive for blood.  D-dimer elevated at 1.1 which could be related to GI bleed., patient has a new onset AFib.  Patient was unable to have CTA chest secondary to elevated creatinine.  Patient was transfuse 1 unit packed red blood cells at prior facility.  GI was made aware.  Patient was given 80 mg Protonix bolus And given dose of 20 mg of Lasix.  Chest x-ray shows pulmonary edema.  Patient tolerating room air.  Patient transferred to [facility] for GI eval.  On exam patient only complains of dyspnea on exertion and fatigue.  Vitals are all stable.  AFib is controlled rate.  Patient will be admitted to Hospital Medicine will consult GI and Cardiology.    Overview/Hospital Course:  Patient transferred from [facility] with GI request for GI evaluation. Symptomatic anemia, improved with 1 unit PRBC. Underwent EGD:  Impression:            - 2 cm hiatal hernia.                           - Gastric mucosal atrophy. Biopsied.                          - Chronic erosive duodenitis. Biopsied.                          - [place]-colored mucosa classified as Crandall's                          stage C2-M2 per [place] criteria. Biopsied.                          - Erythematous mucosa in the antrum. Biopsied.   Recommendation:        - Await pathology results.     PPI daily continued.  Cardiology consulted for atrial fibrillation and decompensated CHF. Patient given lasix but still c/o dyspnea. ECHO pending and CXR didn't show improvement. REpeating lasix dose and CXR in am.     Interval History: patient agitated because he wants to go home; cardiology to re-eval for dc home tomorrow     Review of Systems   Constitutional:  Positive for activity change and fatigue.   Respiratory:  Positive for cough and shortness of breath.    Gastrointestinal:  Positive for blood in stool.   Musculoskeletal:  Positive for arthralgias and back pain.   Neurological:  Positive for tremors and weakness.   Psychiatric/Behavioral:  Positive for agitation. The patient is nervous/anxious.      Objective:     Vital Signs (Most Recent):  Temp: 97.9 °F (36.6 °C) (03/27/25 1926)  Pulse: 94 (03/27/25 1926)  Resp: 17 (03/27/25 1615)  BP: 117/65 (03/27/25 1926)  SpO2: 98 % (03/27/25 1926) Vital Signs (24h Range):  Temp:  [97.7 °F (36.5 °C)-98.4 °F (36.9 °C)] 97.9 °F (36.6 °C)  Pulse:  [] 94  Resp:  [16-22] 17  SpO2:  [95 %-98 %] 98 %  BP: (108-137)/(53-76) 117/65     Weight: 85 kg (187 lb 6.3 oz)  Body mass index is 30.25 kg/m².    Intake/Output Summary (Last 24 hours) at 3/27/2025 1946  Last data filed at 3/27/2025 1425  Gross per 24 hour   Intake 460 ml   Output 475 ml   Net -15 ml         Physical Exam  Constitutional:       General: He is not in acute distress.     Appearance: He is ill-appearing.   HENT:      Head: Normocephalic and atraumatic.      Mouth/Throat:      Mouth: Mucous membranes are moist.      Pharynx: Oropharynx  "is clear.   Pulmonary:      Effort: No respiratory distress.      Breath sounds: Rhonchi present.   Abdominal:      General: There is no distension.      Tenderness: There is no abdominal tenderness.   Musculoskeletal:         General: No swelling.   Skin:     General: Skin is warm and dry.      Capillary Refill: Capillary refill takes 2 to 3 seconds.   Neurological:      Mental Status: He is alert and oriented to person, place, and time. Mental status is at baseline.               Significant Labs: All pertinent labs within the past 24 hours have been reviewed.  Troponin: No results for input(s): "TROPONINI", "TROPONINIHS" in the last 48 hours.    Significant Imaging: I have reviewed all pertinent imaging results/findings within the past 24 hours.      Assessment & Plan  Gastrointestinal hemorrhage, unspecified gastrointestinal hemorrhage type  Patient's hemorrhage is due to gastrointestinal bleed, this bleeding is not associated with a medication or a coagulopathy. Patients most recent Hgb, Hct, platelets, and INR are listed below.  Recent Labs     03/25/25  1632 03/26/25  0055 03/26/25  1016 03/27/25  0427   HGB 5.6* 6.9* 8.4* 7.8*   HCT 18.8* 22.4* 27.4* 24.9*     --  300 289     Plan  - Will trend hemoglobin/hematocrit Every 8 hours  - Will monitor and correct any coagulation defects  - Will transfuse if Hgb is <7g/dl (<8g/dl in cases of active ACS) or if patient has rapid bleeding leading to hemodynamic instability  - consult GI  -started on PPI b.i.d.- Gi recs : daily   -occult blood positive  -no antiplatelets, anticoagulants, NSAIDs  -completed EGD  Benign prostatic hyperplasia  Hold Flomax given its alpha-1 properties while patient is hypovolemic  Consider once patient volume resuscitated  Essential hypertension  Patient's blood pressure range in the last 24 hours was: BP  Min: 108/58  Max: 137/74.The patient's inpatient anti-hypertensive regimen is listed below:  Current " Antihypertensives  furosemide injection 20 mg, Every 12 hours, Intravenous    Plan  - BP is controlled, no changes needed to their regimen  - given patient's acute anemia, we will hold any antihypertensives until patient with better volume status  Stage 3 chronic kidney disease  Creatine stable for now. BMP reviewed- noted Estimated Creatinine Clearance: 20.6 mL/min (A) (based on SCr of 2.1 mg/dL (H)). according to latest data. Based on current GFR, CKD stage is stage 3 - GFR 30-59.  Monitor UOP and serial BMP and adjust therapy as needed. Renally dose meds. Avoid nephrotoxic medications and procedures..    Worsened creatinine likely secondary to acute anemia, additionally with lisinopril on board.  Hold home lisinopril and HCTZ  Recheck labwork post transfusion  New onset atrial fibrillation  Patient has paroxysmal (<7 days) atrial fibrillation. Patient is currently in atrial fibrillation. [id number] Score: 2. The patients heart rate in the last 24 hours is as follows:  Pulse  Min: 65  Max: 109     Antiarrhythmics       Anticoagulants       Plan  - Replete lytes with a goal of K>4, Mg >2  - Patient is not anticoagulated due to acute GI bleed  - Patient's afib is currently controlled  - given patient's acute symptomatic anemia, hold rate control medicine, we will treat with p.r.n. IV meds if needed until patient's hemoglobin and hematocrit in a range not needing transfusion.    Symptomatic anemia  Anemia is likely due to acute blood loss which was from GI bleed . Most recent hemoglobin and hematocrit are listed below.  Recent Labs     03/26/25  0055 03/26/25  1016 03/27/25  0427   HGB 6.9* 8.4* 7.8*   HCT 22.4* 27.4* 24.9*     Plan  - Monitor serial CBC: Every 8 hours  - Transfuse PRBC if patient becomes hemodynamically unstable, symptomatic or H/H drops below 7/21.  - Patient has received 1 units of PRBCs on 3/25/2025  - Patient's anemia is currently worsening. Will continue current treatment  - recheck stat H&H on  arrival, transfuse if hemoglobin less than 7.  ABLA (acute blood loss anemia)  Anemia is likely due to acute blood loss which was from GI bleed . Most recent hemoglobin and hematocrit are listed below.  Recent Labs     03/26/25  0055 03/26/25  1016 03/27/25  0427   HGB 6.9* 8.4* 7.8*   HCT 22.4* 27.4* 24.9*     Plan  - Monitor serial CBC: Every 8 hours  - Transfuse PRBC if patient becomes hemodynamically unstable, symptomatic or H/H drops below 7/21.  - Patient has received 1 units of PRBCs on 3/25/2025  - Patient's anemia is currently worsening. Will continue current treatment  - patient received 1 unit packed red blood cell at [facility]  -recheck H&H post transfusion on admit  -give additional PRBC if hemoglobin less than 7    Post transfusion increased hemoglobin 8.4, dropped again to 7.8  Gout  Resume home dose allopurinol, no acute gout pain  CANNON (dyspnea on exertion)  Dyspnea on exertion likely multifactorial in the setting of low H&H, new onset atrial fibrillation.    Troponin negative x2,   BNP elevated.    Chest x-ray with pulmonary edema  Atrial fibrillation rate controlled    Echocardiogram ordered  Transfuse blood  Lasix x 1 dose   Elevated d-dimer  D-dimer 1.11 which could be contributed by acute GI bleed  Unable to CTA chest secondary to elevated creatinine    Low priority V/Q scan ordered for PE rule out       VTE Risk Mitigation (From admission, onward)           Ordered     Reason for No Pharmacological VTE Prophylaxis  Once        Question:  Reasons:  Answer:  Active Bleeding    03/25/25 2348     IP VTE HIGH RISK PATIENT  Once         03/25/25 2348     Place sequential compression device  Until discontinued         03/25/25 2348                    Discharge Planning   NADEEN: 3/28/2025     Code Status: Full Code   Medical Readiness for Discharge Date:   Discharge Plan A: Home with family                Please place Justification for DME        Kiera Ross MD  Department of Hospital  Medicine   [facility]

## 2025-03-28 NOTE — ASSESSMENT & PLAN NOTE
Anemia is likely due to acute blood loss which was from GI bleed. Most recent hemoglobin and hematocrit are listed below.  Recent Labs     03/26/25  0055 03/26/25  1016 03/27/25  0427   HGB 6.9* 8.4* 7.8*   HCT 22.4* 27.4* 24.9*     Plan  - Monitor serial CBC: Every 8 hours  - Transfuse PRBC if patient becomes hemodynamically unstable, symptomatic or H/H drops below 7/21.  - Patient has received 1 units of PRBCs on 3/25/2025  - Patient's anemia is currently worsening. Will continue current treatment  - recheck stat H&H on arrival, transfuse if hemoglobin less than 7.

## 2025-03-28 NOTE — ASSESSMENT & PLAN NOTE
Creatine stable for now. BMP reviewed- noted Estimated Creatinine Clearance: 20.6 mL/min (A) (based on SCr of 2.1 mg/dL (H)). according to latest data. Based on current GFR, CKD stage is stage 3 - GFR 30-59.  Monitor UOP and serial BMP and adjust therapy as needed. Renally dose meds. Avoid nephrotoxic medications and procedures..    Worsened creatinine likely secondary to acute anemia, additionally with lisinopril on board.  Hold home lisinopril and HCTZ  Recheck labwork post transfusion

## 2025-03-28 NOTE — PT/OT/SLP EVAL
Occupational Therapy   Evaluation    Name: Vel Banda  MRN: [id number]  Admitting Diagnosis: Gastrointestinal hemorrhage, unspecified gastrointestinal hemorrhage type  Recent Surgery: Procedure(s) (LRB):  EGD (ESOPHAGOGASTRODUODENOSCOPY) (N/A) 2 Days Post-Op    Recommendations:     Discharge Recommendations: Low Intensity Therapy  Discharge Equipment Recommendations:  none  Barriers to discharge:  none    Assessment:     Vel Banda is a 89 y.o. male with a medical diagnosis of Gastrointestinal hemorrhage, unspecified gastrointestinal hemorrhage type.   Performance deficits affecting function: weakness, impaired endurance, impaired self care skills, impaired functional mobility, gait instability, impaired balance, impaired cardiopulmonary response to activity.      Pt presented supine; he was reluctantly agreeable to OT evaluation; family report patient has had a decline in strength/independence with ADLs in the past two weeks.  He required Min A for bed mobility, transfers, and seated ADLs today.     Rehab Prognosis: Fair; patient would benefit from acute skilled OT services to address these deficits and reach maximum level of function.       Plan:     Patient to be seen 5 x/week to address the above listed problems via self-care/home management, therapeutic activities, therapeutic exercises  Plan of Care Expires: 04/27/25  Plan of Care Reviewed with: patient, grandchild(leonardo)    Subjective     Chief Complaint: none  Patient/Family Comments/goals: pt was eager to go home    Occupational Profile:  Living Environment: Pt lives with his daughter and grandson; pt currently uses a tub/shower with glass doors; pt family states they have plans to renovate this bathroom soon to be more safe/accessible for the patient   Previous level of function: Prior to the last two weeks, pt was independent with ADLs and functional mobility doing household tasks like taking out the trash; over the past two weeks, he has been more  weak/short of breath with activity and has been using the rolling walker/needing some assistance with ADLs.     Equipment Used at Home: walker, rolling  Assistance upon Discharge: family assists as needed     Pain/Comfort:  Pain Rating 1: 0/10  Pain Rating Post-Intervention 1: 0/10    Objective:     Communicated with: nurse prior to session.  Patient found supine with telemetry upon OT entry to room.    General Precautions: Standard, fall  Orthopedic Precautions: N/A  Braces: N/A  Respiratory Status: Room air    Occupational Performance:    Bed Mobility:    Patient completed Supine to Sit with minimum assistance    Functional Mobility/Transfers:  Patient completed Sit <> Stand Transfer with minimum assistance  with  hand held assistance  Patient completed Bed <> Chair Transfer using Stand Pivot technique with minimum assistance with hand held assistance    Activities of Daily Living:  Upper Body Dressing: minimum assistance seated in the bedside chair    Cognitive/Visual Perceptual:  Cognitive/Psychosocial Skills:     -       Follows Commands/attention:Follows multistep  commands    Physical Exam:  Upper Extremity Range of Motion:     -       Right Upper Extremity: WFL  -       Left Upper Extremity: WFL  Upper Extremity Strength:    -       Right Upper Extremity: WFL  -       Left Upper Extremity: WFL   Strength:    -       Right Upper Extremity: WFL  -       Left Upper Extremity: WFL  Fine Motor Coordination:    -       Intact     AMPAC 6 Click ADL:  AMPAC Total Score: 17    Treatment & Education:  Therapist provided facilitation and instruction of proper body mechanics and fall prevention strategies during tasks listed above.   Instructed patient to sit in bedside chair as tolerated daily to increase OOB/activity tolerance.   Instructed patient to use call light to have nursing staff assist with needs/transfers.   Discussed OT POC and answered all questions within OT scope of practice.    Patient left up in  chair with all lines intact, call button in reach, and family present    GOALS:   Multidisciplinary Problems       Occupational Therapy Goals          Problem: Occupational Therapy    Goal Priority Disciplines Outcome Interventions   Occupational Therapy Goal     OT, PT/OT     Description: Goals to be met by: 4/28/25     Patient will increase functional independence with ADLs by performing:    UE Dressing with Set-up Assistance.  LE Dressing with Minimal Assistance.  Grooming while seated with Supervision.  Toileting from toilet with Minimal Assistance for hygiene and clothing management.   Toilet transfer to toilet with Minimal Assistance.                         DME Justifications:  No DME recommended requiring DME justifications    History:     Past Medical History:   Diagnosis Date    Actinic keratosis     Benign prostatic hyperplasia     Chronic kidney disease     Stage 3     Fibromyositis     Gouty arthropathy     Hyperlipidemia     Hypertension     Osteoarthritis of knee          Past Surgical History:   Procedure Laterality Date    APPENDECTOMY      cataractsx2  02/21/2011    COLONOSCOPY      COLONOSCOPY N/A 08/01/2022    Procedure: COLONOSCOPY;  Surgeon: Edgar May MD;  Location: [facility];  Service: Endoscopy;  Laterality: N/A;    ESOPHAGOGASTRODUODENOSCOPY N/A 08/01/2022    Procedure: EGD (ESOPHAGOGASTRODUODENOSCOPY);  Surgeon: Edgar May MD;  Location: [facility];  Service: Endoscopy;  Laterality: N/A;    ESOPHAGOGASTRODUODENOSCOPY N/A 9/12/2022    Procedure: EGD (ESOPHAGOGASTRODUODENOSCOPY);  Surgeon: Edgar May MD;  Location: [facility];  Service: Endoscopy;  Laterality: N/A;    ESOPHAGOGASTRODUODENOSCOPY N/A 3/26/2025    Procedure: EGD (ESOPHAGOGASTRODUODENOSCOPY);  Surgeon: Roxana Stuart MD;  Location: [facility];  Service: Endoscopy;  Laterality: N/A;    UPPER GASTROINTESTINAL ENDOSCOPY         Time Tracking:     OT Date of Treatment: 03/28/25  OT Start Time: 1332  OT Stop  Time: 1344  OT Total Time (min): 12 min    Billable Minutes:Evaluation 12    3/28/2025

## 2025-03-28 NOTE — ASSESSMENT & PLAN NOTE
Patient's blood pressure range in the last 24 hours was: BP  Min: 108/58  Max: 137/74.The patient's inpatient anti-hypertensive regimen is listed below:  Current Antihypertensives  furosemide injection 20 mg, Every 12 hours, Intravenous    Plan  - BP is controlled, no changes needed to their regimen  - given patient's acute anemia, we will hold any antihypertensives until patient with better volume status

## 2025-03-28 NOTE — ASSESSMENT & PLAN NOTE
Patient's blood pressure range in the last 24 hours was: BP  Min: 107/71  Max: 129/68.The patient's inpatient anti-hypertensive regimen is listed below:  Current Antihypertensives  furosemide injection 20 mg, Every 12 hours, Intravenous    Plan  - BP is controlled, no changes needed to their regimen  - given patient's acute anemia, we will hold any antihypertensives until patient with better volume status

## 2025-03-28 NOTE — CARE UPDATE
03/27/25 2000   Patient Assessment/Suction   Level of Consciousness (AVPU) alert   Respiratory Effort Normal;Unlabored   Expansion/Accessory Muscles/Retractions no use of accessory muscles;no retractions;expansion symmetric   All Lung Fields Breath Sounds coarse;rhonchi   Rhythm/Pattern, Respiratory unlabored;pattern regular   Cough Frequency no cough   PRE-TX-O2   Device (Oxygen Therapy) nasal cannula   Flow (L/min) (Oxygen Therapy) 2   SpO2 (!) 93 %   Pulse Oximetry Type Intermittent   $ Pulse Oximetry - Multiple Charge Pulse Oximetry - Multiple   Pulse 101   Resp (!) 21   Education   $ Education 15 min;Oxygen   Respiratory Evaluation   $ Care Plan Tech Time 15 min   $ Respiratory Evaluation Complete   Evaluation For New Orders   Admitting Diagnosis GI BLEED   Cardiac Diagnosis NA   Pulmonary Diagnosis NA   Home Oxygen   Has Home Oxygen? No   Home Aerosol, MDI, DPI, and Other Treatments/Therapies   Home Respiratory Therapy Per Patient/Review of Chart No   Oxygen Care Plan   Oxygen Care Plan Per Protocol   SPO2 Goal (%) 92% non-cardiac   Rationale SpO2 is <92% (Non-cardiac Pt.)   Bronchodilator Care Plan   Rationale No Rationale found   Atelectasis Care Plan   Rationale No Rational Found   Airway Clearance Care Plan   Rationale No rationale found

## 2025-03-28 NOTE — ASSESSMENT & PLAN NOTE
Patient has paroxysmal (<7 days) atrial fibrillation. Patient is currently in atrial fibrillation. [id number] Score: 2. The patients heart rate in the last 24 hours is as follows:  Pulse  Min: 65  Max: 109     Antiarrhythmics       Anticoagulants       Plan  - Replete lytes with a goal of K>4, Mg >2  - Patient is not anticoagulated due to acute GI bleed  - Patient's afib is currently controlled  - given patient's acute symptomatic anemia, hold rate control medicine, we will treat with p.r.n. IV meds if needed until patient's hemoglobin and hematocrit in a range not needing transfusion.

## 2025-03-28 NOTE — CONSULTS
Pt lying in bed in no acute distress. AAOx3. Very eager to return home. Helga Hutton at the bedside. Pt came from home lives with Mr. Hutton. Very independent and still driving. Living a good QOL prior to admission. Does not like being in the hospital however willing to come back if very sick. Desires DNR code status. Agreeable to return home with HH for extra support.     Advance Care Planning     Date: 03/28/2025    Living Will  During this visit, I engaged the patient and family  in the voluntary advance care planning process.  The patient and I reviewed the role for advance directives and their purpose in directing future healthcare if the patient's unable to speak for him/herself.  At this point in time, the patient does have full decision-making capacity.  We discussed different extreme health states that he could experience, and reviewed what kind of medical care he would want in those situations.  The patient and family communicated that if he were comatose and had little chance of a meaningful recovery, he would not want machines/life-sustaining treatments used. In addition to the above preference, other important end-of-life issues for the patient include  NO PEG tube . The patient has completed a living will to reflect these preferences.  I spent a total of 10 minutes engaging the patient in this advance care planning discussion.          Power of   I initiated the process of voluntary advance care planning today and explained the importance of this process to the patient.  I introduced the concept of advance directives to the patient, as well. Then the patient received detailed information about the importance of designating a Health Care Power of  (HCPOA). He was also instructed to communicate with this person about their wishes for future healthcare, should he become sick and lose decision-making capacity. The patient has not previously appointed a HCPOA. After our discussion, the  patient has decided to complete a HCPOA and has appointed his  Grandson and daughter , health care agent:  Brennen Edmondson / Sanjuanita Whitley  & health care agent number:   /  . I encouraged him to communicate with this person about their wishes for future healthcare, should he become sick and lose decision-making capacity.      A total of 10 min was spent on advance care planning, goals of care discussion, emotional support, formulating and communicating prognosis and exploring burden/benefit of various approaches of treatment. This discussion occurred on a fully voluntary basis with the verbal consent of the patient and/or family.    Code Status  In light of the patients advanced and life limiting illness,I engaged the the patient and family in a voluntary conversation about the patient's preferences for care  at the very end of life. The patient wishes to have a natural, peaceful death.  Along those lines, the patient does not wish to have CPR or other invasive treatments performed when his heart and/or breathing stops. I communicated to the patient and family that a DNR order would be placed in his medical record to reflect this preference and [place] form was completed to reflect other EOL preferences of the patient such as NO PEG tube.    A total of 10 min was spent on advance care planning, goals of care discussion, emotional support, formulating and communicating prognosis and exploring burden/benefit of various approaches of treatment. This discussion occurred on a fully voluntary basis with the verbal consent of the patient and/or family.     [facility]  I engaged the patient and family in a voluntary conversation about advance care planning and we specifically addressed what the goals of care would be moving forward, in light of the patient's change in clinical status, specifically CHF/CKD/Afib.  We did specifically address the patient's likely prognosis, which is fair .  We explored the  patient's values and preferences for future care.  The patient and family endorses that what is most important right now is to focus on spending time at home, avoiding the hospital, and remaining as independent as possible    Accordingly, we have decided that the best plan to meet the patient's goals includes continuing with treatment. Pt is eager to return home where he lives independently and still drives occasionally. Grandson lives with him and assists when needed. Pt is agreeable to HH. Hospice is not in alignment with his goals today.     A total of 35 min was spent on advance care planning, goals of care discussion, emotional support, formulating and communicating prognosis and exploring burden/benefit of various approaches of treatment. This discussion occurred on a fully voluntary basis with the verbal consent of the patient and/or family.       CM and medical team updated. Goals are clear. We will sign off. Thank you for consult.

## 2025-03-28 NOTE — PLAN OF CARE
Problem: Adult Inpatient Plan of Care  Goal: Plan of Care Review  Outcome: Progressing  Goal: Patient-Specific Goal (Individualized)  Outcome: Progressing  Goal: Absence of Hospital-Acquired Illness or Injury  Outcome: Progressing  Goal: Optimal Comfort and Wellbeing  Outcome: Progressing  Goal: Readiness for Transition of Care  Outcome: Progressing     Problem: Fall Injury Risk  Goal: Absence of Fall and Fall-Related Injury  Outcome: Progressing     Problem: Gastrointestinal Bleeding  Goal: Optimal Coping with Acute Illness  Outcome: Progressing  Goal: Hemostasis  Outcome: Progressing     Problem: Wound  Goal: Optimal Coping  Outcome: Progressing  Goal: Optimal Functional Ability  Outcome: Progressing  Goal: Absence of Infection Signs and Symptoms  Outcome: Progressing  Goal: Improved Oral Intake  Outcome: Progressing  Goal: Optimal Pain Control and Function  Outcome: Progressing  Goal: Skin Health and Integrity  Outcome: Progressing  Goal: Optimal Wound Healing  Outcome: Progressing     Problem: Skin Injury Risk Increased  Goal: Skin Health and Integrity  Outcome: Progressing     Problem: Coping Ineffective  Goal: Effective Coping  Outcome: Progressing

## 2025-03-28 NOTE — CONSULTS
Wound care consult completed by Mireille Melgar 3/27/25. Wound care team to continue to follow up throughout hospital stay.

## 2025-03-28 NOTE — ASSESSMENT & PLAN NOTE
Anemia is likely due to acute blood loss which was from GI bleed. Most recent hemoglobin and hematocrit are listed below.  Recent Labs     03/26/25  1016 03/27/25  0427 03/28/25  0409   HGB 8.4* 7.8* 7.7*   HCT 27.4* 24.9* 24.6*     Plan  - Monitor serial CBC: Every 8 hours  - Transfuse PRBC if patient becomes hemodynamically unstable, symptomatic or H/H drops below 7/21.  - Patient has received 1 units of PRBCs on 3/25/2025  - Patient's anemia is currently worsening. Will continue current treatment  - recheck stat H&H on arrival, transfuse if hemoglobin less than 7.

## 2025-03-28 NOTE — PT/OT/SLP EVAL
Physical Therapy Evaluation    Patient Name:  Vel Banda   MRN:  [id number]    Recommendations:     Discharge Recommendations: Low Intensity Therapy   Discharge Equipment Recommendations: none   Barriers to discharge:  medical status    Assessment:     Vel Banda is a 89 y.o. male admitted with a medical diagnosis of Gastrointestinal hemorrhage, unspecified gastrointestinal hemorrhage type.  He presents with the following impairments/functional limitations: weakness, impaired endurance, impaired functional mobility, gait instability, impaired balance, decreased lower extremity function, decreased safety awareness, impaired cardiopulmonary response to activity Patient found up in chair complaining of back pain and agreed to PT evaluation. Transfer sit to stand up to RW with modA. Ambulated 45ft with RW and CGA with slow lis and decreased step length. Returned to sit EOB and was CGA for sit to supine. All lines intact and needs met. Recommended to patient and his grandson to use the RW for walking when released from the hospital to improve his stability and functional mobility.     Rehab Prognosis: Good; patient would benefit from acute skilled PT services to address these deficits and reach maximum level of function.    Recent Surgery: Procedure(s) (LRB):  EGD (ESOPHAGOGASTRODUODENOSCOPY) (N/A) 2 Days Post-Op    Plan:     During this hospitalization, patient to be seen 6 x/week to address the identified rehab impairments via gait training, therapeutic activities, therapeutic exercises, neuromuscular re-education and progress toward the following goals:    Plan of Care Expires:  04/28/25    Subjective     Chief Complaint: my back hurts  Patient/Family Comments/goals: to go home  Pain/Comfort:       Patients cultural, spiritual, Mandaeism conflicts given the current situation: no    Living Environment:  Lives with grandson and his wife  Prior to admission, patients level of function was mod IND.   Equipment used at home: walker, rolling.  DME owned (not currently used): none.  Upon discharge, patient will have assistance from family.    Objective:     Communicated with nurse prior to session.  Patient found up in chair with telemetry  upon PT entry to room.    General Precautions: Standard, fall  Orthopedic Precautions:    Braces:    Respiratory Status: Room air    Exams:  Gross Motor Coordination:  WFL  RLE ROM: WFL  RLE Strength: WFL  LLE ROM: WFL  LLE Strength: WFL    Functional Mobility:  Bed Mobility:     Sit to Supine: contact guard assistance  Transfers:     Sit to Stand:  moderate assistance with rolling walker  Gait: 45ft with RW CGA      AM-PAC 6 CLICK MOBILITY  Total Score:20       Treatment & Education:  Pt educated on POC, discharge recommendation, need for assist with mobility, use of call bell to seek assistance as needed and fall prevention      Patient left HOB elevated with all lines intact, call button in reach, bed alarm on, and grandson present.    GOALS:   Multidisciplinary Problems       Physical Therapy Goals          Problem: Physical Therapy    Goal Priority Disciplines Outcome Interventions   Physical Therapy Goal     PT, PT/OT     Description: Goals to be met by: 25     Patient will increase functional independence with mobility by performin. Sit to stand transfer with Modified [place]  2. Bed to chair transfer with Modified [place] using Rolling Walker  3. Gait  x 150 feet with Modified [place] using Rolling Walker.                          DME Justifications:  No DME recommended requiring DME justifications    History:     Past Medical History:   Diagnosis Date    Actinic keratosis     Benign prostatic hyperplasia     Chronic kidney disease     Stage 3     Fibromyositis     Gouty arthropathy     Hyperlipidemia     Hypertension     Osteoarthritis of knee        Past Surgical History:   Procedure Laterality Date    APPENDECTOMY      cataractsx2   02/21/2011    COLONOSCOPY      COLONOSCOPY N/A 08/01/2022    Procedure: COLONOSCOPY;  Surgeon: Edgar May MD;  Location: [facility];  Service: Endoscopy;  Laterality: N/A;    ESOPHAGOGASTRODUODENOSCOPY N/A 08/01/2022    Procedure: EGD (ESOPHAGOGASTRODUODENOSCOPY);  Surgeon: Edgar May MD;  Location: [facility];  Service: Endoscopy;  Laterality: N/A;    ESOPHAGOGASTRODUODENOSCOPY N/A 9/12/2022    Procedure: EGD (ESOPHAGOGASTRODUODENOSCOPY);  Surgeon: Edgar May MD;  Location: [facility];  Service: Endoscopy;  Laterality: N/A;    ESOPHAGOGASTRODUODENOSCOPY N/A 3/26/2025    Procedure: EGD (ESOPHAGOGASTRODUODENOSCOPY);  Surgeon: Roxana Stuart MD;  Location: [facility];  Service: Endoscopy;  Laterality: N/A;    UPPER GASTROINTESTINAL ENDOSCOPY         Time Tracking:     PT Received On: 03/28/25  PT Start Time: 1350     PT Stop Time: 1403  PT Total Time (min): 13 min     Billable Minutes: Evaluation 13      03/28/2025

## 2025-03-28 NOTE — HOSPITAL COURSE
Patient transferred from [facility] with GI request for GI evaluation. Symptomatic anemia, improved with 1 unit PRBC. Underwent EGD:  Impression:            - 2 cm hiatal hernia.                          - Gastric mucosal atrophy. Biopsied.                          - Chronic erosive duodenitis. Biopsied.                          - [place]-colored mucosa classified as Crandall's                          stage C2-M2 per [place] criteria. Biopsied.                          - Erythematous mucosa in the antrum. Biopsied.   Recommendation:        - Await pathology results. PPI daily indefinite. F/u GI clinic 1 month.    PPI daily continued.  Cardiology consulted for atrial fibrillation and decompensated CHF. Patient given lasix but still c/o dyspnea. ECHO showed good EF, mod TR, and mild pulm hypertension. Lasix changed to PO. Palliative care met with patient and son. Confirmed DNR.  Started eliquis and hemoglobin stable. Set up with home health. By time of discharge the patient was tolerating appropriate diet with resolving admission symptoms. Patient seen and examined on day of discharge.    Physical exam on day of discharge:  General - Patient alert and oriented x3 in NAD  CV - Regular rate and rhythm   Resp -   No increased WOB   Extrem-  No cyanosis, clubbing, edema.   Psych - appropriate

## 2025-03-28 NOTE — ASSESSMENT & PLAN NOTE
Anemia is likely due to acute blood loss which was from GI bleed. Most recent hemoglobin and hematocrit are listed below.  Recent Labs     03/26/25  1016 03/27/25  0427 03/28/25  0409   HGB 8.4* 7.8* 7.7*   HCT 27.4* 24.9* 24.6*     Plan  - Monitor serial CBC: Every 8 hours  - Transfuse PRBC if patient becomes hemodynamically unstable, symptomatic or H/H drops below 7/21.  - Patient has received 1 units of PRBCs on 3/25/2025  - Patient's anemia is currently worsening. Will continue current treatment  - patient received 1 unit packed red blood cell at [facility]  -recheck H&H post transfusion on admit  -give additional PRBC if hemoglobin less than 7    Post transfusion increased hemoglobin 8.4, dropped again to 7.8

## 2025-03-28 NOTE — ASSESSMENT & PLAN NOTE
Patient has paroxysmal (<7 days) atrial fibrillation. Patient is currently in atrial fibrillation. [id number] Score: 2. The patients heart rate in the last 24 hours is as follows:  Pulse  Min: 63  Max: 106     Antiarrhythmics       Anticoagulants  apixaban tablet 2.5 mg, 2 times daily, Oral    Plan  - Replete lytes with a goal of K>4, Mg >2  - Patient is not anticoagulated due to acute GI bleed  - Patient's afib is currently controlled  - given patient's acute symptomatic anemia, hold rate control medicine, we will treat with p.r.n. IV meds if needed until patient's hemoglobin and hematocrit in a range not needing transfusion.

## 2025-03-28 NOTE — CONSULTS
Chief complaint:  Shortness of Breath      HPI:  Vel Banda is a 89 y.o. male presenting with Bilateral groin MASD with yeast, chronic that was POA. Pt has had the rash for years and it comes and goes but never resolves. No other complaints today    PMH:  As per HPI and below:  Past Medical History:   Diagnosis Date    Actinic keratosis     Benign prostatic hyperplasia     Chronic kidney disease     Stage 3     Fibromyositis     Gouty arthropathy     Hyperlipidemia     Hypertension     Osteoarthritis of knee        Social History     Socioeconomic History    Marital status:    Tobacco Use    Smoking status: Former     Current packs/day: 0.00     Types: Cigarettes     Quit date:      Years since quittin.2    Smokeless tobacco: Current     Types: Chew   Substance and Sexual Activity    Alcohol use: Yes     Comment: occasionally    Drug use: Never    Sexual activity: Not Currently     Social Drivers of Health     Financial Resource Strain: Patient Declined (3/25/2025)    Overall Financial Resource Strain (CARDIA)     Difficulty of Paying Living Expenses: Patient declined   Food Insecurity: Patient Declined (3/25/2025)    Hunger Vital Sign     Worried About Running Out of Food in the Last Year: Patient declined     Ran Out of Food in the Last Year: Patient declined   Transportation Needs: Patient Declined (3/25/2025)    PRAPARE - Transportation     Lack of Transportation (Medical): Patient declined     Lack of Transportation (Non-Medical): Patient declined   Stress: Patient Declined (3/25/2025)    [employer] - Occupational Stress Questionnaire     Feeling of Stress : Patient declined   Housing Stability: Patient Declined (3/25/2025)    Housing Stability Vital Sign     Unable to Pay for Housing in the Last Year: Patient declined     Homeless in the Last Year: Patient declined       Past Surgical History:   Procedure Laterality Date    APPENDECTOMY      cataractsx2   "2011    COLONOSCOPY      COLONOSCOPY N/A 2022    Procedure: COLONOSCOPY;  Surgeon: Edgar May MD;  Location: [facility];  Service: Endoscopy;  Laterality: N/A;    ESOPHAGOGASTRODUODENOSCOPY N/A 2022    Procedure: EGD (ESOPHAGOGASTRODUODENOSCOPY);  Surgeon: Edgar May MD;  Location: [facility] ENDO;  Service: Endoscopy;  Laterality: N/A;    ESOPHAGOGASTRODUODENOSCOPY N/A 2022    Procedure: EGD (ESOPHAGOGASTRODUODENOSCOPY);  Surgeon: Edgar May MD;  Location: [facility] ENDO;  Service: Endoscopy;  Laterality: N/A;    ESOPHAGOGASTRODUODENOSCOPY N/A 3/26/2025    Procedure: EGD (ESOPHAGOGASTRODUODENOSCOPY);  Surgeon: Roxana Stuart MD;  Location: [facility];  Service: Endoscopy;  Laterality: N/A;    UPPER GASTROINTESTINAL ENDOSCOPY         Family History   Problem Relation Name Age of Onset    Hypertension Mother      Coronary artery disease Mother      Colon cancer Neg Hx      Crohn's disease Neg Hx      Ulcerative colitis Neg Hx      Stomach cancer Neg Hx      Esophageal cancer Neg Hx         Review of patient's allergies indicates:   Allergen Reactions    Pcn [penicillins]        Medications Ordered Prior to Encounter[1]    ROS: As per HPI and below:  Pertinent items are noted in HPI.      Physical Exam:     Vitals:    25 0402 25 0715 25 0724 25 1115   BP: 107/71 129/68  114/63   Pulse: 96 97 92 63   Resp:  16  16   Temp: 98.5 °F (36.9 °C) 97.6 °F (36.4 °C)  98 °F (36.7 °C)   TempSrc: Oral Oral  Oral   SpO2: 98% 100% 100% 97%   Weight:       Height:           BP  Min: 68/36  Max: 148/60  Temp  Av.1 °F (36.7 °C)  Min: 97.5 °F (36.4 °C)  Max: 98.9 °F (37.2 °C)  Pulse  Av  Min: 63  Max: 109  Resp  Av.3  Min: 11  Max: 22  SpO2  Av.8 %  Min: 93 %  Max: 100 %  Height  Av' 6" (167.6 cm)  Min: 5' 6" (167.6 cm)  Max: 5' 6" (167.6 cm)  Weight  Av.3 kg (183 lb 11.1 oz)  Min: 81.6 kg (180 lb)  Max: 85 kg (187 lb 6.3 oz)    Body mass index is " 30.25 kg/m².          General:             Well developed, well nourished, no apparent distress  HEENT:              NCAT, no JVD, mucous membranes moist, EOM intact  Cardiovascular:  Regular rate and rhythm, normal S1, normal S2, No murmurs, rubs, or gallops  Respiratory:        Normal breath sounds, no wheezes, no rales, no rhonchi  Abdomen:           Bowel sounds present, non tender, non distended, no masses, no hepatojugular reflux  Extremities:        No clubbing, no cyanosis, no edema  Vascular:            2+ b/l radial.  Peripheral pulses intact.  No carotid bruits.  Neurological:      No focal deficits  Skin:                   No obvious rashes or erythema, Bilateral groin MASD with yeast, chronic                      Lab Results   Component Value Date    WBC 10.16 03/28/2025    HGB 7.7 (L) 03/28/2025    HCT 24.6 (L) 03/28/2025    MCV 81 (L) 03/28/2025     03/28/2025     Lab Results   Component Value Date    CHOL 173 03/10/2023    CHOL 142 12/09/2021     Lab Results   Component Value Date    HDL 35 (L) 03/10/2023    HDL 38 (L) 12/09/2021     Lab Results   Component Value Date    LDLCALC 122.4 03/10/2023    LDLCALC 89.6 12/09/2021     Lab Results   Component Value Date    TRIG 78 03/10/2023    TRIG 72 12/09/2021     Lab Results   Component Value Date    CHOLHDL 20.2 03/10/2023    CHOLHDL 26.8 12/09/2021     CMP  Recent Labs   Lab 03/28/25  0409   CALCIUM 8.6*   ALBUMIN 3.2*      K 4.0   CO2 25   BUN 53*   CREATININE 2.0*   ALKPHOS 67   ALT 8*   AST 16   BILITOT 0.8      Lab Results   Component Value Date    TSH 0.749 03/25/2025           Assessment and Recommendations     Diagnoses:    Bilateral groin MASD with yeast, chronic    Plan:  Miconazole powder to the bilateral groin BID    Complexity:    moderate         [1]   No current facility-administered medications on file prior to encounter.     Current Outpatient Medications on File Prior to Encounter   Medication Sig Dispense Refill     allopurinoL (ZYLOPRIM) 300 MG tablet TAKE 1 TABLET BY MOUTH EVERY DAY 90 tablet 0    aspirin 81 MG Chew Take 81 mg by mouth once daily.      cholestyramine, with sugar, 4 gram Powd Take 1 Scoop by mouth once daily. 3 each 3    hydroCHLOROthiazide (MICROZIDE) 12.5 mg capsule TAKE 1 CAPSULE BY MOUTH EVERY DAY 90 capsule 0    latanoprost 0.005 % ophthalmic solution 1 drop.      lisinopriL (PRINIVIL,ZESTRIL) 20 MG tablet Take 1 tablet (20 mg total) by mouth once daily. 90 tablet 3    polyethylene glycol (GLYCOLAX) 17 gram PwPk Take 17 g by mouth once daily. 100 packet 3    tamsulosin (FLOMAX) 0.4 mg Cap TAKE 1 CAPSULE BY MOUTH EVERY DAY 90 capsule 0    travoprost, benzalkonium, (TRAVATAN) 0.004 % ophthalmic solution 1 drop every evening.

## 2025-03-28 NOTE — ASSESSMENT & PLAN NOTE
Creatine stable for now. BMP reviewed- noted Estimated Creatinine Clearance: 21.6 mL/min (A) (based on SCr of 2 mg/dL (H)). according to latest data. Based on current GFR, CKD stage is stage 3 - GFR 30-59.  Monitor UOP and serial BMP and adjust therapy as needed. Renally dose meds. Avoid nephrotoxic medications and procedures..    Worsened creatinine likely secondary to acute anemia, additionally with lisinopril on board.  Hold home lisinopril and HCTZ  Recheck labwork post transfusion

## 2025-03-28 NOTE — SUBJECTIVE & OBJECTIVE
Interval History: patient agitated because he wants to go home; cardiology to re-eval for dc home tomorrow     Review of Systems   Constitutional:  Positive for activity change and fatigue.   Respiratory:  Positive for cough and shortness of breath.    Gastrointestinal:  Positive for blood in stool.   Musculoskeletal:  Positive for arthralgias and back pain.   Neurological:  Positive for tremors and weakness.   Psychiatric/Behavioral:  Positive for agitation. The patient is nervous/anxious.      Objective:     Vital Signs (Most Recent):  Temp: 97.9 °F (36.6 °C) (03/27/25 1926)  Pulse: 94 (03/27/25 1926)  Resp: 17 (03/27/25 1615)  BP: 117/65 (03/27/25 1926)  SpO2: 98 % (03/27/25 1926) Vital Signs (24h Range):  Temp:  [97.7 °F (36.5 °C)-98.4 °F (36.9 °C)] 97.9 °F (36.6 °C)  Pulse:  [] 94  Resp:  [16-22] 17  SpO2:  [95 %-98 %] 98 %  BP: (108-137)/(53-76) 117/65     Weight: 85 kg (187 lb 6.3 oz)  Body mass index is 30.25 kg/m².    Intake/Output Summary (Last 24 hours) at 3/27/2025 1946  Last data filed at 3/27/2025 1425  Gross per 24 hour   Intake 460 ml   Output 475 ml   Net -15 ml         Physical Exam  Constitutional:       General: He is not in acute distress.     Appearance: He is ill-appearing.   HENT:      Head: Normocephalic and atraumatic.      Mouth/Throat:      Mouth: Mucous membranes are moist.      Pharynx: Oropharynx is clear.   Pulmonary:      Effort: No respiratory distress.      Breath sounds: Rhonchi present.   Abdominal:      General: There is no distension.      Tenderness: There is no abdominal tenderness.   Musculoskeletal:         General: No swelling.   Skin:     General: Skin is warm and dry.      Capillary Refill: Capillary refill takes 2 to 3 seconds.   Neurological:      Mental Status: He is alert and oriented to person, place, and time. Mental status is at baseline.               Significant Labs: All pertinent labs within the past 24 hours have been reviewed.  Troponin: No results for  "input(s): "TROPONINI", "TROPONINIHS" in the last 48 hours.    Significant Imaging: I have reviewed all pertinent imaging results/findings within the past 24 hours.  "

## 2025-03-29 VITALS
DIASTOLIC BLOOD PRESSURE: 55 MMHG | WEIGHT: 187.38 LBS | RESPIRATION RATE: 16 BRPM | OXYGEN SATURATION: 93 % | SYSTOLIC BLOOD PRESSURE: 113 MMHG | HEIGHT: 66 IN | BODY MASS INDEX: 30.11 KG/M2 | TEMPERATURE: 99 F | HEART RATE: 86 BPM

## 2025-03-29 PROBLEM — I50.31 ACUTE DIASTOLIC HEART FAILURE: Status: ACTIVE | Noted: 2025-03-29

## 2025-03-29 LAB
ABSOLUTE EOSINOPHIL (SMH): 0.11 K/UL
ABSOLUTE MONOCYTE (SMH): 0.85 K/UL (ref 0.3–1)
ABSOLUTE NEUTROPHIL COUNT (SMH): 6.7 K/UL (ref 1.8–7.7)
ANION GAP (SMH): 9 MMOL/L (ref 8–16)
BASOPHILS # BLD AUTO: 0.02 K/UL
BASOPHILS NFR BLD AUTO: 0.2 %
BUN SERPL-MCNC: 49 MG/DL (ref 10–30)
CALCIUM SERPL-MCNC: 8.6 MG/DL (ref 8.7–10.5)
CHLORIDE SERPL-SCNC: 106 MMOL/L (ref 95–110)
CO2 SERPL-SCNC: 24 MMOL/L (ref 23–29)
CREAT SERPL-MCNC: 1.9 MG/DL (ref 0.5–1.4)
ERYTHROCYTE [DISTWIDTH] IN BLOOD BY AUTOMATED COUNT: 20.1 % (ref 11.5–14.5)
GFR SERPLBLD CREATININE-BSD FMLA CKD-EPI: 32 ML/MIN/1.73/M2
GLUCOSE SERPL-MCNC: 92 MG/DL (ref 70–110)
HCT VFR BLD AUTO: 25.8 % (ref 40–54)
HGB BLD-MCNC: 8 GM/DL (ref 14–18)
IMM GRANULOCYTES # BLD AUTO: 0.04 K/UL (ref 0–0.04)
IMM GRANULOCYTES NFR BLD AUTO: 0.5 % (ref 0–0.5)
LYMPHOCYTES # BLD AUTO: 0.71 K/UL (ref 1–4.8)
MCH RBC QN AUTO: 24.8 PG (ref 27–31)
MCHC RBC AUTO-ENTMCNC: 31 G/DL (ref 32–36)
MCV RBC AUTO: 80 FL (ref 82–98)
NUCLEATED RBC (/100WBC) (SMH): 0 /100 WBC
PLATELET # BLD AUTO: 266 K/UL (ref 150–450)
PMV BLD AUTO: 10 FL (ref 9.2–12.9)
POTASSIUM SERPL-SCNC: 3.7 MMOL/L (ref 3.5–5.1)
RBC # BLD AUTO: 3.23 M/UL (ref 4.6–6.2)
RELATIVE EOSINOPHIL (SMH): 1.3 % (ref 0–8)
RELATIVE LYMPHOCYTE (SMH): 8.5 % (ref 18–48)
RELATIVE MONOCYTE (SMH): 10.1 % (ref 4–15)
RELATIVE NEUTROPHIL (SMH): 79.4 % (ref 38–73)
SODIUM SERPL-SCNC: 139 MMOL/L (ref 136–145)
WBC # BLD AUTO: 8.39 K/UL (ref 3.9–12.7)

## 2025-03-29 PROCEDURE — 25000003 PHARM REV CODE 250: Performed by: NURSE PRACTITIONER

## 2025-03-29 PROCEDURE — 25000003 PHARM REV CODE 250: Performed by: REGISTERED NURSE

## 2025-03-29 PROCEDURE — 36415 COLL VENOUS BLD VENIPUNCTURE: CPT | Performed by: REGISTERED NURSE

## 2025-03-29 PROCEDURE — 80048 BASIC METABOLIC PNL TOTAL CA: CPT | Performed by: STUDENT IN AN ORGANIZED HEALTH CARE EDUCATION/TRAINING PROGRAM

## 2025-03-29 PROCEDURE — 36415 COLL VENOUS BLD VENIPUNCTURE: CPT | Performed by: STUDENT IN AN ORGANIZED HEALTH CARE EDUCATION/TRAINING PROGRAM

## 2025-03-29 PROCEDURE — 63600175 PHARM REV CODE 636 W HCPCS: Performed by: NURSE PRACTITIONER

## 2025-03-29 PROCEDURE — 85025 COMPLETE CBC W/AUTO DIFF WBC: CPT | Performed by: REGISTERED NURSE

## 2025-03-29 PROCEDURE — 94761 N-INVAS EAR/PLS OXIMETRY MLT: CPT

## 2025-03-29 PROCEDURE — 25000003 PHARM REV CODE 250: Performed by: HOSPITALIST

## 2025-03-29 RX ORDER — FUROSEMIDE 20 MG/1
20 TABLET ORAL DAILY
Qty: 90 TABLET | Refills: 1 | Status: SHIPPED | OUTPATIENT
Start: 2025-03-29 | End: 2025-03-29

## 2025-03-29 RX ORDER — PANTOPRAZOLE SODIUM 40 MG/1
40 TABLET, DELAYED RELEASE ORAL DAILY
Qty: 90 TABLET | Refills: 3 | Status: SHIPPED | OUTPATIENT
Start: 2025-03-30 | End: 2026-03-30

## 2025-03-29 RX ORDER — PANTOPRAZOLE SODIUM 40 MG/1
40 TABLET, DELAYED RELEASE ORAL DAILY
Qty: 90 TABLET | Refills: 3 | Status: SHIPPED | OUTPATIENT
Start: 2025-03-30 | End: 2025-03-29

## 2025-03-29 RX ORDER — FUROSEMIDE 20 MG/1
20 TABLET ORAL DAILY
Qty: 90 TABLET | Refills: 1 | Status: SHIPPED | OUTPATIENT
Start: 2025-03-29

## 2025-03-29 RX ADMIN — APIXABAN 2.5 MG: 2.5 TABLET, FILM COATED ORAL at 10:03

## 2025-03-29 RX ADMIN — ALLOPURINOL 300 MG: 300 TABLET ORAL at 10:03

## 2025-03-29 RX ADMIN — PANTOPRAZOLE SODIUM 40 MG: 40 TABLET, DELAYED RELEASE ORAL at 06:03

## 2025-03-29 RX ADMIN — FUROSEMIDE 20 MG: 10 INJECTION, SOLUTION INTRAMUSCULAR; INTRAVENOUS at 10:03

## 2025-03-29 NOTE — PROGRESS NOTES
Pt discharge home. IV and telemetry removed. Discharge instructions reviewed with pt and grandson. Both voices understanding. Pt transferred via wheelchair to private vehicle.

## 2025-03-29 NOTE — PT/OT/SLP PROGRESS
Physical Therapy      Patient Name:  Vel Banda   MRN:  [id number]    Patient not seen today secondary to prepping for d/c  . Will follow-up if d/c changes.

## 2025-03-29 NOTE — DISCHARGE SUMMARY
Critical access hospital Medicine  Discharge Summary      Patient Name: Vel Banda  MRN: [id number]  VERÓNICA: [id number]  Patient Class: IP- Inpatient  Admission Date: 3/25/2025  Hospital Length of Stay: 4 days  Discharge Date and Time:  03/29/2025 12:50 PM  Attending Physician: Ru Urbano MD   Discharging Provider: Ru Urbano MD  Primary Care Provider: Thalia Felton MD    Primary Care Team: Networked reference to record PCT     HPI:   Patient is a 89-year-old male history of CKD 3, HTN, HLD, gout presented to ED at [facility] with shortness of breath over the last month, progressively worsening dyspnea on exertion over the last week.  Patient denies chest pain, hemoptysis, orthopnea, PND, syncope, rectal bleeding..  In ED labwork remarkable for hemoglobin of 6.0 with a baseline of 15 last taken 18 months ago.  Troponin negative x2, BNP EKG AFib with LBBB, rate controlled  Chemistry shows a creatinine of 2.1 with patients baseline 1.5.  Patient has had iron studies which shows low iron normal TIBC, low ferritin low iron sat and elevated reticulocyte count.  Fecal occult positive for blood.  D-dimer elevated at 1.1 which could be related to GI bleed., patient has a new onset AFib.  Patient was unable to have CTA chest secondary to elevated creatinine.  Patient was transfuse 1 unit packed red blood cells at prior facility.  GI was made aware.  Patient was given 80 mg Protonix bolus And given dose of 20 mg of Lasix.  Chest x-ray shows pulmonary edema.  Patient tolerating room air.  Patient transferred to [facility] for GI eval.  On exam patient only complains of dyspnea on exertion and fatigue.  Vitals are all stable.  AFib is controlled rate.  Patient will be admitted to Hospital Medicine will consult GI and Cardiology.    Procedure(s) (LRB):  EGD (ESOPHAGOGASTRODUODENOSCOPY) (N/A)      Hospital Course:   Patient transferred from [facility] with GI request for GI evaluation. Symptomatic  anemia, improved with 1 unit PRBC. Underwent EGD:  Impression:            - 2 cm hiatal hernia.                          - Gastric mucosal atrophy. Biopsied.                          - Chronic erosive duodenitis. Biopsied.                          - Lee-colored mucosa classified as Crandall's                          stage C2-M2 per [place] criteria. Biopsied.                          - Erythematous mucosa in the antrum. Biopsied.   Recommendation:        - Await pathology results. PPI daily indefinite. F/u GI clinic 1 month.    PPI daily continued.  Cardiology consulted for atrial fibrillation and decompensated CHF. Patient given lasix but still c/o dyspnea. ECHO showed good EF, mod TR, and mild pulm hypertension. Lasix changed to PO. Palliative care met with patient and son. Confirmed DNR.  Started eliquis and hemoglobin stable. Set up with home health. By time of discharge the patient was tolerating appropriate diet with resolving admission symptoms. Patient seen and examined on day of discharge.    Physical exam on day of discharge:  General - Patient alert and oriented x3 in NAD  CV - Regular rate and rhythm   Resp -   No increased WOB   Extrem-  No cyanosis, clubbing, edema.   Psych - appropriate       Goals of Care Treatment Preferences:  Code Status: DNR    Health care agent: Brennen Edmondson / Sanjuanita Whitley  Health care agent number:  /     Living Will: Yes     What is most important right now is to focus on spending time at home, avoiding the hospital, remaining as independent as possible.  Accordingly, we have decided that the best plan to meet the patient's goals includes continuing with treatment.      SDOH Screening:  The patient declined to be screened for utility difficulties, food insecurity, transport difficulties, housing insecurity, and interpersonal safety, so no concerns could be identified this admission.     Consults:   Consults (From admission, onward)          Status  Ordering Provider     Inpatient consult to Palliative Care  Once        Provider:  Peter Galeana MD    Completed ARUN BARBA     Inpatient consult to Wound Care Physician  Once        Provider:  Juan Antonio Lance DO    Completed ARUN BARBA     [facility]  Once        Provider:  Radha Crane MD    Completed LEV RUSHING     Inpatient consult to Gastroenterology  Once        Provider:  José Holliday III, MD    Completed LEV RUSHING                  Final Active Diagnoses:    Diagnosis Date Noted POA    PRINCIPAL PROBLEM:  Gastrointestinal hemorrhage, unspecified gastrointestinal hemorrhage type [K92.2] 03/25/2025 Yes    Acute diastolic heart failure [I50.31] 03/29/2025 Yes    New onset atrial fibrillation [I48.91] 03/26/2025 Yes    Symptomatic anemia [D64.9] 03/26/2025 Yes    ABLA (acute blood loss anemia) [D62] 03/26/2025 Yes    Gout [M10.9] 03/26/2025 Yes    CANNON (dyspnea on exertion) [R06.09] 03/26/2025 Yes    Elevated d-dimer [R79.89] 03/26/2025 Yes    Essential hypertension [I10] 01/14/2021 Yes    Benign prostatic hyperplasia [N40.0] 01/14/2021 Yes    Stage 3 chronic kidney disease [N18.30] 11/08/2018 Yes      Problems Resolved During this Admission:    Diagnosis Date Noted Date Resolved POA    GI bleed [K92.2] 03/26/2025 03/26/2025 Yes       Discharged Condition: fair    Disposition: Home-Health Care [facility]    Follow Up:   Follow-up Information       Thalia Felton MD. Go on 4/3/2025.    Specialty: Internal Medicine  Why: 1:45 PM for hospital follow up  Contact information:  [address] [place] [address] [phone]               Abraham Tomas MD Follow up.    Specialties: Cardiovascular Disease, Cardiology  Why: inbasket message sent to the cardiology clinic for your follow up.  Contact information:  [address]  [address]  [place] [address]  [phone]               Roxana Stuart MD. Schedule an appointment as  soon as possible for a visit in 4 week(s).    Specialty: Gastroenterology  Why: Or GI of choice  Contact information:  [address] [place] [address]  [phone]                           Patient Instructions:      Ambulatory referral/consult to Home Health   Standing Status: Future   Referral Priority: Routine Referral Type: Home Health   Referral Reason: Specialty Services Required   Requested Specialty: Home Health Services   Number of Visits Requested: 1     Diet renal     Notify your health care provider if you experience any of the following:  temperature >100.4     Notify your health care provider if you experience any of the following:  persistent nausea and vomiting or diarrhea     Notify your health care provider if you experience any of the following:  severe uncontrolled pain     Notify your health care provider if you experience any of the following:  redness, tenderness, or signs of infection (pain, swelling, redness, odor or green/yellow discharge around incision site)     Notify your health care provider if you experience any of the following:  difficulty breathing or increased cough     Notify your health care provider if you experience any of the following:  severe persistent headache     Notify your health care provider if you experience any of the following:  worsening rash     Notify your health care provider if you experience any of the following:  persistent dizziness, light-headedness, or visual disturbances     Notify your health care provider if you experience any of the following:  increased confusion or weakness     Admit to Inpatient   Order Comments: I hereby certify that inpatient services were ordered in accordance with Centers for Medicare and Medicaid Services regulations concerning the order and that inpatient services are reasonable and necessary or the patient is undergoing an inpatient-only procedure. Services not specified as inpatient only under 42 .22(n) are  appropriately provided as inpatient services in accordance with 42 .3(e)     Activity as tolerated       Significant Diagnostic Studies:     Lab Results   Component Value Date    WBC 8.39 03/29/2025    HGB 8.0 (L) 03/29/2025    HCT 25.8 (L) 03/29/2025    MCV 80 (L) 03/29/2025     03/29/2025       BMP  Lab Results   Component Value Date     03/29/2025    K 3.7 03/29/2025     09/13/2023    CO2 24 03/29/2025    BUN 49 (H) 03/29/2025    CREATININE 1.9 (H) 03/29/2025    CALCIUM 8.6 (L) 03/29/2025    ANIONGAP 9 09/13/2023    EGFRNORACEVR 32 (L) 03/29/2025       X-Ray Chest 1 View  Result Date: 3/28/2025  EXAMINATION: XR CHEST 1 VIEW CLINICAL HISTORY: CHF; COMPARISON: March 27 FINDINGS: Mild cardiomegaly is stable.  The thoracic aorta is elongated.  Again noted is pulmonary vascular congestion and abnormal interstitial prominence with small bilateral pleural effusions.  Findings are compatible with CHF/volume overload, unchanged.  There is no pneumothorax.  No acute osseous abnormality is identified.     1. No significant interval change compared to March 27. Electronically signed by: Andrzej Archibald Date:    03/28/2025 Time:    07:15    X-Ray Chest AP Portable  Result Date: 3/27/2025  EXAMINATION: XR CHEST AP PORTABLE CLINICAL HISTORY: CHF; Heart failure, unspecified FINDINGS: Portable chest with comparison chest x-ray 03/26/2025.  Unchanged enlarged cardiomediastinal silhouette.Bilateral perihilar interstitial thickening and haziness likely reflecting pulmonary edema is unchanged.  Small bilateral pleural effusions again observed.  Pulmonary vasculature is normal. No acute osseous abnormality.     No significant change of CHF lung pattern. Electronically signed by: Casey Flowers Date:    03/27/2025 Time:    12:43    Echo  Result Date: 3/27/2025    Left Ventricle: Left ventricle was not well visualized due to poor sonic window. The left ventricle is at the upper limits of normal in size.  Increased wall thickness. There is concentric hypertrophy. Septal motion is consistent with bundle branch block. There is low normal systolic function with a visually estimated ejection fraction of 50 - 55%. Unable to assess diastolic function due to atrial fibrillation.   Right Ventricle: The right ventricle is normal in size. Systolic function is normal.   Aortic Valve: The aortic valve is a trileaflet valve. There is severe aortic valve sclerosis. Moderately calcified right cusp. Moderately restricted motion. There is mild stenosis. Aortic valve area by VTI is 1.2 cm². Aortic valve area by velocity is 1.1 cm². Aortic valve peak velocity is 2.4 m/s. Mean gradient is 13 mmHg. Aortic Valve peak gradient is 23 mmHg. The dimensionless index is 0.48. There is mild aortic regurgitation.   Mitral Valve: There is mild to moderate regurgitation.   Tricuspid Valve: There is moderate regurgitation. There is mild pulmonary hypertension. The estimated PA systolic pressure is at least 44 mmHg.     NM Lung Ventilation Perfusion Imaging  Result Date: 3/26/2025  CLINICAL HISTORY: ([id number])89 y/o  (4/6/1927) M PE suspected, intermediate prob, neg D-dimer; TECHNIQUE: (A#[id number], exam time 3/26/2025 10:46) NM LUNG VENTILATION AND PERFUSION IMAGING [id number] After the inhalation of aerosol from a nebulizer containing 32 mCi Tc-99m DTPA, 8 standard projections of the lungs were acquired. Then, 6 mCi Tc-99m MAA was injected intravenously and the same views were repeated. COMPARISON: Chest x-ray dated 03/26/2025 showing mild interstitial edema FINDINGS: Ventilation images demonstrate homogeneous tracer distribution. Perfusion images demonstrate small subsegmental defect in right upper lobe.  There are no moderate or large subsegmental or segmental perfusion defects.     Low probability for acute PE. Electronically signed by: Marlen Cunningham Date:    03/26/2025 Time:    11:05    X-Ray Chest AP Portable  Result Date:  3/26/2025  EXAMINATION: XR CHEST AP PORTABLE CLINICAL HISTORY: pulm edema; FINDINGS: Portable chest at 03:59 is compared to 03/25/2025 shows normal cardiomediastinal silhouette. Improvement of the interstitial edema.  There are tiny pleural effusions.  There are no confluent infiltrates.  Pulmonary vasculature is normal. No acute osseous abnormality.     Improvement of the interstitial edema with tiny pleural effusions Electronically signed by: Marlen Cunningham Date:    03/26/2025 Time:    07:48    X-Ray Chest AP Portable  Result Date: 3/25/2025  EXAMINATION: XR CHEST AP PORTABLE CLINICAL HISTORY: Shortness of breath; TECHNIQUE: Single frontal view of the chest was performed. COMPARISON: None FINDINGS: Cardiac silhouette appears enlarged.  Prominent perihilar markings suggesting pulmonary vascular distention or mild perihilar infiltrate and CHF.  Small right pleural effusion and questionable very small left pleural effusion     Appearance suggesting CHF Electronically signed by: Ramona Kan MD Date:    03/25/2025 Time:    17:13  - pulls last radiology orders      Pending Diagnostic Studies:       None           Medications:  Reconciled Home Medications:      Medication List        START taking these medications      apixaban 2.5 mg Tab  Commonly known as: ELIQUIS  Take 1 tablet (2.5 mg total) by mouth 2 (two) times daily.     furosemide 20 MG tablet  Commonly known as: LASIX  Take 1 tablet (20 mg total) by mouth once daily.     pantoprazole 40 MG tablet  Commonly known as: PROTONIX  Take 1 tablet (40 mg total) by mouth once daily.  Start taking on: March 30, 2025            CONTINUE taking these medications      allopurinoL 300 MG tablet  Commonly known as: ZYLOPRIM  TAKE 1 TABLET BY MOUTH EVERY DAY     cholestyramine (with sugar) 4 gram Powd  Take 1 Scoop by mouth once daily.     latanoprost 0.005 % ophthalmic solution  1 drop.     polyethylene glycol 17 gram Pwpk  Commonly known as: GLYCOLAX  Take 17 g by mouth  once daily.     tamsulosin 0.4 mg Cap  Commonly known as: FLOMAX  TAKE 1 CAPSULE BY MOUTH EVERY DAY     travoprost (benzalkonium) 0.004 % ophthalmic solution  Commonly known as: TRAVATAN  1 drop every evening.            STOP taking these medications      aspirin 81 MG Chew     hydroCHLOROthiazide 12.5 mg capsule  Commonly known as: MICROZIDE     lisinopriL 20 MG tablet  Commonly known as: PRINIVIL,ZESTRIL              Indwelling Lines/Drains at time of discharge:   Lines/Drains/Airways       None                   Time spent on the discharge of patient: 33 minutes         Ru Urbano MD  Department of Hospital Medicine  [facility]

## 2025-03-29 NOTE — PLAN OF CARE
03/29/25 1628   Final Note   Assessment Type Final Discharge Note   Anticipated Discharge Disposition [facility]   Hospital Resources/Appts/Education Provided Appointments scheduled and added to AVS;Post-Acute resouces added to AVS   Post-Acute Status   Discharge Delays None known at this time       **Patient cleared for DC from CM standpoint**    Patient discharging with [facility]. Patient is to follow up with his PCP on 4/3/25 at 1:45. Also, patient is to follow up with Cardiology and GI, in basket message sent for scheduling. Encouraged patient's grandson to follow up with cardiology and GI on Monday for scheduling, contact numbers listed on AVS. Patients grandson will provide transportation home. No further DC needs at this time.

## 2025-04-21 ENCOUNTER — OFFICE VISIT (OUTPATIENT)
Dept: FAMILY MEDICINE | Facility: CLINIC | Age: OVER 89
End: 2025-04-21
Payer: MEDICARE

## 2025-04-21 ENCOUNTER — HOSPITAL ENCOUNTER (OUTPATIENT)
Dept: RADIOLOGY | Facility: HOSPITAL | Age: OVER 89
Discharge: HOME | End: 2025-04-21
Attending: FAMILY MEDICINE
Payer: MEDICARE

## 2025-04-21 DIAGNOSIS — D64.9 ANEMIA, UNSPECIFIED TYPE: ICD-10-CM

## 2025-04-21 DIAGNOSIS — J90 PLEURAL EFFUSION: ICD-10-CM

## 2025-04-21 DIAGNOSIS — R06.02 SHORTNESS OF BREATH: ICD-10-CM

## 2025-04-21 DIAGNOSIS — R06.02 SHORTNESS OF BREATH: Primary | ICD-10-CM

## 2025-04-21 DIAGNOSIS — I48.0 PAROXYSMAL ATRIAL FIBRILLATION: ICD-10-CM

## 2025-04-21 PROCEDURE — 71046 X-RAY EXAM CHEST 2 VIEWS: CPT | Mod: TC

## 2025-04-21 PROCEDURE — 99999 PR PBB SHADOW E&M-EST. PATIENT-LVL V: CPT | Mod: PBBFAC,,, | Performed by: FAMILY MEDICINE

## 2025-04-21 PROCEDURE — 71046 X-RAY EXAM CHEST 2 VIEWS: CPT | Mod: 26,,, | Performed by: RADIOLOGY

## 2025-04-21 PROCEDURE — 99214 OFFICE O/P EST MOD 30 MIN: CPT | Mod: S$PBB,,, | Performed by: FAMILY MEDICINE

## 2025-04-21 PROCEDURE — 99215 OFFICE O/P EST HI 40 MIN: CPT | Mod: PBBFAC,25 | Performed by: FAMILY MEDICINE

## 2025-04-22 ENCOUNTER — PATIENT MESSAGE (OUTPATIENT)
Dept: FAMILY MEDICINE | Facility: CLINIC | Age: OVER 89
End: 2025-04-22
Payer: MEDICARE

## 2025-04-22 ENCOUNTER — RESULTS FOLLOW-UP (OUTPATIENT)
Dept: FAMILY MEDICINE | Facility: CLINIC | Age: OVER 89
End: 2025-04-22

## 2025-04-22 VITALS
OXYGEN SATURATION: 92 % | BODY MASS INDEX: 30.37 KG/M2 | WEIGHT: 189 LBS | DIASTOLIC BLOOD PRESSURE: 80 MMHG | HEART RATE: 89 BPM | HEIGHT: 66 IN | SYSTOLIC BLOOD PRESSURE: 122 MMHG

## 2025-04-22 DIAGNOSIS — J90 PLEURAL EFFUSION: Primary | ICD-10-CM

## 2025-04-22 RX ORDER — FUROSEMIDE 20 MG/1
TABLET ORAL
Qty: 10 TABLET | Refills: 0 | Status: ON HOLD | OUTPATIENT
Start: 2025-04-22 | End: 2025-05-14 | Stop reason: HOSPADM

## 2025-04-22 NOTE — PROGRESS NOTES
[facility]      Subjective         Chief Complaint   Patient presents with    [facility]     SOB      Pleasant 89-year-old male present complaining of slowly increasing dyspnea.  Here with grandson.  Dr. Dickinson PCP.  Inpatient hospitalization [facility] 1 month ago for anemia requiring 1 PRVC, new onset AFib.  Patient has not followed up with Cardiology.  Patient reports increasing walking dyspnea for the past week.  Denies chest pain.  Denies lower extremity swelling.  Denies orthopnea and coughing.  Denies fever and sputum production.  Currently receiving home health therapy, reports RN auscultated we will rattling in the chest.  Recent discharge summary reviewed.    Past Medical History:   Diagnosis Date    Actinic keratosis     Benign prostatic hyperplasia     Chronic kidney disease     Stage 3     Fibromyositis     Gouty arthropathy     Hyperlipidemia     Hypertension     Osteoarthritis of knee         Past Surgical History:   Procedure Laterality Date    APPENDECTOMY      [email or web address]  02/21/2011    COLONOSCOPY      COLONOSCOPY N/A 08/01/2022    Procedure: COLONOSCOPY;  Surgeon: Edgar May MD;  Location: [facility];  Service: Endoscopy;  Laterality: N/A;    ESOPHAGOGASTRODUODENOSCOPY N/A 08/01/2022    Procedure: EGD (ESOPHAGOGASTRODUODENOSCOPY);  Surgeon: Edgar May MD;  Location: [facility];  Service: Endoscopy;  Laterality: N/A;    ESOPHAGOGASTRODUODENOSCOPY N/A 9/12/2022    Procedure: EGD (ESOPHAGOGASTRODUODENOSCOPY);  Surgeon: Edgar May MD;  Location: [facility];  Service: Endoscopy;  Laterality: N/A;    ESOPHAGOGASTRODUODENOSCOPY N/A 3/26/2025    Procedure: EGD (ESOPHAGOGASTRODUODENOSCOPY);  Surgeon: Roxana Stuart MD;  Location: [facility];  Service: Endoscopy;  Laterality: N/A;    UPPER GASTROINTESTINAL ENDOSCOPY          Review of patient's allergies indicates:   Allergen Reactions    Pcn [penicillins]         Review of Systems   Constitutional:  "Negative.    HENT: Negative.     Eyes: Negative.    Respiratory:  Positive for shortness of breath. Negative for sputum production.         Reports more dyspnea with walking, no resting dyspnea   Cardiovascular: Negative.  Negative for chest pain, palpitations, orthopnea and leg swelling.   Gastrointestinal: Negative.    Genitourinary: Negative.    Musculoskeletal: Negative.    Skin: Negative.    Neurological: Negative.  Negative for dizziness and weakness.   Endo/Heme/Allergies: Negative.    Psychiatric/Behavioral: Negative.                 Objective      Vitals:    04/21/25 1530 04/21/25 1538   BP: 122/80    BP Location: Left arm    Patient Position: Sitting    Pulse: (!) 185 94  Comment: Kellen   SpO2: (!) 92%    Weight: 85.7 kg (189 lb)    Height: 5' 6" (1.676 m)         Physical Exam  Vitals reviewed.   Constitutional:       Appearance: Normal appearance.   HENT:      Head: Normocephalic and atraumatic.      Right Ear: Tympanic membrane and ear canal normal.      Left Ear: Tympanic membrane and ear canal normal.      Ears:      Comments: Nonobstructive cerumen bilaterally     Nose: Nose normal.      Mouth/Throat:      Mouth: Mucous membranes are moist.      Pharynx: Oropharynx is clear.   Eyes:      Extraocular Movements: Extraocular movements intact.      Comments: Conjunctiva pale   Cardiovascular:      Rate and Rhythm: Normal rate. Rhythm irregular.      Pulses: Normal pulses.   Pulmonary:      Effort: Pulmonary effort is normal. No respiratory distress.      Breath sounds: No rales.      Comments: Breath sounds diminished bilaterally.  Non dyspneic non tachypneic.  Symmetric excursion.  No audible wheeze  Abdominal:      General: Abdomen is flat.      Palpations: Abdomen is soft. There is no mass.      Tenderness: There is no abdominal tenderness.   Musculoskeletal:         General: No swelling or tenderness. Normal range of motion.      Cervical back: Neck supple. No tenderness.   Lymphadenopathy:      " Cervical: No cervical adenopathy.   Skin:     General: Skin is warm and dry.      Capillary Refill: Capillary refill takes less than 2 seconds.   Neurological:      General: No focal deficit present.      Mental Status: He is alert.   Psychiatric:         Mood and Affect: Mood normal.         Lab Results   Component Value Date    TSH 0.749 03/25/2025    TSH 1.525 03/10/2023        Lab Results   Component Value Date    HGBA1C 4.6 09/13/2023    HGBA1C 4.5 03/10/2023      Lab Results   Component Value Date    HGBA1C 4.6 09/13/2023    HGBA1C 4.5 03/10/2023     CMP  Sodium   Date Value Ref Range Status   04/21/2025 141 136 - 145 mmol/L Final   03/29/2025 139 136 - 145 mmol/L Final   03/28/2025 139 136 - 145 mmol/L Final   03/27/2025 140 136 - 145 mmol/L Final     Potassium   Date Value Ref Range Status   04/21/2025 4.4 3.5 - 5.1 mmol/L Final   03/29/2025 3.7 3.5 - 5.1 mmol/L Final   03/28/2025 4.0 3.5 - 5.1 mmol/L Final   03/27/2025 4.1 3.5 - 5.1 mmol/L Final     Chloride   Date Value Ref Range Status   04/21/2025 109 95 - 110 mmol/L Final   09/13/2023 109 95 - 110 mmol/L Final   03/10/2023 108 95 - 110 mmol/L Final   09/29/2022 107 95 - 110 mmol/L Final     CO2   Date Value Ref Range Status   04/21/2025 23 23 - 29 mmol/L Final   03/29/2025 24 23 - 29 mmol/L Final   03/28/2025 25 23 - 29 mmol/L Final   03/27/2025 19 (L) 23 - 29 mmol/L Final     Glucose   Date Value Ref Range Status   09/13/2023 97 70 - 110 mg/dL Final   03/10/2023 92 70 - 110 mg/dL Final   09/29/2022 102 70 - 110 mg/dL Final     BUN   Date Value Ref Range Status   04/21/2025 47 (H) 10 - 30 mg/dL Final   03/29/2025 49 (H) 10 - 30 mg/dL Final   03/28/2025 53 (H) 10 - 30 mg/dL Final   03/27/2025 56 (H) 10 - 30 mg/dL Final     Creatinine   Date Value Ref Range Status   04/21/2025 2.0 (H) 0.5 - 1.4 mg/dL Final   03/29/2025 1.9 (H) 0.5 - 1.4 mg/dL Final   03/28/2025 2.0 (H) 0.5 - 1.4 mg/dL Final   03/27/2025 2.1 (H) 0.5 - 1.4 mg/dL Final     Calcium   Date  Value Ref Range Status   04/21/2025 8.7 8.7 - 10.5 mg/dL Final   03/29/2025 8.6 (L) 8.7 - 10.5 mg/dL Final   03/28/2025 8.6 (L) 8.7 - 10.5 mg/dL Final   03/27/2025 8.6 (L) 8.7 - 10.5 mg/dL Final     Total Protein   Date Value Ref Range Status   09/13/2023 6.8 6.0 - 8.4 g/dL Final   03/10/2023 6.6 6.0 - 8.4 g/dL Final   09/29/2022 6.7 6.0 - 8.4 g/dL Final     Albumin   Date Value Ref Range Status   04/21/2025 3.0 (L) 3.5 - 5.2 g/dL Final   03/28/2025 3.2 (L) 3.5 - 5.2 g/dL Final   03/27/2025 3.3 (L) 3.5 - 5.2 g/dL Final   03/26/2025 3.4 (L) 3.5 - 5.2 g/dL Final     Bilirubin Total   Date Value Ref Range Status   04/21/2025 0.6 0.1 - 1.0 mg/dL Final     Comment:     For infants and newborns, interpretation of results should be based   on gestational age, weight and in agreement with clinical   observations.    Premature Infant recommended reference ranges:   0-24 hours:  <8.0 mg/dL   24-48 hours: <12.0 mg/dL   3-5 days:    <15.0 mg/dL   6-29 days:   <15.0 mg/dL   03/28/2025 0.8 0.1 - 1.0 mg/dL Final     Comment:     For infants and newborns, interpretation of results should be based   on gestational age, weight and in agreement with clinical   observations.    Premature Infant recommended reference ranges:   0-24 hours:  <8.0 mg/dL   24-48 hours: <12.0 mg/dL   3-5 days:    <15.0 mg/dL   6-29 days:   <15.0 mg/dL   03/27/2025 0.8 0.1 - 1.0 mg/dL Final     Comment:     For infants and newborns, interpretation of results should be based   on gestational age, weight and in agreement with clinical   observations.    Premature Infant recommended reference ranges:   0-24 hours:  <8.0 mg/dL   24-48 hours: <12.0 mg/dL   3-5 days:    <15.0 mg/dL   6-29 days:   <15.0 mg/dL   03/26/2025 1.0 0.1 - 1.0 mg/dL Final     Comment:     For infants and newborns, interpretation of results should be based   on gestational age, weight and in agreement with clinical   observations.    Premature Infant recommended reference ranges:   0-24  hours:  <8.0 mg/dL   24-48 hours: <12.0 mg/dL   3-5 days:    <15.0 mg/dL   6-29 days:   <15.0 mg/dL     ALP   Date Value Ref Range Status   04/21/2025 93 40 - 150 unit/L Final   03/28/2025 67 55 - 135 unit/L Final   03/27/2025 74 55 - 135 unit/L Final   03/26/2025 74 55 - 135 unit/L Final     AST   Date Value Ref Range Status   04/21/2025 16 11 - 45 unit/L Final   03/28/2025 16 10 - 40 unit/L Final   03/27/2025 14 10 - 40 unit/L Final   03/26/2025 13 10 - 40 unit/L Final     ALT   Date Value Ref Range Status   04/21/2025 14 10 - 44 unit/L Final   03/28/2025 8 (L) 10 - 44 unit/L Final   03/27/2025 7 (L) 10 - 44 unit/L Final   03/26/2025 7 (L) 10 - 44 unit/L Final     Anion Gap   Date Value Ref Range Status   04/21/2025 9 8 - 16 mmol/L Final   09/13/2023 9 8 - 16 mmol/L Final   03/10/2023 10 8 - 16 mmol/L Final   09/29/2022 9 8 - 16 mmol/L Final     eGFR if    Date Value Ref Range Status   05/17/2022 42 (A) >60 mL/min/1.73 m^2 Final   12/09/2021 42 (A) >60 mL/min/1.73 m^2 Final     eGFR if non    Date Value Ref Range Status   05/17/2022 36 (A) >60 mL/min/1.73 m^2 Final     Comment:     Calculation used to obtain the estimated glomerular filtration  rate (eGFR) is the CKD-EPI equation.      12/09/2021 36 (A) >60 mL/min/1.73 m^2 Final     Comment:     Calculation used to obtain the estimated glomerular filtration  rate (eGFR) is the CKD-EPI equation.        @labrcntip(troponini)@    NT-proBNP   Date Value Ref Range Status   03/25/2025 2,198 (H) <450 pg/mL Final     BNP   Date Value Ref Range Status   03/26/2025 657 (H) <=99 pg/mL Final     Comment:     Values of less than 100 pg/ml are consistent with non-CHF populations.   }   Lab Results   Component Value Date    CHOL 173 03/10/2023    CHOL 142 12/09/2021     Lab Results   Component Value Date    HDL 35 (L) 03/10/2023    HDL 38 (L) 12/09/2021     Lab Results   Component Value Date    LDLCALC 122.4 03/10/2023    LDLCALC 89.6 12/09/2021      Lab Results   Component Value Date    TRIG 78 03/10/2023    TRIG 72 12/09/2021     Lab Results   Component Value Date    CHOLHDL 20.2 03/10/2023    CHOLHDL 26.8 12/09/2021          Medication List with Changes/Refills   Current Medications    ALLOPURINOL (ZYLOPRIM) 300 MG TABLET    TAKE 1 TABLET BY MOUTH EVERY DAY    APIXABAN (ELIQUIS) 2.5 MG TAB    Take 1 tablet (2.5 mg total) by mouth 2 (two) times daily.    CHOLESTYRAMINE, WITH SUGAR, 4 GRAM POWD    Take 1 Scoop by mouth once daily.    FUROSEMIDE (LASIX) 20 MG TABLET    Take 1 tablet (20 mg total) by mouth once daily.    LATANOPROST 0.005 % OPHTHALMIC SOLUTION    1 drop.    PANTOPRAZOLE (PROTONIX) 40 MG TABLET    Take 1 tablet (40 mg total) by mouth once daily.    POLYETHYLENE GLYCOL (GLYCOLAX) 17 GRAM PWPK    Take 17 g by mouth once daily.    TAMSULOSIN (FLOMAX) 0.4 MG CAP    TAKE 1 CAPSULE BY MOUTH EVERY DAY    TRAVOPROST, BENZALKONIUM, (TRAVATAN) 0.004 % OPHTHALMIC SOLUTION    1 drop every evening.           Assessment & Plan     Assessment & Plan  Shortness of breath  Increasing over the past week.  History of anemia 1 month ago requiring blood transfusion.  Positive FOBT has follow up with Gastroenterology May 30, 2025.  I suspect recurrence of anemia we will check CBC.  Chest x-ray.    Orders:    X-Ray Chest PA And Lateral; Future    Pleural effusion  Previous chest x-ray reviewed, discharge chest x-ray improved from previous.  Repeat chest x-ray ordered       Anemia, unspecified type  Historical record reviewed.  Positive FOBT.  Require transfusion x1 unit 1 month ago.  We will check CBC, ferritin, TIBC and reticulocyte count    Orders:    CBC Auto Differential; Future    Ferritin; Future    Iron and TIBC; Future    Reticulocytes; Future    Paroxysmal atrial fibrillation  Patient has not followed up with Cardiology.  Is on anticoagulation, continue.  I do not suspect he is rate uncontrolled leading to his current symptoms.    Orders:    Comprehensive  Metabolic Panel; Future     All questions answered.  Discussed with the patient, grandson they understand and elects to proceed.     Daniele Weiss MD  [facility] 2nd Floor Family Medicine  [address] [place] [address],[place] [id number] [phone]

## 2025-05-12 ENCOUNTER — HOSPITAL ENCOUNTER (INPATIENT)
Facility: HOSPITAL | Age: OVER 89
LOS: 1 days | Discharge: HOSPICE | DRG: 291 | End: 2025-05-14
Attending: EMERGENCY MEDICINE
Payer: MEDICARE

## 2025-05-12 ENCOUNTER — OFFICE VISIT (OUTPATIENT)
Dept: CARDIOLOGY | Facility: CLINIC | Age: OVER 89
End: 2025-05-12
Payer: MEDICARE

## 2025-05-12 VITALS — OXYGEN SATURATION: 99 % | HEART RATE: 86 BPM | SYSTOLIC BLOOD PRESSURE: 102 MMHG | DIASTOLIC BLOOD PRESSURE: 63 MMHG

## 2025-05-12 DIAGNOSIS — R94.31 PROLONGED Q-T INTERVAL ON ECG: ICD-10-CM

## 2025-05-12 DIAGNOSIS — R00.2 PALPITATIONS: ICD-10-CM

## 2025-05-12 DIAGNOSIS — I48.0 PAROXYSMAL ATRIAL FIBRILLATION: ICD-10-CM

## 2025-05-12 DIAGNOSIS — E87.70 HYPERVOLEMIA, UNSPECIFIED HYPERVOLEMIA TYPE: ICD-10-CM

## 2025-05-12 DIAGNOSIS — N39.0 URINARY TRACT INFECTION WITHOUT HEMATURIA, SITE UNSPECIFIED: ICD-10-CM

## 2025-05-12 DIAGNOSIS — M79.89 LEG SWELLING: ICD-10-CM

## 2025-05-12 DIAGNOSIS — I50.9 CONGESTIVE HEART FAILURE, UNSPECIFIED HF CHRONICITY, UNSPECIFIED HEART FAILURE TYPE: Primary | ICD-10-CM

## 2025-05-12 DIAGNOSIS — M1A.9XX0 CHRONIC GOUTY ARTHROPATHY: ICD-10-CM

## 2025-05-12 PROBLEM — Z71.89 ADVANCED CARE PLANNING/COUNSELING DISCUSSION: Status: ACTIVE | Noted: 2025-05-12

## 2025-05-12 PROBLEM — Z71.89 ACP (ADVANCE CARE PLANNING): Status: ACTIVE | Noted: 2025-05-12

## 2025-05-12 PROBLEM — I48.91 ATRIAL FIBRILLATION: Status: ACTIVE | Noted: 2025-05-12

## 2025-05-12 PROBLEM — D50.9 IRON DEFICIENCY ANEMIA, UNSPECIFIED: Status: ACTIVE | Noted: 2022-11-10

## 2025-05-12 PROBLEM — I44.7 LBBB (LEFT BUNDLE BRANCH BLOCK): Status: ACTIVE | Noted: 2025-05-12

## 2025-05-12 LAB
ABSOLUTE EOSINOPHIL (SMH): 0.02 K/UL
ABSOLUTE MONOCYTE (SMH): 0.55 K/UL (ref 0.3–1)
ABSOLUTE NEUTROPHIL COUNT (SMH): 6.7 K/UL (ref 1.8–7.7)
ALBUMIN SERPL-MCNC: 3.6 G/DL (ref 3.5–5.2)
ALP SERPL-CCNC: 91 UNIT/L (ref 55–135)
ALT SERPL-CCNC: 45 UNIT/L (ref 10–44)
ANION GAP (SMH): 11 MMOL/L (ref 8–16)
APTT PPP: 27.7 SECONDS (ref 21–32)
AST SERPL-CCNC: 43 UNIT/L (ref 10–40)
BACTERIA #/AREA URNS AUTO: ABNORMAL /HPF
BASOPHILS # BLD AUTO: 0.01 K/UL
BASOPHILS NFR BLD AUTO: 0.1 %
BILIRUB SERPL-MCNC: 1.1 MG/DL (ref 0.1–1)
BILIRUB UR QL STRIP.AUTO: NEGATIVE
BNP SERPL-MCNC: 1745 PG/ML
BUN SERPL-MCNC: 64 MG/DL (ref 10–30)
CALCIUM SERPL-MCNC: 9.1 MG/DL (ref 8.7–10.5)
CHLORIDE SERPL-SCNC: 106 MMOL/L (ref 95–110)
CLARITY UR: ABNORMAL
CO2 SERPL-SCNC: 23 MMOL/L (ref 23–29)
COLOR UR AUTO: YELLOW
CREAT SERPL-MCNC: 2.2 MG/DL (ref 0.5–1.4)
ERYTHROCYTE [DISTWIDTH] IN BLOOD BY AUTOMATED COUNT: 21.5 % (ref 11.5–14.5)
GFR SERPLBLD CREATININE-BSD FMLA CKD-EPI: 26 ML/MIN/1.73/M2
GLUCOSE SERPL-MCNC: 90 MG/DL (ref 70–110)
GLUCOSE UR QL STRIP: NEGATIVE
HCT VFR BLD AUTO: 32.2 % (ref 40–54)
HGB BLD-MCNC: 8.9 GM/DL (ref 14–18)
HGB UR QL STRIP: NEGATIVE
HYALINE CASTS UR QL AUTO: 4 /LPF (ref 0–1)
IMM GRANULOCYTES # BLD AUTO: 0.04 K/UL (ref 0–0.04)
IMM GRANULOCYTES NFR BLD AUTO: 0.5 % (ref 0–0.5)
KETONES UR QL STRIP: NEGATIVE
LEUKOCYTE ESTERASE UR QL STRIP: ABNORMAL
LYMPHOCYTES # BLD AUTO: 0.63 K/UL (ref 1–4.8)
MAGNESIUM SERPL-MCNC: 2.2 MG/DL (ref 1.6–2.6)
MCH RBC QN AUTO: 21.5 PG (ref 27–31)
MCHC RBC AUTO-ENTMCNC: 27.6 G/DL (ref 32–36)
MCV RBC AUTO: 78 FL (ref 82–98)
MICROSCOPIC COMMENT: ABNORMAL
NITRITE UR QL STRIP: POSITIVE
NUCLEATED RBC (/100WBC) (SMH): 1 /100 WBC
PH UR STRIP: 5 [PH]
PLATELET # BLD AUTO: 251 K/UL (ref 150–450)
PMV BLD AUTO: 10.1 FL (ref 9.2–12.9)
POTASSIUM SERPL-SCNC: 4.2 MMOL/L (ref 3.5–5.1)
PROT SERPL-MCNC: 6.6 GM/DL (ref 6–8.4)
PROT UR QL STRIP: ABNORMAL
RBC # BLD AUTO: 4.14 M/UL (ref 4.6–6.2)
RBC #/AREA URNS AUTO: 1 /HPF
RELATIVE EOSINOPHIL (SMH): 0.3 % (ref 0–8)
RELATIVE LYMPHOCYTE (SMH): 8 % (ref 18–48)
RELATIVE MONOCYTE (SMH): 7 % (ref 4–15)
RELATIVE NEUTROPHIL (SMH): 84.1 % (ref 38–73)
SODIUM SERPL-SCNC: 140 MMOL/L (ref 136–145)
SP GR UR STRIP: 1.02
TROPONIN HIGH SENSITIVE (SMH): 18.6 PG/ML
TROPONIN HIGH SENSITIVE (SMH): 22 PG/ML
UROBILINOGEN UR STRIP-ACNC: NEGATIVE EU/DL
WBC # BLD AUTO: 7.91 K/UL (ref 3.9–12.7)
WBC #/AREA URNS AUTO: 54 /HPF
WBC CLUMPS UR QL AUTO: ABNORMAL

## 2025-05-12 PROCEDURE — 63600175 PHARM REV CODE 636 W HCPCS

## 2025-05-12 PROCEDURE — 25000003 PHARM REV CODE 250: Performed by: EMERGENCY MEDICINE

## 2025-05-12 PROCEDURE — G0378 HOSPITAL OBSERVATION PER HR: HCPCS

## 2025-05-12 PROCEDURE — 84484 ASSAY OF TROPONIN QUANT: CPT | Mod: 91

## 2025-05-12 PROCEDURE — 85025 COMPLETE CBC W/AUTO DIFF WBC: CPT | Performed by: NURSE PRACTITIONER

## 2025-05-12 PROCEDURE — 99215 OFFICE O/P EST HI 40 MIN: CPT | Mod: S$PBB,,, | Performed by: INTERNAL MEDICINE

## 2025-05-12 PROCEDURE — 96365 THER/PROPH/DIAG IV INF INIT: CPT

## 2025-05-12 PROCEDURE — 63600175 PHARM REV CODE 636 W HCPCS: Performed by: EMERGENCY MEDICINE

## 2025-05-12 PROCEDURE — 80053 COMPREHEN METABOLIC PANEL: CPT | Performed by: NURSE PRACTITIONER

## 2025-05-12 PROCEDURE — 83880 ASSAY OF NATRIURETIC PEPTIDE: CPT | Performed by: NURSE PRACTITIONER

## 2025-05-12 PROCEDURE — 83735 ASSAY OF MAGNESIUM: CPT | Performed by: INTERNAL MEDICINE

## 2025-05-12 PROCEDURE — 25000003 PHARM REV CODE 250

## 2025-05-12 PROCEDURE — 84484 ASSAY OF TROPONIN QUANT: CPT | Performed by: NURSE PRACTITIONER

## 2025-05-12 PROCEDURE — 99291 CRITICAL CARE FIRST HOUR: CPT

## 2025-05-12 PROCEDURE — 99213 OFFICE O/P EST LOW 20 MIN: CPT | Mod: PBBFAC,PN | Performed by: INTERNAL MEDICINE

## 2025-05-12 PROCEDURE — 87086 URINE CULTURE/COLONY COUNT: CPT | Performed by: NURSE PRACTITIONER

## 2025-05-12 PROCEDURE — 85730 THROMBOPLASTIN TIME PARTIAL: CPT | Performed by: INTERNAL MEDICINE

## 2025-05-12 PROCEDURE — 81001 URINALYSIS AUTO W/SCOPE: CPT | Performed by: NURSE PRACTITIONER

## 2025-05-12 PROCEDURE — 99999 PR PBB SHADOW E&M-EST. PATIENT-LVL III: CPT | Mod: PBBFAC,,, | Performed by: INTERNAL MEDICINE

## 2025-05-12 RX ORDER — SODIUM CHLORIDE 0.9 % (FLUSH) 0.9 %
10 SYRINGE (ML) INJECTION
Status: DISCONTINUED | OUTPATIENT
Start: 2025-05-12 | End: 2025-05-14 | Stop reason: HOSPADM

## 2025-05-12 RX ORDER — ACETAMINOPHEN 325 MG/1
650 TABLET ORAL EVERY 6 HOURS PRN
Status: DISCONTINUED | OUTPATIENT
Start: 2025-05-12 | End: 2025-05-14 | Stop reason: HOSPADM

## 2025-05-12 RX ORDER — LIDOCAINE 50 MG/G
1 PATCH TOPICAL
Status: DISCONTINUED | OUTPATIENT
Start: 2025-05-12 | End: 2025-05-14 | Stop reason: HOSPADM

## 2025-05-12 RX ORDER — TAMSULOSIN HYDROCHLORIDE 0.4 MG/1
1 CAPSULE ORAL NIGHTLY
Status: DISCONTINUED | OUTPATIENT
Start: 2025-05-12 | End: 2025-05-14 | Stop reason: HOSPADM

## 2025-05-12 RX ORDER — BISACODYL 10 MG/1
10 SUPPOSITORY RECTAL DAILY PRN
Status: DISCONTINUED | OUTPATIENT
Start: 2025-05-12 | End: 2025-05-14 | Stop reason: HOSPADM

## 2025-05-12 RX ORDER — ALLOPURINOL 300 MG/1
300 TABLET ORAL NIGHTLY
Status: DISCONTINUED | OUTPATIENT
Start: 2025-05-12 | End: 2025-05-14 | Stop reason: HOSPADM

## 2025-05-12 RX ORDER — FUROSEMIDE 10 MG/ML
40 INJECTION INTRAMUSCULAR; INTRAVENOUS
Status: COMPLETED | OUTPATIENT
Start: 2025-05-12 | End: 2025-05-12

## 2025-05-12 RX ORDER — CEFTRIAXONE 1 G/1
1 INJECTION, POWDER, FOR SOLUTION INTRAMUSCULAR; INTRAVENOUS ONCE
Status: COMPLETED | OUTPATIENT
Start: 2025-05-12 | End: 2025-05-12

## 2025-05-12 RX ORDER — LATANOPROST 50 UG/ML
1 SOLUTION/ DROPS OPHTHALMIC NIGHTLY
Status: DISCONTINUED | OUTPATIENT
Start: 2025-05-12 | End: 2025-05-14 | Stop reason: HOSPADM

## 2025-05-12 RX ORDER — CEFTRIAXONE 2 G/1
2 INJECTION, POWDER, FOR SOLUTION INTRAMUSCULAR; INTRAVENOUS
Status: DISCONTINUED | OUTPATIENT
Start: 2025-05-13 | End: 2025-05-14 | Stop reason: HOSPADM

## 2025-05-12 RX ORDER — CEFTRIAXONE 1 G/1
1 INJECTION, POWDER, FOR SOLUTION INTRAMUSCULAR; INTRAVENOUS
Status: COMPLETED | OUTPATIENT
Start: 2025-05-12 | End: 2025-05-12

## 2025-05-12 RX ORDER — PROMETHAZINE HYDROCHLORIDE 12.5 MG/1
12.5 TABLET ORAL EVERY 6 HOURS PRN
Status: DISCONTINUED | OUTPATIENT
Start: 2025-05-12 | End: 2025-05-14 | Stop reason: HOSPADM

## 2025-05-12 RX ORDER — DOCUSATE SODIUM 100 MG/1
100 CAPSULE, LIQUID FILLED ORAL EVERY MORNING
COMMUNITY

## 2025-05-12 RX ORDER — TALC
6 POWDER (GRAM) TOPICAL NIGHTLY PRN
Status: DISCONTINUED | OUTPATIENT
Start: 2025-05-12 | End: 2025-05-14 | Stop reason: HOSPADM

## 2025-05-12 RX ORDER — PANTOPRAZOLE SODIUM 40 MG/1
40 TABLET, DELAYED RELEASE ORAL DAILY
Status: DISCONTINUED | OUTPATIENT
Start: 2025-05-13 | End: 2025-05-14 | Stop reason: HOSPADM

## 2025-05-12 RX ORDER — DOCUSATE SODIUM 100 MG/1
100 CAPSULE, LIQUID FILLED ORAL EVERY MORNING
Status: DISCONTINUED | OUTPATIENT
Start: 2025-05-13 | End: 2025-05-14 | Stop reason: HOSPADM

## 2025-05-12 RX ADMIN — FUROSEMIDE 10 MG/HR: 10 INJECTION, SOLUTION INTRAMUSCULAR; INTRAVENOUS at 03:05

## 2025-05-12 RX ADMIN — TAMSULOSIN HYDROCHLORIDE 0.4 MG: 0.4 CAPSULE ORAL at 09:05

## 2025-05-12 RX ADMIN — APIXABAN 2.5 MG: 2.5 TABLET, FILM COATED ORAL at 09:05

## 2025-05-12 RX ADMIN — CHOLESTYRAMINE 4 GRAMS OF ANHYDROUS CHOLESTYRAMINE: 4 POWDER, FOR SUSPENSION ORAL at 09:05

## 2025-05-12 RX ADMIN — ALLOPURINOL 300 MG: 300 TABLET ORAL at 09:05

## 2025-05-12 RX ADMIN — CEFTRIAXONE 1 G: 1 INJECTION, POWDER, FOR SOLUTION INTRAMUSCULAR; INTRAVENOUS at 05:05

## 2025-05-12 RX ADMIN — CEFTRIAXONE 1 G: 1 INJECTION, POWDER, FOR SOLUTION INTRAMUSCULAR; INTRAVENOUS at 06:05

## 2025-05-12 RX ADMIN — FUROSEMIDE 40 MG: 10 INJECTION, SOLUTION INTRAMUSCULAR; INTRAVENOUS at 03:05

## 2025-05-12 NOTE — ASSESSMENT & PLAN NOTE
Anemia is likely due to Iron deficiency and chronic disease due to Chronic Kidney Disease. Most recent hemoglobin and hematocrit are listed below.  Recent Labs     05/12/25  1424   HGB 8.9*   HCT 32.2*     Plan  - Monitor serial CBC: Daily  - Transfuse PRBC if patient becomes hemodynamically unstable, symptomatic or H/H drops below 7/21.  - Patient has received  PRBCs on in the past  - Patient's anemia is currently improving

## 2025-05-12 NOTE — ED PROVIDER NOTES
Encounter Date: 5/12/2025       History     Chief Complaint   Patient presents with    Shortness of Breath     Increasing SOB and bilateral leg swelling since March.       Patient presents complaining of shortness a breath, increased swelling to the lower extremities.  Patient's soft his cardiologist today who advised admission to the hospital for [place] drip.  Dr. Jules called the ED asking for admission.      Review of patient's allergies indicates:   Allergen Reactions    Pcn [penicillins]      Past Medical History:   Diagnosis Date    Actinic keratosis     Benign prostatic hyperplasia     Chronic kidney disease     Stage 3     Fibromyositis     Gouty arthropathy     Hyperlipidemia     Hypertension     Osteoarthritis of knee      Past Surgical History:   Procedure Laterality Date    APPENDECTOMY      [email or web address]  02/21/2011    COLONOSCOPY      COLONOSCOPY N/A 08/01/2022    Procedure: COLONOSCOPY;  Surgeon: Edgar May MD;  Location: [facility];  Service: Endoscopy;  Laterality: N/A;    ESOPHAGOGASTRODUODENOSCOPY N/A 08/01/2022    Procedure: EGD (ESOPHAGOGASTRODUODENOSCOPY);  Surgeon: Edgar May MD;  Location: [facility];  Service: Endoscopy;  Laterality: N/A;    ESOPHAGOGASTRODUODENOSCOPY N/A 9/12/2022    Procedure: EGD (ESOPHAGOGASTRODUODENOSCOPY);  Surgeon: Edgar May MD;  Location: [facility];  Service: Endoscopy;  Laterality: N/A;    ESOPHAGOGASTRODUODENOSCOPY N/A 3/26/2025    Procedure: EGD (ESOPHAGOGASTRODUODENOSCOPY);  Surgeon: Roxana Stuart MD;  Location: [facility];  Service: Endoscopy;  Laterality: N/A;    UPPER GASTROINTESTINAL ENDOSCOPY       Family History   Problem Relation Name Age of Onset    Hypertension Mother      Coronary artery disease Mother      Colon cancer Neg Hx      Crohn's disease Neg Hx      Ulcerative colitis Neg Hx      Stomach cancer Neg Hx      Esophageal cancer Neg Hx       Social History[1]  Review of Systems   All other systems reviewed and are  negative.      Physical Exam     Initial Vitals [05/12/25 1242]   BP Pulse Resp Temp SpO2   103/65 104 (!) 28 98 °F (36.7 °C) 100 %      MAP       --         Physical Exam    Nursing note and vitals reviewed.  Constitutional: He appears well-developed and well-nourished. He is not diaphoretic. No distress.   Pleasant, polite.   HENT:   Head: Normocephalic and atraumatic.   Eyes: EOM are normal.   Neck: Neck supple.   Normal range of motion.  Cardiovascular:  Intact distal pulses.           Pulmonary/Chest: No respiratory distress.   Musculoskeletal:      Cervical back: Normal range of motion and neck supple.      Comments: There indeed is 2+ pitting edema to bilateral lower extremities.     Neurological: He is alert.   Skin: Skin is warm and dry.   Psychiatric: He has a normal mood and affect. His behavior is normal. Judgment and thought content normal.         ED Course   Procedures  Labs Reviewed   COMPREHENSIVE METABOLIC PANEL - Abnormal       Result Value    Sodium 140      Potassium 4.2      Chloride 106      CO2 23      Glucose 90      BUN 64 (*)     Creatinine 2.2 (*)     Calcium 9.1      Protein Total 6.6      Albumin 3.6      Bilirubin Total 1.1 (*)     ALP 91      AST 43 (*)     ALT 45 (*)     Anion Gap 11      eGFR 26 (*)    TROPONIN I HIGH SENSITIVITY - Abnormal    Troponin High Sensitive 22.0 (*)    B-TYPE NATRIURETIC PEPTIDE - Abnormal    BNP 1,745 (*)    URINALYSIS, REFLEX TO URINE CULTURE - Abnormal    Color, UA Yellow      Appearance, UA Hazy (*)     Spec Grav UA 1.020      pH, UA 5.0      Protein, UA Trace (*)     Glucose, UA Negative      Ketones, UA Negative      Blood, UA Negative      Bilirubin, UA Negative      Urobilinogen, UA Negative      Nitrites, UA Positive (*)     Leukocyte Esterase, UA 3+ (*)    CBC WITH DIFFERENTIAL - Abnormal    WBC 7.91      RBC 4.14 (*)     Hgb 8.9 (*)     Hct 32.2 (*)     MCV 78 (*)     MCH 21.5 (*)     MCHC 27.6 (*)     RDW 21.5 (*)     Platelet Count 251       MPV 10.1      Nucleated RBC 1 (*)     Neut % 84.1 (*)     Lymph % 8.0 (*)     Mono % 7.0      Eos % 0.3      Basophil % 0.1      Imm Grans % 0.5      Neut # 6.7      Lymph # 0.63 (*)     Mono # 0.55      Eos # 0.02      Baso # 0.01      Imm Grans # 0.04      Narrative:     Repeated and confirmed   URINALYSIS MICROSCOPIC - Abnormal    RBC, UA 1      WBC, UA 54 (*)     WBC Clumps, UA Few (*)     Bacteria, UA Occasional      Hyaline Casts, UA 4 (*)     Microscopic Comment       CULTURE, URINE   CBC W/ AUTO DIFFERENTIAL    Narrative:     The following orders were created for panel order CBC auto differential.  Procedure                               Abnormality         Status                     ---------                               -----------         ------                     CBC with Differential[[id number]]       Abnormal            Final result                 Please view results for these tests on the individual orders.   EXTRA TUBES    Narrative:     The following orders were created for panel order EXTRA TUBES.  Procedure                               Abnormality         Status                     ---------                               -----------         ------                     Light Blue Top Hold[[id number]]                             In process                 Gold Top Hold[[id number]]                                   In process                   Please view results for these tests on the individual orders.   LIGHT BLUE TOP HOLD   GOLD TOP HOLD          Imaging Results              X-Ray Chest AP Portable (Final result)  Result time 05/12/25 14:41:12      Final result by Marlen Cunningham MD (05/12/25 14:41:12)                   Impression:      Hypoinflation with cardiomegaly and small pleural effusions    Prominence of the pulmonary vasculature suggestive of edema.      Electronically signed by: Marlen Cunningham  Date:    05/12/2025  Time:    14:41               Narrative:    EXAMINATION:  XR  CHEST AP PORTABLE    CLINICAL HISTORY:  CHF;    FINDINGS:  Portable chest at 14:26 hours is compared to 04/21/2025 shows cardiomegaly.  There is hypoinflation.    There are small bilateral pleural effusions with bibasilar airspace disease.  There is prominence of the central pulmonary vasculature suggestive of edema.  No acute osseous abnormality.                                       US Lower Extremity Veins Bilateral (Final result)  Result time 05/12/25 13:59:33      Final result by Marlen Cunningham MD (05/12/25 13:59:33)                   Impression:      No evidence of bilateral lower extremity DVT      Electronically signed by: Marlen Cunningham  Date:    05/12/2025  Time:    13:59               Narrative:    EXAMINATION:  US LOWER EXTREMITY VEINS BILATERAL    CLINICAL HISTORY:  Other specified soft tissue disorders    COMPARISON:  None    FINDINGS:  Grayscale, color Doppler, and spectral Doppler analysis was performed.    Bilateral common femoral, femoral, popliteal, and proximal great saphenous veins show normal compressibility, augmentation, and color Doppler flow. Bilateral posterior tibial, anterior tibial, and peroneal veins are unremarkable.                                       Medications   furosemide (Lasix) 200 mg in 0.9% NaCl 100 mL infusion (conc: 2 mg/mL) (10 mg/hr Intravenous New Bag 5/12/25 1548)   cefTRIAXone injection 1 g (has no administration in time range)   furosemide injection 40 mg (40 mg Intravenous Given 5/12/25 1521)     Medical Decision Making  Patient presents complaining of shortness a breath and lower extremity edema sent from cardiologist's office for admission for Lasix drip    Considerations include volume overload, congestive heart failure, acute kidney injury, electrolyte abnormalities, dehydration    MDM    Patient presents for emergent evaluation of acute lower extremity edema that poses a possible threat to life and/or bodily function.    In the ED patient found to have  acute congestive heart failure, UTI.     Amount and/or complexity of data reviewed  I ordered labs and personally reviewed them.  Labs significant for troponin 22, BNP over 1700.    I ordered X-rays and personally reviewed them and reviewed the radiologist interpretation.  Xray significant for pleural effusions are present.    I ordered EKG and personally reviewed it.  EKG significant for irregularly irregular, no ST elevation, wide complex QRS.        I did review prior medical records.    Social determinants of health: patient is screened for food in security, housing instability, transportation needs, utility difficulties, interpersonal safety.  Determinants identified are none    Admission MDM and Risk  I discussed the patient presentation labs, ekg, X-rays, findings with the consultant(s) hospitalist   Patient was managed in the ED with IV Lasix.    The response to treatment was improved.    Shared decision-making with the patient regarding diagnosis, treatment, and plan was well received.    Patient required emergent consultation to hospitalist for admission.    Attending Critical Care:   Critical Care Times:   Direct Patient Care (initial evaluation, reassessments, and time considering the case)................................................................10 minutes.   Additional History from reviewing old medical records or taking additional history from the family, EMS, PCP, etc.......................10 minutes.   Ordering, Reviewing, and Interpreting Diagnostic Studies...............................................................................................................10 minutes.   Documentation..................................................................................................................................................................................5 minutes.   ==============================================================  · Total Critical Care Time - exclusive of  procedural time: 35 minutes.  ==============================================================  Critical care was necessary to treat or prevent imminent or life-threatening deterioration of the following conditions:  Volume overload, Lasix drip.   Critical care was time spent personally by me on the following activities: obtaining history from patient or relative, examination of patient, review of x-rays / CT sent with the patient, ordering lab, x-rays, and/or EKG, development of treatment plan with patient or relative, ordering and performing treatments and interventions, interpretation of cardiac measurements and re-evaluation of patient's condition.   Critical Care Condition: potentially life-threatening     Amount and/or Complexity of Data Reviewed  Labs:  Decision-making details documented in ED Course.    Risk  Prescription drug management.  Decision regarding hospitalization.               ED Course as of 05/12/25 1629   Mon May 12, 2025   1519 Troponin I High Sensitivity(!): 22.0 [AP]   1520 BNP(!): 1,745 [AP]   1520 Hematocrit(!): 32.2 [AP]   1520 Platelet Count: 251 [AP]   1520 Sodium: 140 [AP]   1520 Potassium: 4.2 [AP]   1520 Chloride: 106 [AP]   1520 Glucose: 90 [AP]   1520 PROTEIN TOTAL: 6.6 [AP]   1520 BILIRUBIN TOTAL(!): 1.1 [AP]   1520 ALP: 91 [AP]   1520 AST(!): 43 [AP]   1520 ALT(!): 45 [AP]   1520 eGFR(!): 26 [AP]   1520 Leukocyte Esterase, UA(!): 3+ [AP]   1520 NITRITE UA(!): Positive [AP]   1624 RBC, UA: 1 [AP]   1624 WBC, UA(!): 54 [AP]   1624 WBC Clumps, UA(!): Few [AP]      ED Course User Index  [AP] Alexander Ellison MD                           Clinical Impression:  Final diagnoses:  [R00.2] Palpitations  [M79.89] Leg swelling  [I50.9] Congestive heart failure, unspecified HF chronicity, unspecified heart failure type (Primary)  [E87.70] Hypervolemia, unspecified hypervolemia type          ED Disposition Condition    Admit                   [1]   Social History  Tobacco Use    Smoking status:  Former     Current packs/day: 0.00     Types: Cigarettes     Quit date:      Years since quittin.3    Smokeless tobacco: Former     Types: Chew   Substance Use Topics    Alcohol use: Yes     Comment: occasionally    Drug use: Never        Alexander Ellison MD  [phone]

## 2025-05-12 NOTE — FIRST PROVIDER EVALUATION
Emergency Department TeleTriage Encounter Note      CHIEF COMPLAINT    Chief Complaint   Patient presents with    Shortness of Breath     Increasing SOB and bilateral leg swelling since March.         VITAL SIGNS   Initial Vitals [05/12/25 1242]   BP Pulse Resp Temp SpO2   103/65 104 (!) 28 98 °F (36.7 °C) 100 %      MAP       --            ALLERGIES    Review of patient's allergies indicates:   Allergen Reactions    Pcn [penicillins]        PROVIDER TRIAGE NOTE  Verbal consent for the teletriage evaluation was given by the patient at the start of the evaluation.  All efforts will be made to maintain patient's privacy during the evaluation.      This is a teletriage evaluation of a 89 y.o. male presenting to the ED per grandson with c/o SOB and BLE since March; sent in by Cardiology. Limited physical exam via telehealth: The patient is awake, alert, and is not in respiratory distress.  As the Teletriage provider, I performed an initial assessment and ordered appropriate labs and imaging studies, if any, to facilitate the patient's care once placed in the ED. Once a room is available, care and a full evaluation will be completed by an alternate ED provider.  Any additional orders and the final disposition will be determined by that provider.  All imaging and labs will not be followed-up by the Teletriage Team, including myself.      ORDERS  Labs Reviewed - No data to display    ED Orders (720h ago, onward)      Start Ordered     Status Ordering Provider    05/12/25 1252 05/12/25 1252  Cardiac Monitoring - Adult  Continuous        Comments: Notify Physician If:    Ordered RANDOLPH JAMES    05/12/25 1252 05/12/25 1252  Pulse Oximetry Continuous  Continuous         Ordered RANDOLPH JAMES    05/12/25 1237 05/12/25 1237  EKG 12-lead  Once         Acknowledged WHIT CAMARILLO    Unscheduled 05/12/25 1252  Confirm Patient is not Eligible for Congestive Heart Failure Pathway  Until discontinued         Ordered RANDOLPH JAMES     Unscheduled 05/12/25 1252  Vital signs  Every 30 min         Ordered RANDOLPH JAMES    Unscheduled 05/12/25 1252  Insert peripheral IV  Once         Ordered RANDOLPH JAMES    Unscheduled 05/12/25 1252  CBC auto differential  STAT         Ordered RANDOLPH JAMES    Unscheduled 05/12/25 1252  Comprehensive metabolic panel  STAT         Ordered RANDOLPH JAMES    Unscheduled 05/12/25 1252  Troponin I High Sensitivity  STAT         Ordered RANDOLPH JAMES    Unscheduled 05/12/25 1252  Brain natriuretic peptide  STAT         Ordered RANDOLPH JAMES    Unscheduled 05/12/25 1252  X-Ray Chest AP Portable  1 time imaging         Ordered RANDOLPH JAMES    Unscheduled 05/12/25 1252  Urinalysis, Reflex to Urine Culture Urine, Clean Catch  STAT         Ordered RANDOLPH JAMES    Unscheduled 05/12/25 1252  US Lower Extremity Veins Bilateral  1 time imaging         Ordered RANDOLPH JAMES    Unscheduled 05/12/25 1252  Possible Hospitalization  Once        Comments: For further elaboration on reason for hospitalization.    Ordered RANDOLPH JAMES              Virtual Visit Note: The provider triage portion of this emergency department evaluation and documentation was performed via "[employer] a HIPAA-compliant telemedicine application, in concert with a tele-presenter in the room. A face to face patient evaluation with one of my colleagues will occur once the patient is placed in an emergency department room.      DISCLAIMER: This note was prepared with [employer] voice recognition transcription software. Garbled syntax, mangled pronouns, and other bizarre constructions may be attributed to that software system.

## 2025-05-12 NOTE — ASSESSMENT & PLAN NOTE
Patient is identified as having Diastolic (HFpEF) heart failure that is Acute on Chronic. CHF is currently uncontrolled due to volume overload due to: Continued edema of extremities, Rales/crackles on pulmonary exam, and Pulmonary edema/pleural effusion on CXR. Latest ECHO performed and demonstrates- Results for orders placed during the hospital encounter of 03/25/25    Echo    Interpretation Summary    Left Ventricle: Left ventricle was not well visualized due to poor sonic window. The left ventricle is at the upper limits of normal in size. Increased wall thickness. There is concentric hypertrophy. Septal motion is consistent with bundle branch block. There is low normal systolic function with a visually estimated ejection fraction of 50 - 55%. Unable to assess diastolic function due to atrial fibrillation.    Right Ventricle: The right ventricle is normal in size. Systolic function is normal.    Aortic Valve: The aortic valve is a trileaflet valve. There is severe aortic valve sclerosis. Moderately calcified right cusp. Moderately restricted motion. There is mild stenosis. Aortic valve area by VTI is 1.2 cm². Aortic valve area by velocity is 1.1 cm². Aortic valve peak velocity is 2.4 m/s. Mean gradient is 13 mmHg. Aortic Valve peak gradient is 23 mmHg. The dimensionless index is 0.48. There is mild aortic regurgitation.    Mitral Valve: There is mild to moderate regurgitation.    Tricuspid Valve: There is moderate regurgitation. There is mild pulmonary hypertension. The estimated PA systolic pressure is at least 44 mmHg.  . Continue Furosemide and monitor clinical status closely. Monitor on telemetry. Patient is on CHF pathway.  Monitor strict Is&Os and daily weights.  Place on fluid restriction of 1.5 L. Cardiology is consulted. Continue to stress to patient importance of self efficacy and  on diet for CHF. Last BNP reviewed- and noted below   Recent Labs   Lab 05/12/25  1424   BNP 1,745*   .

## 2025-05-12 NOTE — PROGRESS NOTES
Subjective:    Patient ID:  Vel Banda is a 89 y.o. male patient here for evaluation Follow-up (NEW PATIENT)      History of Present Illness:   89-year-old gentleman with complex medical problems including chronic kidney disease has been developed increasing swelling in both lower extremities progressive shortness of breath he has diagnosis of atrial fibrillation with heart rates in the 96 beats per minute.  He has been very reluctant to get to the hospital he has been taking Lasix at home in the last couple of days the dose was started up to 40 mg a day.  He has severe chronic persistent iron-deficiency anemia and is taking oral iron with no significant change in anemia and hemoglobin count.  No active blood loss noted lately  Patient denies having chest discomfort  He has been noticing severely diminished hearing bilaterally and in trying to use different hearing aids with no results.      Review of patient's allergies indicates:   Allergen Reactions    Pcn [penicillins]        Past Medical History:   Diagnosis Date    Actinic keratosis     Benign prostatic hyperplasia     Chronic kidney disease     Stage 3     Fibromyositis     Gouty arthropathy     Hyperlipidemia     Hypertension     Osteoarthritis of knee      Past Surgical History:   Procedure Laterality Date    APPENDECTOMY      cataractsx2  02/21/2011    COLONOSCOPY      COLONOSCOPY N/A 08/01/2022    Procedure: COLONOSCOPY;  Surgeon: Edgar May MD;  Location: [facility];  Service: Endoscopy;  Laterality: N/A;    ESOPHAGOGASTRODUODENOSCOPY N/A 08/01/2022    Procedure: EGD (ESOPHAGOGASTRODUODENOSCOPY);  Surgeon: Edgar May MD;  Location: [facility];  Service: Endoscopy;  Laterality: N/A;    ESOPHAGOGASTRODUODENOSCOPY N/A 9/12/2022    Procedure: EGD (ESOPHAGOGASTRODUODENOSCOPY);  Surgeon: Edgar May MD;  Location: [facility];  Service: Endoscopy;  Laterality: N/A;    ESOPHAGOGASTRODUODENOSCOPY N/A 3/26/2025    Procedure: EGD  (ESOPHAGOGASTRODUODENOSCOPY);  Surgeon: Roxana Stuart MD;  Location: [facility];  Service: Endoscopy;  Laterality: N/A;    UPPER GASTROINTESTINAL ENDOSCOPY       Social History[1]     Review of Systems   As noted in HPI in addition     Constitutional: Negative for chills, no fever but generalized fatigue and tiredness   Eyes: No double vision, No blurred vision  Markedly diminished hearing bilaterally  Neuro: No headaches, No dizziness  Respiratory:  Progressive shortness of breath   Cardiovascular: Negative for chest pain. Negative for palpitations significant swelling in both lower has been disease extending up to the thighs and abdomen   Gastrointestinal: Negative for abdominal pain, No melena, diarrhea, nausea and vomiting.  Cutaneous edema and abdominal girth increase  Genitourinary: Negative for dysuria and frequency, Negative for hematuria  Skin: Negative for bruising, Negative for edema or discoloration noted.   Endocrine: Negative for polyphagia, Negative for heat intolerance, Negative for cold intolerance  Psychiatric: Negative for depression, Negative for anxiety, Negative for memory loss  Musculoskeletal:  Generalized musculoskeletal weakness     Objective        Vitals:    05/12/25 1113   BP: 102/63   Pulse: 86       LIPIDS - LAST 2   Lab Results   Component Value Date    CHOL 173 03/10/2023    CHOL 142 12/09/2021    HDL 35 (L) 03/10/2023    HDL 38 (L) 12/09/2021    LDLCALC 122.4 03/10/2023    LDLCALC 89.6 12/09/2021    TRIG 78 03/10/2023    TRIG 72 12/09/2021    CHOLHDL 20.2 03/10/2023    CHOLHDL 26.8 12/09/2021       CBC - LAST 2  Lab Results   Component Value Date    WBC 7.98 04/21/2025    WBC 8.39 03/29/2025    RBC 3.55 (L) 04/21/2025    RBC 3.23 (L) 03/29/2025    HGB 8.1 (L) 04/21/2025    HGB 8.0 (L) 03/29/2025    HCT 27.6 (L) 04/21/2025    HCT 25.8 (L) 03/29/2025    MCV 78 (L) 04/21/2025    MCV 80 (L) 03/29/2025    MCH 22.8 (L) 04/21/2025    MCH 24.8 (L) 03/29/2025    MCHC 29.3 (L) 04/21/2025  "   MCHC 31.0 (L) 03/29/2025    RDW 20.5 (H) 04/21/2025    RDW 20.1 (H) 03/29/2025     04/21/2025     03/29/2025    MPV 10.0 04/21/2025    MPV 10.0 03/29/2025    GRAN 3.9 09/13/2023    GRAN 57.2 09/13/2023    LYMPH 7.5 (L) 04/21/2025    LYMPH 0.60 (L) 04/21/2025    MONO 7.8 04/21/2025    MONO 0.62 04/21/2025    BASO 0.07 09/13/2023    BASO 0.07 03/06/2023    NRBC 1 (H) 04/21/2025    NRBC 0 03/29/2025       CHEMISTRY & LIVER FUNCTION - LAST 2  Lab Results   Component Value Date     04/21/2025     03/29/2025    K 4.4 04/21/2025    K 3.7 03/29/2025     04/21/2025     03/29/2025    CO2 23 04/21/2025    CO2 24 03/29/2025    ANIONGAP 9 04/21/2025    ANIONGAP 9 03/29/2025    BUN 47 (H) 04/21/2025    BUN 49 (H) 03/29/2025    CREATININE 2.0 (H) 04/21/2025    CREATININE 1.9 (H) 03/29/2025     04/21/2025    GLU 92 03/29/2025    CALCIUM 8.7 04/21/2025    CALCIUM 8.6 (L) 03/29/2025    MG 1.9 03/28/2025    MG 1.9 03/27/2025    ALBUMIN 3.0 (L) 04/21/2025    ALBUMIN 3.2 (L) 03/28/2025    PROT 6.6 04/21/2025    PROT 5.9 (L) 03/28/2025    ALKPHOS 93 04/21/2025    ALKPHOS 67 03/28/2025    ALT 14 04/21/2025    ALT 8 (L) 03/28/2025    AST 16 04/21/2025    AST 16 03/28/2025    BILITOT 0.6 04/21/2025    BILITOT 0.8 03/28/2025        CARDIAC PROFILE - LAST 2  Lab Results   Component Value Date     (H) 03/26/2025    BNP 2,198 (H) 03/25/2025    TROPONINIHS 15 03/25/2025    TROPONINIHS 16 03/25/2025        COAGULATION - LAST 2  No results found for: "LABPT", "INR", "APTT"    ENDOCRINE & PSA - LAST 2  Lab Results   Component Value Date    HGBA1C 4.6 09/13/2023    HGBA1C 4.5 03/10/2023    TSH 0.749 03/25/2025    TSH 1.525 03/10/2023    PSA 13.2 (H) 03/10/2023        ECHOCARDIOGRAM RESULTS  Results for orders placed during the hospital encounter of 03/25/25    Echo    Interpretation Summary    Left Ventricle: Left ventricle was not well visualized due to poor sonic window. The left ventricle is " at the upper limits of normal in size. Increased wall thickness. There is concentric hypertrophy. Septal motion is consistent with bundle branch block. There is low normal systolic function with a visually estimated ejection fraction of 50 - 55%. Unable to assess diastolic function due to atrial fibrillation.    Right Ventricle: The right ventricle is normal in size. Systolic function is normal.    Aortic Valve: The aortic valve is a trileaflet valve. There is severe aortic valve sclerosis. Moderately calcified right cusp. Moderately restricted motion. There is mild stenosis. Aortic valve area by VTI is 1.2 cm². Aortic valve area by velocity is 1.1 cm². Aortic valve peak velocity is 2.4 m/s. Mean gradient is 13 mmHg. Aortic Valve peak gradient is 23 mmHg. The dimensionless index is 0.48. There is mild aortic regurgitation.    Mitral Valve: There is mild to moderate regurgitation.    Tricuspid Valve: There is moderate regurgitation. There is mild pulmonary hypertension. The estimated PA systolic pressure is at least 44 mmHg.      CURRENT/PREVIOUS VISIT EKG  Results for orders placed or performed during the hospital encounter of 03/25/25   EKG 12-lead    Collection Time: 03/25/25  4:04 PM   Result Value Ref Range    QRS Duration 160 ms    [facility] QTC Calculation 492 ms    Narrative    Test Reason : R06.02,    Vent. Rate :  91 BPM     Atrial Rate :    BPM     P-R Int :    ms          QRS Dur : 160 ms      QT Int : 400 ms       P-R-T Axes :    -39 115 degrees    QTcB Int : 492 ms    Atrial fibrillation  Left axis deviation  Left bundle branch block  Abnormal ECG  No previous ECGs available  Confirmed by Abraham Tomas ([id number]) on 3/26/2025 1:03:09 PM    Referred By: AAAREFERRAL SELF           Confirmed By: Abraham Tomas     No valid procedures specified.   No results found for this or any previous visit.    No valid procedures specified.          PREVIOUS STRESS TEST              PREVIOUS ANGIOGRAM        PHYSICAL  EXAM    GENERAL: well built, well nourished, well-developed in no apparent distress alert and oriented.   HEENT: Normocephalic significant generalized pallor   NECK: No JVD. No bruit..   THYROID: Thyroid not enlarged. No nodules present..   CARDIAC:  Irregular rhythm no gallop,, systolic murmur present   CHEST ANATOMY: normal.   LUNGS:  Coarse rhonchi bilaterally diminished breath sounds   ABDOMEN: Soft no masses or organomegaly.  Positive for abdominal distention.     No abdomen pulsations or bruits.  Normal bowel sounds. No pulsations and no masses felt, No guarding or rebound.   EXTREMITIES:  Significant bilateral edema 3 to 4+ upper thigh    CENTRAL NERVOUS SYSTEM:  A detailed exam not performed   SKIN: Skin without lesions, moist, well perfused.   MUSCLE STRENGTH & TONE:  Not tested    I HAVE REVIEWED :    The vital signs, nurses notes, and all the pertinent radiology and labs.    EKG shows atrial fibrillation with ventricular premature complexes, isolated and underlying left bundle branch block morphology    Current Outpatient Medications   Medication Instructions    allopurinoL (ZYLOPRIM) 300 mg, Oral    apixaban (ELIQUIS) 2.5 mg, Oral, 2 times daily    cholestyramine, with sugar, 4 gram Powd 1 Scoop, Oral, Daily    furosemide (LASIX) 20 MG tablet Take one of these addl Lasix 20mg tablets with your regularly scheduled Lasix tabs daily for 10 days, then return to Lasix 20mg daily.    furosemide (LASIX) 20 mg, Oral, Daily    latanoprost 0.005 % ophthalmic solution 1 drop    pantoprazole (PROTONIX) 40 mg, Oral, Daily    polyethylene glycol (GLYCOLAX) 17 g, Oral, Daily    tamsulosin (FLOMAX) 0.4 mg, Oral    travoprost, benzalkonium, (TRAVATAN) 0.004 % ophthalmic solution 1 drop, Nightly          Assessment & Plan     1. Paroxysmal atrial fibrillation  -     Ambulatory referral/consult to Cardiology     1. Persistent atrial fibrillation  2. Chronic congestive heart failure with acute decompensation   3. Chronic  kidney disease      4. Aortic valve stenosis   5. Dyslipidemia  6. Chronic oral anticoagulation therapy   7. Left bundle branch block morphology   8. Significant iron-deficiency anemia    RECOMMENDATIONS:    1. Recommend hospitalization.    Patient has been reluctant to do this however with further convincing he is willing to give us a short stay.    He is willing to go to the emergency room  To optimize therapy recommend the following   1. Intravenous Lasix therapy at 10 mg an hour  2. Recommend fairly infusion at 250 mg for 3 days at least  4. Continue on Eliquis at 2.5 mg p.o. b.i.d.  5. His last ejection fraction was 55%.  Therefore he may not need any inotropic infusion  6. After initial diuresis with intravenous Lasix consider changing it to torsemide 20 mg twice a day daily  7. Recommend cardiology follow-up in his hospital        No follow-ups on file.              [1]   Social History  Tobacco Use    Smoking status: Former     Current packs/day: 0.00     Types: Cigarettes     Quit date:      Years since quittin.3    Smokeless tobacco: Former     Types: Chew   Substance Use Topics    Alcohol use: Yes     Comment: occasionally    Drug use: Never

## 2025-05-12 NOTE — ASSESSMENT & PLAN NOTE
Creatine stable for now. CMP reviewed- noted Estimated Creatinine Clearance: 19.1 mL/min (A) (based on SCr of 2.2 mg/dL (H)). according to latest data. Based on current GFR, CKD stage is stage 3 - GFR 30-59.  Monitor UOP and serial CMP and adjust therapy as needed. Renally dose meds. Avoid nephrotoxic medications and procedures.

## 2025-05-12 NOTE — HPI
89-year-old male presented to ED from Cardiology office for eval of fluid overload.  pMHx AF, HF, CKD, aortic valve stenosis, HLD, LBBB, anemia, gout, BPH.  Patient evaluated by Cardiology outpatient earlier today for shortness of breath and lower extremity edema that has been progressively worsening since March of this year, patient subsequently advised to go to ED for diuresis and Cardiology follow-up.  Patient has history of HFpEF, echo 3/25/2025 with EF 50-55%, on Lasix outpatient, patient's grandson at bedside reports compliance with Lasix, states patient is still not diuresing well even if given extra dose of p.o. Lasix.  Patient also with history of AF, on Eliquis outpatient.  It is reported patient has been reluctant to continue attending outpatient doctor visits, discussed hospice consult on exam, patient family are agreeable to hospice consult but would like to continue with medical treatment at present and in the interim.  In ED today, BNP 1745.  Initial troponin 72.  UA with UTI. CXR impression with hypoinflation with cardiomegaly and small pleural effusions; prominence of the pulmonary vasculature suggestive of edema. BLE venous [place] impression with no evidence of DVT.  Per Cardiology recs patient given Lasix x1 IVP and gtt started admit to hospital medicine for further eval.

## 2025-05-12 NOTE — ASSESSMENT & PLAN NOTE
Patient has persistent (7 days or more) atrial fibrillation. Patient is currently in atrial fibrillation. [id number] Score: 2. The patients heart rate in the last 24 hours is as follows:  Pulse  Min: 86  Max: 104     Antiarrhythmics       Anticoagulants  apixaban tablet 2.5 mg, 2 times daily, Oral    Plan  - Replete lytes with a goal of K>4, Mg >2  - Patient is anticoagulated, see medications listed above.  - Patient's afib is currently controlled  - Monitor coags

## 2025-05-12 NOTE — SUBJECTIVE & OBJECTIVE
Past Medical History:   Diagnosis Date    Actinic keratosis     Benign prostatic hyperplasia     Chronic kidney disease     Stage 3     Fibromyositis     Gouty arthropathy     Hyperlipidemia     Hypertension     Osteoarthritis of knee        Past Surgical History:   Procedure Laterality Date    APPENDECTOMY      cataractsx2  02/21/2011    COLONOSCOPY      COLONOSCOPY N/A 08/01/2022    Procedure: COLONOSCOPY;  Surgeon: Edgar May MD;  Location: [facility];  Service: Endoscopy;  Laterality: N/A;    ESOPHAGOGASTRODUODENOSCOPY N/A 08/01/2022    Procedure: EGD (ESOPHAGOGASTRODUODENOSCOPY);  Surgeon: Edgar May MD;  Location: [facility];  Service: Endoscopy;  Laterality: N/A;    ESOPHAGOGASTRODUODENOSCOPY N/A 9/12/2022    Procedure: EGD (ESOPHAGOGASTRODUODENOSCOPY);  Surgeon: Edgar May MD;  Location: [facility];  Service: Endoscopy;  Laterality: N/A;    ESOPHAGOGASTRODUODENOSCOPY N/A 3/26/2025    Procedure: EGD (ESOPHAGOGASTRODUODENOSCOPY);  Surgeon: Roxana Stuart MD;  Location: [facility];  Service: Endoscopy;  Laterality: N/A;    UPPER GASTROINTESTINAL ENDOSCOPY         Review of patient's allergies indicates:   Allergen Reactions    Pcn [penicillins]        No current facility-administered medications on file prior to encounter.     Current Outpatient Medications on File Prior to Encounter   Medication Sig    allopurinoL (ZYLOPRIM) 300 MG tablet TAKE 1 TABLET BY MOUTH EVERY DAY (Patient taking differently: Take 300 mg by mouth nightly.)    antiox.mv no.10/omeg3s/lut/castro (I-CAPS ORAL) Take 1 tablet by mouth every evening.    apixaban (ELIQUIS) 2.5 mg Tab Take 1 tablet (2.5 mg total) by mouth 2 (two) times daily.    cholestyramine, with sugar, 4 gram Powd Take 1 Scoop by mouth once daily. (Patient taking differently: Take 1 Scoop by mouth every morning.)    docusate sodium (COLACE) 100 MG capsule Take 100 mg by mouth every morning.    furosemide (LASIX) 20 MG tablet Take 1 tablet (20 mg total)  by mouth once daily. (Patient taking differently: Take 40 mg by mouth once daily.)    latanoprost 0.005 % ophthalmic solution Place 1 drop into both eyes every evening.    pantoprazole (PROTONIX) 40 MG tablet Take 1 tablet (40 mg total) by mouth once daily.    polyethylene glycol (GLYCOLAX) 17 gram PwPk Take 17 g by mouth once daily. (Patient taking differently: Take 17 g by mouth daily as needed for Constipation.)    tamsulosin (FLOMAX) 0.4 mg Cap TAKE 1 CAPSULE BY MOUTH EVERY DAY (Patient taking differently: Take 1 capsule by mouth nightly.)    furosemide (LASIX) 20 MG tablet Take one of these addl Lasix 20mg tablets with your regularly scheduled Lasix tabs daily for 10 days, then return to Lasix 20mg daily.    [DISCONTINUED] travoprost, benzalkonium, (TRAVATAN) 0.004 % ophthalmic solution 1 drop every evening.     Family History       Problem Relation (Age of Onset)    Coronary artery disease Mother    Hypertension Mother          Tobacco Use    Smoking status: Former     Current packs/day: 0.00     Types: Cigarettes     Quit date:      Years since quittin.3    Smokeless tobacco: Former     Types: Chew   Substance and Sexual Activity    Alcohol use: Yes     Comment: occasionally    Drug use: Never    Sexual activity: Not Currently     Review of Systems   Constitutional:  Positive for fatigue. Negative for activity change, chills and fever.   HENT:  Negative for congestion and sore throat.    Respiratory:  Positive for shortness of breath.    Cardiovascular:  Positive for leg swelling. Negative for chest pain.   Gastrointestinal:  Negative for abdominal pain, constipation, diarrhea, nausea and vomiting.   Genitourinary:  Positive for decreased urine volume. Negative for flank pain and hematuria.   Neurological:  Positive for weakness.   Psychiatric/Behavioral:  Negative for behavioral problems.      Objective:     Vital Signs (Most Recent):  Temp: 98 °F (36.7 °C) (25 1242)  Pulse: 91 (25  "1527)  Resp: 15 (05/12/25 1527)  BP: 136/62 (05/12/25 1527)  SpO2: 97 % (05/12/25 1527) Vital Signs (24h Range):  Temp:  [98 °F (36.7 °C)] 98 °F (36.7 °C)  Pulse:  [] 91  Resp:  [15-28] 15  SpO2:  [97 %-100 %] 97 %  BP: (102-136)/(62-65) 136/62     Weight: 84.8 kg (187 lb)  Body mass index is 30.18 kg/m².     Physical Exam  Vitals reviewed.   Constitutional:       General: He is sleeping. He is not in acute distress.     Appearance: He is ill-appearing.   HENT:      Head: Normocephalic and atraumatic.      Nose: Nose normal.      Mouth/Throat:      Mouth: Mucous membranes are dry.   Eyes:      Conjunctiva/sclera: Conjunctivae normal.   Cardiovascular:      Rate and Rhythm: Normal rate. Rhythm irregular.   Pulmonary:      Effort: Pulmonary effort is normal. No respiratory distress.      Breath sounds: Rales present.   Abdominal:      General: Bowel sounds are normal.      Palpations: Abdomen is soft.      Tenderness: There is no abdominal tenderness.   Musculoskeletal:         General: No tenderness. Normal range of motion.      Cervical back: Normal range of motion.      Right lower leg: Edema present.      Left lower leg: Edema present.   Skin:     General: Skin is warm and dry.      Coloration: Skin is pale.   Neurological:      Mental Status: He is easily aroused. Mental status is at baseline.                Significant Labs: All pertinent labs within the past 24 hours have been reviewed.  CBC:   Recent Labs   Lab 05/12/25  1424   WBC 7.91   HGB 8.9*   HCT 32.2*        CMP:   Recent Labs   Lab 05/12/25  1424      K 4.2      CO2 23   GLU 90   BUN 64*   CREATININE 2.2*   CALCIUM 9.1   PROT 6.6   ALBUMIN 3.6   BILITOT 1.1*   ALKPHOS 91   AST 43*   ALT 45*   ANIONGAP 11     Cardiac Markers:   Recent Labs   Lab 05/12/25  1424   BNP 1,745*     Coagulation: No results for input(s): "PT", "INR", "APTT" in the last 48 hours.  Troponin: No results for input(s): "TROPONINI", "TROPONINIHS" in the " last 48 hours.  TSH:   Recent Labs   Lab 03/25/25  1632   TSH 0.749     Urine Studies:   Recent Labs   Lab 05/12/25  1424   APPEARANCEUA Hazy*   SPECGRAV 1.020   PROTEINUA Trace*   BILIRUBINUA Negative   UROBILINOGEN Negative   LEUKOCYTESUR 3+*   RBCUA 1   WBCUA 54*   BACTERIA Occasional       Significant Imaging: I have reviewed all pertinent imaging results/findings within the past 24 hours.

## 2025-05-12 NOTE — H&P
[facility] - Emergency Dept  Hospital Medicine  History & Physical    Patient Name: Vel Banda  MRN: [id number]  Patient Class: OP- Observation  Admission Date: 5/12/2025  Attending Physician: Dev Argueta MD   Primary Care Provider: Thalia Felton MD         Patient information was obtained from patient, relative(s), past medical records, and ER records.     Subjective:     Principal Problem:Acute on chronic heart failure    Chief Complaint:   Chief Complaint   Patient presents with    Shortness of Breath     Increasing SOB and bilateral leg swelling since March.          HPI: 89-year-old male presented to ED from Cardiology office for eval of fluid overload.  pMHx AF, HF, CKD, aortic valve stenosis, HLD, LBBB, anemia, gout, BPH.  Patient evaluated by Cardiology outpatient earlier today for shortness of breath and lower extremity edema that has been progressively worsening since March of this year, patient subsequently advised to go to ED for diuresis and Cardiology follow-up.  Patient has history of HFpEF, echo 3/25/2025 with EF 50-55%, on Lasix outpatient, patient's grandson at bedside reports compliance with Lasix, states patient is still not diuresing well even if given extra dose of p.o. Lasix.  Patient also with history of AF, on Eliquis outpatient.  It is reported patient has been reluctant to continue attending outpatient doctor visits, discussed hospice consult on exam, patient family are agreeable to hospice consult but would like to continue with medical treatment at present and in the interim.  In ED today, BNP 1745.  Initial troponin 72.  UA with UTI. CXR impression with hypoinflation with cardiomegaly and small pleural effusions; prominence of the pulmonary vasculature suggestive of edema. BLE venous U.S. impression with no evidence of DVT.  Per Cardiology recs patient given Lasix x1 IVP and gtt started admit to hospital medicine for further eval.    Past Medical History:    Diagnosis Date    Actinic keratosis     Benign prostatic hyperplasia     Chronic kidney disease     Stage 3     Fibromyositis     Gouty arthropathy     Hyperlipidemia     Hypertension     Osteoarthritis of knee        Past Surgical History:   Procedure Laterality Date    APPENDECTOMY      [email or web address]  02/21/2011    COLONOSCOPY      COLONOSCOPY N/A 08/01/2022    Procedure: COLONOSCOPY;  Surgeon: Edgar May MD;  Location: [facility];  Service: Endoscopy;  Laterality: N/A;    ESOPHAGOGASTRODUODENOSCOPY N/A 08/01/2022    Procedure: EGD (ESOPHAGOGASTRODUODENOSCOPY);  Surgeon: Edgar May MD;  Location: [facility];  Service: Endoscopy;  Laterality: N/A;    ESOPHAGOGASTRODUODENOSCOPY N/A 9/12/2022    Procedure: EGD (ESOPHAGOGASTRODUODENOSCOPY);  Surgeon: Edgar May MD;  Location: [facility];  Service: Endoscopy;  Laterality: N/A;    ESOPHAGOGASTRODUODENOSCOPY N/A 3/26/2025    Procedure: EGD (ESOPHAGOGASTRODUODENOSCOPY);  Surgeon: Roxana Stuart MD;  Location: [facility];  Service: Endoscopy;  Laterality: N/A;    UPPER GASTROINTESTINAL ENDOSCOPY         Review of patient's allergies indicates:   Allergen Reactions    Pcn [penicillins]        No current facility-administered medications on file prior to encounter.     Current Outpatient Medications on File Prior to Encounter   Medication Sig    allopurinoL (ZYLOPRIM) 300 MG tablet TAKE 1 TABLET BY MOUTH EVERY DAY (Patient taking differently: Take 300 mg by mouth nightly.)    antiox.mv no.10/omeg3s/lut/castro (I-CAPS ORAL) Take 1 tablet by mouth every evening.    apixaban (ELIQUIS) 2.5 mg Tab Take 1 tablet (2.5 mg total) by mouth 2 (two) times daily.    cholestyramine, with sugar, 4 gram Powd Take 1 Scoop by mouth once daily. (Patient taking differently: Take 1 Scoop by mouth every morning.)    docusate sodium (COLACE) 100 MG capsule Take 100 mg by mouth every morning.    furosemide (LASIX) 20 MG tablet Take 1 tablet (20 mg total) by mouth once daily.  (Patient taking differently: Take 40 mg by mouth once daily.)    latanoprost 0.005 % ophthalmic solution Place 1 drop into both eyes every evening.    pantoprazole (PROTONIX) 40 MG tablet Take 1 tablet (40 mg total) by mouth once daily.    polyethylene glycol (GLYCOLAX) 17 gram PwPk Take 17 g by mouth once daily. (Patient taking differently: Take 17 g by mouth daily as needed for Constipation.)    tamsulosin (FLOMAX) 0.4 mg Cap TAKE 1 CAPSULE BY MOUTH EVERY DAY (Patient taking differently: Take 1 capsule by mouth nightly.)    furosemide (LASIX) 20 MG tablet Take one of these addl Lasix 20mg tablets with your regularly scheduled Lasix tabs daily for 10 days, then return to Lasix 20mg daily.    [DISCONTINUED] travoprost, benzalkonium, (TRAVATAN) 0.004 % ophthalmic solution 1 drop every evening.     Family History       Problem Relation (Age of Onset)    Coronary artery disease Mother    Hypertension Mother          Tobacco Use    Smoking status: Former     Current packs/day: 0.00     Types: Cigarettes     Quit date:      Years since quittin.3    Smokeless tobacco: Former     Types: Chew   Substance and Sexual Activity    Alcohol use: Yes     Comment: occasionally    Drug use: Never    Sexual activity: Not Currently     Review of Systems   Constitutional:  Positive for fatigue. Negative for activity change, chills and fever.   HENT:  Negative for congestion and sore throat.    Respiratory:  Positive for shortness of breath.    Cardiovascular:  Positive for leg swelling. Negative for chest pain.   Gastrointestinal:  Negative for abdominal pain, constipation, diarrhea, nausea and vomiting.   Genitourinary:  Positive for decreased urine volume. Negative for flank pain and hematuria.   Neurological:  Positive for weakness.   Psychiatric/Behavioral:  Negative for behavioral problems.      Objective:     Vital Signs (Most Recent):  Temp: 98 °F (36.7 °C) (25 1242)  Pulse: 91 (25 1527)  Resp: 15 (25  "1527)  BP: 136/62 (05/12/25 1527)  SpO2: 97 % (05/12/25 1527) Vital Signs (24h Range):  Temp:  [98 °F (36.7 °C)] 98 °F (36.7 °C)  Pulse:  [] 91  Resp:  [15-28] 15  SpO2:  [97 %-100 %] 97 %  BP: (102-136)/(62-65) 136/62     Weight: 84.8 kg (187 lb)  Body mass index is 30.18 kg/m².     Physical Exam  Vitals reviewed.   Constitutional:       General: He is sleeping. He is not in acute distress.     Appearance: He is ill-appearing.   HENT:      Head: Normocephalic and atraumatic.      Nose: Nose normal.      Mouth/Throat:      Mouth: Mucous membranes are dry.   Eyes:      Conjunctiva/sclera: Conjunctivae normal.   Cardiovascular:      Rate and Rhythm: Normal rate. Rhythm irregular.   Pulmonary:      Effort: Pulmonary effort is normal. No respiratory distress.      Breath sounds: Rales present.   Abdominal:      General: Bowel sounds are normal.      Palpations: Abdomen is soft.      Tenderness: There is no abdominal tenderness.   Musculoskeletal:         General: No tenderness. Normal range of motion.      Cervical back: Normal range of motion.      Right lower leg: Edema present.      Left lower leg: Edema present.   Skin:     General: Skin is warm and dry.      Coloration: Skin is pale.   Neurological:      Mental Status: He is easily aroused. Mental status is at baseline.                Significant Labs: All pertinent labs within the past 24 hours have been reviewed.  CBC:   Recent Labs   Lab 05/12/25  1424   WBC 7.91   HGB 8.9*   HCT 32.2*        CMP:   Recent Labs   Lab 05/12/25  1424      K 4.2      CO2 23   GLU 90   BUN 64*   CREATININE 2.2*   CALCIUM 9.1   PROT 6.6   ALBUMIN 3.6   BILITOT 1.1*   ALKPHOS 91   AST 43*   ALT 45*   ANIONGAP 11     Cardiac Markers:   Recent Labs   Lab 05/12/25  1424   BNP 1,745*     Coagulation: No results for input(s): "PT", "INR", "APTT" in the last 48 hours.  Troponin: No results for input(s): "TROPONINI", "TROPONINIHS" in the last 48 hours.  TSH:   Recent " Labs   Lab 03/25/25  1632   TSH 0.749     Urine Studies:   Recent Labs   Lab 05/12/25  1424   APPEARANCEUA Hazy*   SPECGRAV 1.020   PROTEINUA Trace*   BILIRUBINUA Negative   UROBILINOGEN Negative   LEUKOCYTESUR 3+*   RBCUA 1   WBCUA 54*   BACTERIA Occasional       Significant Imaging: I have reviewed all pertinent imaging results/findings within the past 24 hours.  Assessment/Plan:     Assessment & Plan  Acute on chronic heart failure  Patient is identified as having Diastolic (HFpEF) heart failure that is Acute on Chronic. CHF is currently uncontrolled due to volume overload due to: Continued edema of extremities, Rales/crackles on pulmonary exam, and Pulmonary edema/pleural effusion on CXR. Latest ECHO performed and demonstrates- Results for orders placed during the hospital encounter of 03/25/25    Echo    Interpretation Summary    Left Ventricle: Left ventricle was not well visualized due to poor sonic window. The left ventricle is at the upper limits of normal in size. Increased wall thickness. There is concentric hypertrophy. Septal motion is consistent with bundle branch block. There is low normal systolic function with a visually estimated ejection fraction of 50 - 55%. Unable to assess diastolic function due to atrial fibrillation.    Right Ventricle: The right ventricle is normal in size. Systolic function is normal.    Aortic Valve: The aortic valve is a trileaflet valve. There is severe aortic valve sclerosis. Moderately calcified right cusp. Moderately restricted motion. There is mild stenosis. Aortic valve area by VTI is 1.2 cm². Aortic valve area by velocity is 1.1 cm². Aortic valve peak velocity is 2.4 m/s. Mean gradient is 13 mmHg. Aortic Valve peak gradient is 23 mmHg. The dimensionless index is 0.48. There is mild aortic regurgitation.    Mitral Valve: There is mild to moderate regurgitation.    Tricuspid Valve: There is moderate regurgitation. There is mild pulmonary hypertension. The estimated  PA systolic pressure is at least 44 mmHg.  . Continue Furosemide and monitor clinical status closely. Monitor on telemetry. Patient is on CHF pathway.  Monitor strict Is&Os and daily weights.  Place on fluid restriction of 1.5 L. Cardiology is consulted. Continue to stress to patient importance of self efficacy and  on diet for CHF. Last BNP reviewed- and noted below   Recent Labs   Lab 05/12/25  1424   BNP 1,745*   .         Stage 3 chronic kidney disease  Creatine stable for now. CMP reviewed- noted Estimated Creatinine Clearance: 19.1 mL/min (A) (based on SCr of 2.2 mg/dL (H)). according to latest data. Based on current GFR, CKD stage is stage 3 - GFR 30-59.  Monitor UOP and serial CMP and adjust therapy as needed. Renally dose meds. Avoid nephrotoxic medications and procedures.  Iron deficiency anemia, unspecified  Anemia is likely due to Iron deficiency and chronic disease due to Chronic Kidney Disease. Most recent hemoglobin and hematocrit are listed below.  Recent Labs     05/12/25  1424   HGB 8.9*   HCT 32.2*     Plan  - Monitor serial CBC: Daily  - Transfuse PRBC if patient becomes hemodynamically unstable, symptomatic or H/H drops below 7/21.  - Patient has received  PRBCs on in the past  - Patient's anemia is currently improving    Atrial fibrillation  Patient has persistent (7 days or more) atrial fibrillation. Patient is currently in atrial fibrillation. [id number] Score: 2. The patients heart rate in the last 24 hours is as follows:  Pulse  Min: 86  Max: 104     Antiarrhythmics       Anticoagulants  apixaban tablet 2.5 mg, 2 times daily, Oral    Plan  - Replete lytes with a goal of K>4, Mg >2  - Patient is anticoagulated, see medications listed above.  - Patient's afib is currently controlled  - Monitor coags      LBBB (left bundle branch block)  Noted on EKG 3/25/25 and noted in cardiac hx  EKG PRN  Tele monitoring    UTI (urinary tract infection)  Urine cx  Rocephin  Bladder scan for  PVR  I&O  Prolonged QT interval  Avoid qt prolonging meds  Tele monitoring  Repeat EKG in a.m.    Advanced care planning/counseling discussion  I spent 15 minutes of face to face discussion regarding advance directives and end of life planning which included patient and family. Patient/Family understands the seriousness of their condition and would like to make their end of life decisions known as follows- DNR, would like to explore hospice consult but still wishes to pursue treatment at present and in the interim      VTE Risk Mitigation (From admission, onward)           Ordered     apixaban tablet 2.5 mg  2 times daily         05/12/25 1734     Reason for No Pharmacological VTE Prophylaxis  Once        Question:  Reasons:  Answer:  Already adequately anticoagulated on oral Anticoagulants    05/12/25 1734     IP VTE HIGH RISK PATIENT  Once         05/12/25 1734     Place sequential compression device  Until discontinued         05/12/25 1734                         On 05/12/2025, patient should be placed in hospital observation services under my care in collaboration with Dr. Argueta.           Anahy Aguilera, NP  Department of Hospital Medicine  Replaced by [facility] - Emergency Dept

## 2025-05-12 NOTE — ASSESSMENT & PLAN NOTE
I spent 15 minutes of face to face discussion regarding advance directives and end of life planning which included patient and family. Patient/Family understands the seriousness of their condition and would like to make their end of life decisions known as follows- DNR, would like to explore hospice consult but still wishes to pursue treatment at present and in the interim

## 2025-05-12 NOTE — ED NOTES
Bed: 08  Expected date:   Expected time:   Means of arrival:   Comments:  sonya   [Dear  ___] : Dear  [unfilled], [Consult Letter:] : I had the pleasure of evaluating your patient, [unfilled]. [Please see my note below.] : Please see my note below. [Consult Closing:] : Thank you very much for allowing me to participate in the care of this patient.  If you have any questions, please do not hesitate to contact me. [Sincerely,] : Sincerely, [FreeTextEntry3] : Shubham Estrada MD\par

## 2025-05-13 LAB
ABSOLUTE EOSINOPHIL (SMH): 0.05 K/UL
ABSOLUTE MONOCYTE (SMH): 0.47 K/UL (ref 0.3–1)
ABSOLUTE NEUTROPHIL COUNT (SMH): 5.7 K/UL (ref 1.8–7.7)
ALBUMIN SERPL-MCNC: 3.1 G/DL (ref 3.5–5.2)
ALP SERPL-CCNC: 77 UNIT/L (ref 55–135)
ALT SERPL-CCNC: 34 UNIT/L (ref 10–44)
ANION GAP (SMH): 12 MMOL/L (ref 8–16)
AST SERPL-CCNC: 27 UNIT/L (ref 10–40)
BASOPHILS # BLD AUTO: 0.01 K/UL
BASOPHILS NFR BLD AUTO: 0.1 %
BILIRUB SERPL-MCNC: 0.7 MG/DL (ref 0.1–1)
BUN SERPL-MCNC: 61 MG/DL (ref 10–30)
CALCIUM SERPL-MCNC: 8.7 MG/DL (ref 8.7–10.5)
CHLORIDE SERPL-SCNC: 104 MMOL/L (ref 95–110)
CO2 SERPL-SCNC: 25 MMOL/L (ref 23–29)
CREAT SERPL-MCNC: 2.1 MG/DL (ref 0.5–1.4)
EAG (SMH): 126 MG/DL (ref 68–131)
ERYTHROCYTE [DISTWIDTH] IN BLOOD BY AUTOMATED COUNT: 21.2 % (ref 11.5–14.5)
GFR SERPLBLD CREATININE-BSD FMLA CKD-EPI: 28 ML/MIN/1.73/M2
GLUCOSE SERPL-MCNC: 85 MG/DL (ref 70–110)
HBA1C MFR BLD: 6 % (ref 4.5–6.2)
HCT VFR BLD AUTO: 27.8 % (ref 40–54)
HGB BLD-MCNC: 8 GM/DL (ref 14–18)
IMM GRANULOCYTES # BLD AUTO: 0.03 K/UL (ref 0–0.04)
IMM GRANULOCYTES NFR BLD AUTO: 0.4 % (ref 0–0.5)
INR PPP: 1.3 (ref 0.8–1.2)
LYMPHOCYTES # BLD AUTO: 0.54 K/UL (ref 1–4.8)
MCH RBC QN AUTO: 21.7 PG (ref 27–31)
MCHC RBC AUTO-ENTMCNC: 28.8 G/DL (ref 32–36)
MCV RBC AUTO: 76 FL (ref 82–98)
NUCLEATED RBC (/100WBC) (SMH): 1 /100 WBC
OHS QRS DURATION: 164 MS
OHS QTC CALCULATION: 452 MS
PLATELET # BLD AUTO: 228 K/UL (ref 150–450)
PMV BLD AUTO: 10.5 FL (ref 9.2–12.9)
POTASSIUM SERPL-SCNC: 3.9 MMOL/L (ref 3.5–5.1)
PROT SERPL-MCNC: 5.9 GM/DL (ref 6–8.4)
PROTHROMBIN TIME: 14 SECONDS (ref 9–12.5)
RBC # BLD AUTO: 3.68 M/UL (ref 4.6–6.2)
RELATIVE EOSINOPHIL (SMH): 0.7 % (ref 0–8)
RELATIVE LYMPHOCYTE (SMH): 7.9 % (ref 18–48)
RELATIVE MONOCYTE (SMH): 6.9 % (ref 4–15)
RELATIVE NEUTROPHIL (SMH): 84 % (ref 38–73)
SODIUM SERPL-SCNC: 141 MMOL/L (ref 136–145)
T4 FREE SERPL-MCNC: 1.23 NG/DL (ref 0.71–1.51)
TROPONIN HIGH SENSITIVE (SMH): 20.8 PG/ML
TROPONIN HIGH SENSITIVE (SMH): 21 PG/ML
TSH SERPL-ACNC: 1.34 UIU/ML (ref 0.34–5.6)
WBC # BLD AUTO: 6.84 K/UL (ref 3.9–12.7)

## 2025-05-13 PROCEDURE — 36415 COLL VENOUS BLD VENIPUNCTURE: CPT

## 2025-05-13 PROCEDURE — 99900031 HC PATIENT EDUCATION (STAT)

## 2025-05-13 PROCEDURE — 11000001 HC ACUTE MED/SURG PRIVATE ROOM

## 2025-05-13 PROCEDURE — 63600175 PHARM REV CODE 636 W HCPCS

## 2025-05-13 PROCEDURE — 83036 HEMOGLOBIN GLYCOSYLATED A1C: CPT

## 2025-05-13 PROCEDURE — 97535 SELF CARE MNGMENT TRAINING: CPT

## 2025-05-13 PROCEDURE — 85025 COMPLETE CBC W/AUTO DIFF WBC: CPT

## 2025-05-13 PROCEDURE — 25000003 PHARM REV CODE 250

## 2025-05-13 PROCEDURE — 97165 OT EVAL LOW COMPLEX 30 MIN: CPT

## 2025-05-13 PROCEDURE — 94799 UNLISTED PULMONARY SVC/PX: CPT

## 2025-05-13 PROCEDURE — 25000003 PHARM REV CODE 250: Performed by: EMERGENCY MEDICINE

## 2025-05-13 PROCEDURE — 94761 N-INVAS EAR/PLS OXIMETRY MLT: CPT

## 2025-05-13 PROCEDURE — 99900035 HC TECH TIME PER 15 MIN (STAT)

## 2025-05-13 PROCEDURE — 84443 ASSAY THYROID STIM HORMONE: CPT

## 2025-05-13 PROCEDURE — 85610 PROTHROMBIN TIME: CPT

## 2025-05-13 PROCEDURE — 82435 ASSAY OF BLOOD CHLORIDE: CPT

## 2025-05-13 PROCEDURE — 63600175 PHARM REV CODE 636 W HCPCS: Performed by: EMERGENCY MEDICINE

## 2025-05-13 PROCEDURE — 84484 ASSAY OF TROPONIN QUANT: CPT

## 2025-05-13 PROCEDURE — 84439 ASSAY OF FREE THYROXINE: CPT

## 2025-05-13 PROCEDURE — 99223 1ST HOSP IP/OBS HIGH 75: CPT | Mod: ,,, | Performed by: INTERNAL MEDICINE

## 2025-05-13 PROCEDURE — 12000002 HC ACUTE/MED SURGE SEMI-PRIVATE ROOM

## 2025-05-13 RX ADMIN — TAMSULOSIN HYDROCHLORIDE 0.4 MG: 0.4 CAPSULE ORAL at 09:05

## 2025-05-13 RX ADMIN — CHOLESTYRAMINE 4 GRAMS OF ANHYDROUS CHOLESTYRAMINE: 4 POWDER, FOR SUSPENSION ORAL at 09:05

## 2025-05-13 RX ADMIN — CEFTRIAXONE SODIUM 2 G: 1 INJECTION, POWDER, FOR SOLUTION INTRAMUSCULAR; INTRAVENOUS at 05:05

## 2025-05-13 RX ADMIN — FUROSEMIDE 10 MG/HR: 10 INJECTION, SOLUTION INTRAMUSCULAR; INTRAVENOUS at 10:05

## 2025-05-13 RX ADMIN — LIDOCAINE 1 PATCH: 50 PATCH TOPICAL at 05:05

## 2025-05-13 RX ADMIN — ALLOPURINOL 300 MG: 300 TABLET ORAL at 09:05

## 2025-05-13 RX ADMIN — APIXABAN 2.5 MG: 2.5 TABLET, FILM COATED ORAL at 09:05

## 2025-05-13 RX ADMIN — FUROSEMIDE 10 MG/HR: 10 INJECTION, SOLUTION INTRAMUSCULAR; INTRAVENOUS at 03:05

## 2025-05-13 RX ADMIN — LATANOPROST 1 DROP: 50 SOLUTION OPHTHALMIC at 09:05

## 2025-05-13 RX ADMIN — PANTOPRAZOLE SODIUM 40 MG: 40 TABLET, DELAYED RELEASE ORAL at 06:05

## 2025-05-13 RX ADMIN — DOCUSATE SODIUM 100 MG: 100 CAPSULE, LIQUID FILLED ORAL at 06:05

## 2025-05-13 NOTE — ASSESSMENT & PLAN NOTE
Creatine stable for now. CMP reviewed- noted Estimated Creatinine Clearance: 20.1 mL/min (A) (based on SCr of 2.1 mg/dL (H)). according to latest data. Based on current GFR, CKD stage is stage 3 - GFR 30-59.  Monitor UOP and serial CMP and adjust therapy as needed. Renally dose meds. Avoid nephrotoxic medications and procedures.    Family pursuing hospice care

## 2025-05-13 NOTE — ASSESSMENT & PLAN NOTE
I spent 25 minutes of face to face discussion regarding advance directives and end of life planning which included patient and family. Patient/Family understands the seriousness of their condition and would like to make their end of life decisions known as follows- DNR, would like to pursue hospice, under leak into different options, and wants to continue with current treatment without any escalation

## 2025-05-13 NOTE — PLAN OF CARE
MANINDER sent referral to [facility] via [facility] after the family's informational session and they accepted services with [facility].     MANINDER followed up with the patient's family regarding the discharge plan. Patient's family expressed a concern with bringing the patient home due to not being able to care for him and clean him. Patient's family expressed concerns about moving patient home. Patient's family has decided to accept inpatient hospice services.     MANINDER spoke with Dre at [facility] regarding an assessment for inpatient hospice due to the family not having a preference. Dre scheduled to meet with patient for assessment.      05/13/25 1315   Post-Acute Status   Post-Acute Authorization Hospice   Hospice Status Referrals Sent   Patient choice form signed by patient/caregiver List with quality metrics by geographic area provided   Discharge Delays None known at this time   Discharge Plan   Discharge Plan A Hospice/home   Discharge Plan B Hospice/home

## 2025-05-13 NOTE — PLAN OF CARE
Problem: Adult Inpatient Plan of Care  Goal: Plan of Care Review  Outcome: Progressing  Goal: Optimal Comfort and Wellbeing  Outcome: Progressing  Goal: Readiness for Transition of Care  Outcome: Progressing     Problem: Fall Injury Risk  Goal: Absence of Fall and Fall-Related Injury  Outcome: Progressing     Problem: Adult Inpatient Plan of Care  Goal: Plan of Care Review  Outcome: Progressing     Problem: Adult Inpatient Plan of Care  Goal: Optimal Comfort and Wellbeing  Outcome: Progressing     Problem: Adult Inpatient Plan of Care  Goal: Readiness for Transition of Care  Outcome: Progressing     Problem: Fall Injury Risk  Goal: Absence of Fall and Fall-Related Injury  Outcome: Progressing

## 2025-05-13 NOTE — PLAN OF CARE
[facility]  Initial Discharge Assessment       Primary Care Provider: Thalia Felton MD    Admission Diagnosis: Congestive heart failure, unspecified HF chronicity, unspecified heart failure type [I50.9]    Admission Date: 5/12/2025  Expected Discharge Date: 5/15/2025    SW met with patient bedside. Sharif Edmondson, grandchild, was present in the room assisting with assessment. SW verified demographics, insurance, supports, and PCP.  Patient's grandchild reported patient lives in the home with him and his family and family brings him to appointments. SW assessed patient's needs. Patient is able to complete ADLs independently. Patient's grandchild verified at home DME: rollator, cane.  Patient's grandchild verified HH ([facility]), No Dialysis, Blood Thinners (Eliquis), and No Oxygen.     Patient's grandchild verified [facility] of choice[facility] [place]  Patient's grandchild confirmed transportation at the time of discharge will need to be ambulance.    Transition of Care Barriers: None    Payor: MEDICARE / Plan: MEDICARE RAILROAD USP / Product Type: Government /     Extended Emergency Contact Information  Primary Emergency Contact: Sharif Edmondson  Address: [address] [place] [address] [facility]  Home Phone: [phone]  Mobile Phone: [phone]  Relation: Grandchild    Discharge Plan A: Hospice/home  Discharge Plan B: Hospice/home      [id number] [place] - [address] AT [facility]  [address]  [place] [address]  Phone: [phone] Fax: [phone]      Initial Assessment (most recent)       Adult Discharge Assessment - 05/13/25 1241          Discharge Assessment    Assessment Type Discharge Planning Assessment     Confirmed/corrected address, phone number and insurance Yes     Confirmed Demographics Correct on Facesheet     Source of Information family     If unable to respond/provide information was  family/caregiver contacted? Yes     Contact Name/Number gee Almanza, [phone]     When was your last doctors appointment? 04/03/25     Does patient/caregiver understand observation status Yes     Communicated NADEEN with patient/caregiver Date not available/Unable to determine     Reason For Admission Acute on chronic heart failure     People in Home grandchild(leonardo)     Do you expect to return to your current living situation? Yes     Do you have help at home or someone to help you manage your care at home? Yes     Who are your caregiver(s) and their phone number(s)? gee Almanza, [phone]     Prior to hospitilization cognitive status: Unable to Assess     Current cognitive status: Unable to Assess     Walking or Climbing Stairs Difficulty yes     Walking or Climbing Stairs ambulation difficulty, requires equipment     Mobility Management rollator, cane     Dressing/Bathing Difficulty no     Equipment Currently Used at Home rollator;cane, straight     Readmission within 30 days? No     Patient currently being followed by outpatient case management? No     Do you currently have service(s) that help you manage your care at home? Yes     Name and Contact number of agency [facility]     Is the pt/caregiver preference to resume services with current agency No     Do you take prescription medications? Yes     Do you have prescription coverage? Yes     Do you have any problems affording any of your prescribed medications? No     Is the patient taking medications as prescribed? yes     Who is going to help you get home at discharge? ambulance     How do you get to doctors appointments? family or friend will provide     Are you on dialysis? No     Do you take coumadin? No   Eliquis    Discharge Plan A Hospice/home     Discharge Plan B Home Health     DME Needed Upon Discharge  none     Discharge Plan discussed with: --   Grandchild    Transition of Care Barriers None

## 2025-05-13 NOTE — ASSESSMENT & PLAN NOTE
Patient is identified as having Diastolic (HFpEF) heart failure that is Acute on Chronic. CHF is currently uncontrolled due to volume overload due to: Continued edema of extremities, Rales/crackles on pulmonary exam, and Pulmonary edema/pleural effusion on CXR. Latest ECHO performed and demonstrates- Results for orders placed during the hospital encounter of 03/25/25    Echo    Interpretation Summary    Left Ventricle: Left ventricle was not well visualized due to poor sonic window. The left ventricle is at the upper limits of normal in size. Increased wall thickness. There is concentric hypertrophy. Septal motion is consistent with bundle branch block. There is low normal systolic function with a visually estimated ejection fraction of 50 - 55%. Unable to assess diastolic function due to atrial fibrillation.    Right Ventricle: The right ventricle is normal in size. Systolic function is normal.    Aortic Valve: The aortic valve is a trileaflet valve. There is severe aortic valve sclerosis. Moderately calcified right cusp. Moderately restricted motion. There is mild stenosis. Aortic valve area by VTI is 1.2 cm². Aortic valve area by velocity is 1.1 cm². Aortic valve peak velocity is 2.4 m/s. Mean gradient is 13 mmHg. Aortic Valve peak gradient is 23 mmHg. The dimensionless index is 0.48. There is mild aortic regurgitation.    Mitral Valve: There is mild to moderate regurgitation.    Tricuspid Valve: There is moderate regurgitation. There is mild pulmonary hypertension. The estimated PA systolic pressure is at least 44 mmHg.      . Continue Furosemide drip and monitor clinical status closely. Monitor on telemetry. Patient is on CHF pathway.  Monitor strict Is&Os and daily weights.  Place on fluid restriction of 1.5 L. Cardiology is consulted. Continue to stress to patient importance of self efficacy and  on diet for CHF. Last BNP reviewed- and noted below   Recent Labs   Lab 05/12/25  1424   BNP 1,745*    .  Not a dialysis candidate  Family pursuing hospice care

## 2025-05-13 NOTE — PT/OT/SLP PROGRESS
Speech Language Pathology      Vel Barclay Oseas  MRN: [id number]    Orders received for swallow evaluation. Patient not seen today secondary to Patient unwilling to participate. Pt, grandson and RN deny difficulty swallowing. Plans to DC home with hospice. Reconsult if GOC should change.

## 2025-05-13 NOTE — CONSULTS
[facility]  Department of Cardiology  Consult Note      PATIENT NAME: Vel Banda    MRN: [id number]  TODAY'S DATE: 05/13/2025  ADMIT DATE: 5/12/2025                          CONSULT REQUESTED BY: Bibi Castaneda, *    SUBJECTIVE     PRINCIPAL PROBLEM: Acute on chronic heart failure    HPI:    Patient is a 89-year-old male who presented to the emergency room per recommendation of Dr. Jules due to fluid volume overload.  He has a known history of heart failure with preserved ejection fraction, moderate aortic stenosis, chronic AFib, CKD, and left bundle branch block.  Patient also has history of anemia, BPH, and gout.  Patient seen resting in bed with no acute distress noted.  Significant lower extremity edema noted.  Patient has a PureWick in place which does have a leak.  Noted to have a wet brief.      REASON FOR CONSULT:  From Hospitalist H&P:   HPI: 89-year-old male presented to ED from Cardiology office for eval of fluid overload.  pMHx AF, HF, CKD, aortic valve stenosis, HLD, LBBB, anemia, gout, BPH.  Patient evaluated by Cardiology outpatient earlier today for shortness of breath and lower extremity edema that has been progressively worsening since March of this year, patient subsequently advised to go to ED for diuresis and Cardiology follow-up.  Patient has history of HFpEF, echo 3/25/2025 with EF 50-55%, on Lasix outpatient, patient's grandson at bedside reports compliance with Lasix, states patient is still not diuresing well even if given extra dose of p.o. Lasix.  Patient also with history of AF, on Eliquis outpatient.  It is reported patient has been reluctant to continue attending outpatient doctor visits, discussed hospice consult on exam, patient family are agreeable to hospice consult but would like to continue with medical treatment at present and in the interim.  In ED today, BNP 1745.  Initial troponin 72.  UA with UTI. CXR impression with hypoinflation with cardiomegaly  and small pleural effusions; prominence of the pulmonary vasculature suggestive of edema. BLE venous U.[place]. impression with no evidence of DVT.  Per Cardiology recs patient given Lasix x1 IVP and gtt started admit to hospital medicine for further eval.       Review of patient's allergies indicates:   Allergen Reactions    Pcn [penicillins]        Past Medical History:   Diagnosis Date    Actinic keratosis     Benign prostatic hyperplasia     Chronic kidney disease     Stage 3     Fibromyositis     Gouty arthropathy     Hyperlipidemia     Hypertension     Osteoarthritis of knee      Past Surgical History:   Procedure Laterality Date    APPENDECTOMY      cataractsx2  02/21/2011    COLONOSCOPY      COLONOSCOPY N/A 08/01/2022    Procedure: COLONOSCOPY;  Surgeon: Edgar May MD;  Location: [facility];  Service: Endoscopy;  Laterality: N/A;    ESOPHAGOGASTRODUODENOSCOPY N/A 08/01/2022    Procedure: EGD (ESOPHAGOGASTRODUODENOSCOPY);  Surgeon: Edgar May MD;  Location: [facility];  Service: Endoscopy;  Laterality: N/A;    ESOPHAGOGASTRODUODENOSCOPY N/A 9/12/2022    Procedure: EGD (ESOPHAGOGASTRODUODENOSCOPY);  Surgeon: Edgar May MD;  Location: [facility];  Service: Endoscopy;  Laterality: N/A;    ESOPHAGOGASTRODUODENOSCOPY N/A 3/26/2025    Procedure: EGD (ESOPHAGOGASTRODUODENOSCOPY);  Surgeon: Roxana Stuart MD;  Location: [facility];  Service: Endoscopy;  Laterality: N/A;    UPPER GASTROINTESTINAL ENDOSCOPY       Social History[1]     REVIEW OF SYSTEMS  Per HPI    OBJECTIVE     VITAL SIGNS (Most Recent)  Temp: 97 °F (36.1 °C) (05/13/25 0809)  Pulse: 95 (05/13/25 0809)  Resp: 20 (05/13/25 0723)  BP: 109/64 (05/13/25 0809)  SpO2: 96 % (05/13/25 0809)    VENTILATION STATUS  Resp: 20 (05/13/25 0723)  SpO2: 96 % (05/13/25 0809)  Oxygen Concentration (%):  [21] 21        I & O (Last 24H):  Intake/Output Summary (Last 24 hours) at 5/13/2025 0853  Last data filed at 5/13/2025 0636  Gross per 24 hour   Intake  320 ml   Output 600 ml   Net -280 ml       WEIGHTS  Wt Readings from Last 1 Encounters:   05/12/25 1242 84.8 kg (187 lb)       PHYSICAL EXAM  CONSTITUTIONAL: NAD  HEENT: Normocephalic,          NECK:  No JVD   CVS: S1S2+, RRR  LUNGS: basilar crackles   ABDOMEN: Soft, NT, BS+  EXTREMITIES: 1-2+ LE edema  : No dennis catheter  NEURO: AAO X 3  PSY: Normal affect      HOME MEDICATIONS:Medications Ordered Prior to Encounter[2]    SCHEDULED MEDS:   allopurinoL  300 mg Oral Nightly    apixaban  2.5 mg Oral BID    cefTRIAXone (Rocephin) IV (PEDS and ADULTS)  2 g Intravenous Q24H    cholestyramine-aspartame  1 packet Oral BID    docusate sodium  100 mg Oral QAM    latanoprost  1 drop Both Eyes QHS    LIDOcaine  1 patch Transdermal Q24H    pantoprazole  40 mg Oral Daily    tamsulosin  1 capsule Oral Nightly       CONTINUOUS INFUSIONS:   furosemide (LASIX) 2 mg/mL continuous infusion (titrating)  10 mg/hr Intravenous Continuous 5 mL/hr at 05/13/25 0338 10 mg/hr at 05/13/25 0338       PRN MEDS:  Current Facility-Administered Medications:     acetaminophen, 650 mg, Oral, Q6H PRN    bisacodyL, 10 mg, Rectal, Daily PRN    melatonin, 6 mg, Oral, Nightly PRN    promethazine, 12.5 mg, Oral, Q6H PRN    sodium chloride 0.9%, 10 mL, Intravenous, PRN    LABS AND DIAGNOSTICS     CBC LAST 3 DAYS  Recent Labs   Lab 05/12/25  1424   WBC 7.91   RBC 4.14*   HGB 8.9*   HCT 32.2*   MCV 78*   MCH 21.5*   MCHC 27.6*   RDW 21.5*      MPV 10.1   NRBC 1*       COAGULATION LAST 3 DAYS  Recent Labs   Lab 05/12/25  1859 05/13/25  0719   INR  --  1.3*   APTT 27.7  --        CHEMISTRY LAST 3 DAYS  Recent Labs   Lab 05/12/25  1424 05/13/25  0719    141   K 4.2 3.9    104   CO2 23 25   ANIONGAP 11 12   BUN 64* 61*   CREATININE 2.2* 2.1*   GLU 90 85   CALCIUM 9.1 8.7   MG 2.2  --    ALBUMIN 3.6 3.1*   PROT 6.6 5.9*   ALKPHOS 91 77   ALT 45* 34   AST 43* 27   BILITOT 1.1* 0.7       CARDIAC PROFILE LAST 3 DAYS  Recent Labs   Lab  "05/12/25 1424   BNP 1,745*       ENDOCRINE LAST 3 DAYS  Recent Labs   Lab 05/13/25  0719   TSH 1.345       LAST ARTERIAL BLOOD GAS  ABG  No results for input(s): "PH", "PO2", "PCO2", "HCO3", "BE" in the last 168 hours.    LAST 7 DAYS MICROBIOLOGY   Microbiology Results (last 7 days)       Procedure Component Value Units Date/Time    Urine culture [[id number]]  (Abnormal) Collected: 05/12/25 1424    Order Status: Completed Specimen: Urine Updated: 05/13/25 0703     Urine Culture >100,000 cfu/ml Gram-negative reji -  species            MOST RECENT IMAGING  X-Ray Chest AP Portable  Narrative: EXAMINATION:  XR CHEST AP PORTABLE    CLINICAL HISTORY:  CHF;    FINDINGS:  Portable chest at 14:26 hours is compared to 04/21/2025 shows cardiomegaly.  There is hypoinflation.    There are small bilateral pleural effusions with bibasilar airspace disease.  There is prominence of the central pulmonary vasculature suggestive of edema.  No acute osseous abnormality.  Impression: Hypoinflation with cardiomegaly and small pleural effusions    Prominence of the pulmonary vasculature suggestive of edema.    Electronically signed by: Marlen Cunningham  Date:    05/12/2025  Time:    14:41  US Lower Extremity Veins Bilateral  Narrative: EXAMINATION:  US LOWER EXTREMITY VEINS BILATERAL    CLINICAL HISTORY:  Other specified soft tissue disorders    COMPARISON:  None    FINDINGS:  Grayscale, color Doppler, and spectral Doppler analysis was performed.    Bilateral common femoral, femoral, popliteal, and proximal great saphenous veins show normal compressibility, augmentation, and color Doppler flow. Bilateral posterior tibial, anterior tibial, and peroneal veins are unremarkable.  Impression: No evidence of bilateral lower extremity DVT    Electronically signed by: Marlen Cunningham  Date:    05/12/2025  Time:    13:59      ECHOCARDIOGRAM RESULTS (last 5)  Results for orders placed during the hospital encounter of " 03/25/25    Echo    Interpretation Summary    Left Ventricle: Left ventricle was not well visualized due to poor sonic window. The left ventricle is at the upper limits of normal in size. Increased wall thickness. There is concentric hypertrophy. Septal motion is consistent with bundle branch block. There is low normal systolic function with a visually estimated ejection fraction of 50 - 55%. Unable to assess diastolic function due to atrial fibrillation.    Right Ventricle: The right ventricle is normal in size. Systolic function is normal.    Aortic Valve: The aortic valve is a trileaflet valve. There is severe aortic valve sclerosis. Moderately calcified right cusp. Moderately restricted motion. There is mild stenosis. Aortic valve area by VTI is 1.2 cm². Aortic valve area by velocity is 1.1 cm². Aortic valve peak velocity is 2.4 m/s. Mean gradient is 13 mmHg. Aortic Valve peak gradient is 23 mmHg. The dimensionless index is 0.48. There is mild aortic regurgitation.    Mitral Valve: There is mild to moderate regurgitation.    Tricuspid Valve: There is moderate regurgitation. There is mild pulmonary hypertension. The estimated PA systolic pressure is at least 44 mmHg.      CURRENT/PREVIOUS VISIT EKG  Results for orders placed or performed during the hospital encounter of 03/25/25   EKG 12-lead    Collection Time: 03/25/25  4:04 PM   Result Value Ref Range    QRS Duration 160 ms    [facility] QTC Calculation 492 ms    Narrative    Test Reason : R06.02,    Vent. Rate :  91 BPM     Atrial Rate :    BPM     P-R Int :    ms          QRS Dur : 160 ms      QT Int : 400 ms       P-R-T Axes :    -39 115 degrees    QTcB Int : 492 ms    Atrial fibrillation  Left axis deviation  Left bundle branch block  Abnormal ECG  No previous ECGs available  Confirmed by Abraham Tomas ([id number]) on 3/26/2025 1:03:09 PM    Referred By: AAAREFERRAL SELF           Confirmed By: Abraham Tomas           ASSESSMENT/PLAN:     Active Hospital Problems     Diagnosis    *Acute on chronic heart failure    Atrial fibrillation    LBBB (left bundle branch block)    UTI (urinary tract infection)    Prolonged QT interval    Advanced care planning/counseling discussion    Iron deficiency anemia, unspecified    Stage 3 chronic kidney disease       ASSESSMENT & PLAN:     Acute on chronic HFpEF  Afib   Moderate AS  UTI  CKD  LBBB      RECOMMENDATIONS:    Continue furosemide drip at 10mg/hr. Strict I&O. Trend labs.   BP soft but stable. He is not on antihypertensives at home.   Will try to diurese and optimize and plan for dc home with oral diuretics as previously prescribed.   Out of bed to chair in up with staff as able.  Consider palliative care.  He is a DNR.      Adela Liao NP  Date of Service: 2025  8:53 AM      I have personally interviewed and examined the patient, I have reviewed the Nurse Practitioner's history and physical, assessment, and plan. I have personally evaluated the patient at bedside and agree with the findings and made appropriate changes as necessary in recommendations.  All pertinent recent labs, imaging and EKGs independently reviewed and interpreted.     Brittaney Escalante MD [facility]  Department of Cardiology  [facility]  2025         [1]   Social History  Tobacco Use    Smoking status: Former     Current packs/day: 0.00     Types: Cigarettes     Quit date:      Years since quittin.3    Smokeless tobacco: Former     Types: Chew   Substance Use Topics    Alcohol use: Yes     Comment: occasionally    Drug use: Never   [2]   No current facility-administered medications on file prior to encounter.     Current Outpatient Medications on File Prior to Encounter   Medication Sig Dispense Refill    allopurinoL (ZYLOPRIM) 300 MG tablet TAKE 1 TABLET BY MOUTH EVERY DAY (Patient taking differently: Take 300 mg by mouth nightly.) 90 tablet 0    antiox.mv no.10/omeg3s/lut/castro (I-CAPS ORAL) Take 1 tablet by mouth every evening.       apixaban (ELIQUIS) 2.5 mg Tab Take 1 tablet (2.5 mg total) by mouth 2 (two) times daily. 180 tablet 1    cholestyramine, with sugar, 4 gram Powd Take 1 Scoop by mouth once daily. (Patient taking differently: Take 1 Scoop by mouth every morning.) 3 each 3    docusate sodium (COLACE) 100 MG capsule Take 100 mg by mouth every morning.      furosemide (LASIX) 20 MG tablet Take 1 tablet (20 mg total) by mouth once daily. (Patient taking differently: Take 40 mg by mouth once daily.) 90 tablet 1    latanoprost 0.005 % ophthalmic solution Place 1 drop into both eyes every evening.      pantoprazole (PROTONIX) 40 MG tablet Take 1 tablet (40 mg total) by mouth once daily. 90 tablet 3    polyethylene glycol (GLYCOLAX) 17 gram PwPk Take 17 g by mouth once daily. (Patient taking differently: Take 17 g by mouth daily as needed for Constipation.) 100 packet 3    tamsulosin (FLOMAX) 0.4 mg Cap TAKE 1 CAPSULE BY MOUTH EVERY DAY (Patient taking differently: Take 1 capsule by mouth nightly.) 90 capsule 0    furosemide (LASIX) 20 MG tablet Take one of these addl Lasix 20mg tablets with your regularly scheduled Lasix tabs daily for 10 days, then return to Lasix 20mg daily. 10 tablet 0

## 2025-05-13 NOTE — PLAN OF CARE
Met with Sharif Edmondson, grandchild, at bedside and he had his mother, Izzy, on the phone to review discharge plan of hospice/home or inpatient hospice and they are agreeable to plan.    Patient/family provided with a list of facilities in-network with patient's payor plan. Providers that are owned, operated, or affiliated with [facility] are included on the list.     Notified that referrals will be sent to the below listed facilities from in-network list based on proximity to home/family support:   [facility] (at home)  [facility] (inpatient)    Patient/family instructed to identify preference.    Preferred Facility: (if more than 1, listed in order of descending preference)  No preference     If an additional preferred facility not listed above is identified, additional referral to be sent. If above facilities unable to accept, will send additional referrals to in-network providers.      05/13/25 1439   Post-Acute Status   Post-Acute Authorization Hospice   Hospice Status Referrals Sent   Discharge Plan   Discharge Plan A Inpatient Hospice   Discharge Plan B Hospice/home

## 2025-05-13 NOTE — CONSULTS
[facility]  Adult Nutrition  Education Short Note      Nutrition Education    Previous education: yes    Diet at home: regular    Written information provided and explained on  cardiac diet diet.     Comments:     Discussed with: Gave info to family, pt not appropriate right now    Educational Need? Up to family to decide if pt need diet due to considering Hospice    Barriers: none identified    Interventions: Sodium modified diet    Patient and/or family comprehend instructions: yes    Outcome: Verbalizes understanding     Thanks for the consult!    Aminah Marie RD 05/13/2025 12:36 PM

## 2025-05-13 NOTE — PLAN OF CARE
Met with Sharif at bedside to review discharge recommendation of hospice and they are agreeable to plan.    Patient/family provided with a list of facilities in-network with patient's payor plan. Providers that are owned, operated, or affiliated with [facility] are included on the list.     Notified that referrals will be sent to the below listed facilities from in-network list based on proximity to home/family support:   Patient family would like informational sessions with   [facility]    Patient/family instructed to identify preference.    Preferred Facility: (if more than 1, listed in order of descending preference)  Pending conferences    If an additional preferred facility not listed above is identified, additional referral to be sent. If above facilities unable to accept, will send additional referrals to in-network providers.

## 2025-05-13 NOTE — HOSPITAL COURSE
89-year-old male admitted for possible CHF exacerbation.  He was started on Lasix drip for diuresis.  Also found to have UTI and was started on antibiotics.  Urine culture closely followed and growing Gram-negative reji.  Cardiology and Nephrology were consulted.  Patient's healthcare proxy, patient's grandson at bedside mentioned they would want to consider hospice care, case management and  notified, spacer referrals sent.  Palliative care consulted, appreciate recommendations.  Eventually patient and the family decided go home with hospice.   Discontinue IV Lasix drip, patient has excellent urine output , we will start on p.o. Lasix 80 mg once a day upon discharge due to significant CKD.     Stable discharged home with hospice

## 2025-05-13 NOTE — PT/OT/SLP EVAL
Occupational Therapy   Evaluation and Discharge Note    Name: Vel Banda  MRN: [id number]  Admitting Diagnosis: Acute on chronic heart failure  Recent Surgery: * No surgery found *      Recommendations:     Discharge Recommendations: No Therapy Indicated (dc pt going hospice)  Discharge Equipment Recommendations: none  Barriers to discharge:   none    Assessment:     Vel Banda is a 89 y.o. male with a medical diagnosis of Acute on chronic heart failure. At this time, patient is functioning at their prior level of function and does not require further acute OT services.     Plan:     During this hospitalization, patient does not require further acute OT services due to pt going home with hospice.  Please re-consult if situation changes.    Plan of Care Reviewed with: patient    Subjective     Chief Complaint: fatigue  Patient/Family Comments/goals: get some sleep    Occupational Profile:  Living Environment: pt lives with his grandson ans his grandson's wife in a [facility] with a tub/shower combo.   Previous level of function: per pt reported he is independent with all ADLs  Roles and Routines: sendentary and grandpa  Equipment Used at home: rollator, shower chair, cane, quad  Assistance upon Discharge: family, pt going hospice     Pain/Comfort:   none    Patients cultural, spiritual, Uatsdin conflicts given the current situation: no    Objective:     Communicated with: nurse prior to session.  Patient found supine with telemetry, peripheral IV, PureWick upon OT entry to room.    General Precautions: Standard, fall  Orthopedic Precautions: N/A  Braces: N/A  Respiratory Status: Room air     Occupational Performance:    Bed Mobility:  verbal encouragement to participate in OOB tasks  Patient completed Rolling/Turning to Right with minimum assistance  Patient completed Scooting/Bridging with maximal assistance and 2 persons  Patient completed Supine to Sit with minimum assistance  Patient completed Sit to Supine  with minimum assistance    Functional Mobility/Transfers: upon sitting up in bed pt noted after incontinence episode, pt performed STS to allow for change pads and doff brief  Patient completed Sit <> Stand Transfer with minimum assistance  with  rolling walker   Functional Mobility: pt was able to stay sitting EOB for 10 min  and standing for ~1 min     Activities of Daily Living:  Grooming: stand by assistance face hygiene  Lower Body Dressing: maximal assistance to doff brief while standing  Toileting: maximal assistance after incontinence episode    Cognitive/Visual Perceptual:  Cognitive/Psychosocial Skills:     -       Oriented to: Person, Place, Time, and Situation   -       Follows Commands/attention:Follows multistep  commands  -       Communication: clear/fluent  -       Safety awareness/insight to disability: intact   -       Mood/Affect/Coping skills/emotional control: Appropriate to situation and Cooperative    Physical Exam:  Balance:    -       good unsupported sitting balance and good standing balance with RW  Upper Extremity Range of Motion:     -       Right Upper Extremity: WFL  -       Left Upper Extremity: WFL  Upper Extremity Strength:    -       Right Upper Extremity: 3-/5  -       Left Upper Extremity: 3-/5   Strength: -       Right Upper Extremity: WFL  -       Left Upper Extremity: WFL  Gross motor coordination:   WFL    AMPAC 6 Click ADL:  AMPAC Total Score: 15    Treatment & Education:  Pt educated on role of OT/POC, importance of OOB/EOB activity, use of call bell, and safety during ADLs, transfers, and functional mobility.     Patient left supine with all lines intact, call button in reach, bed alarm on, and nursing care present    GOALS:   Multidisciplinary Problems       Occupational Therapy Goals          Problem: Occupational Therapy    Goal Priority Disciplines Outcome Interventions   Occupational Therapy Goal     OT, PT/OT     Description: Goals to be met by: 6/13/2025      Patient will increase functional independence with ADLs by performing:    UE Dressing with Supervision.  LE Dressing with Contact Guard Assistance and Minimal Assistance.  Grooming while seated at sink with Supervision.  Toileting from bedside commode with Supervision for hygiene and clothing management.                          DME Justifications:  No DME recommended requiring DME justifications    History:     Past Medical History:   Diagnosis Date    Actinic keratosis     Benign prostatic hyperplasia     Chronic kidney disease     Stage 3     Fibromyositis     Gouty arthropathy     Hyperlipidemia     Hypertension     Osteoarthritis of knee          Past Surgical History:   Procedure Laterality Date    APPENDECTOMY      cataractsx2  02/21/2011    COLONOSCOPY      COLONOSCOPY N/A 08/01/2022    Procedure: COLONOSCOPY;  Surgeon: Edgar May MD;  Location: [facility];  Service: Endoscopy;  Laterality: N/A;    ESOPHAGOGASTRODUODENOSCOPY N/A 08/01/2022    Procedure: EGD (ESOPHAGOGASTRODUODENOSCOPY);  Surgeon: Edgar May MD;  Location: [facility];  Service: Endoscopy;  Laterality: N/A;    ESOPHAGOGASTRODUODENOSCOPY N/A 9/12/2022    Procedure: EGD (ESOPHAGOGASTRODUODENOSCOPY);  Surgeon: Edgar May MD;  Location: [facility];  Service: Endoscopy;  Laterality: N/A;    ESOPHAGOGASTRODUODENOSCOPY N/A 3/26/2025    Procedure: EGD (ESOPHAGOGASTRODUODENOSCOPY);  Surgeon: Roxana Stuart MD;  Location: [facility];  Service: Endoscopy;  Laterality: N/A;    UPPER GASTROINTESTINAL ENDOSCOPY         Time Tracking:     OT Date of Treatment: 05/13/25  OT Start Time: 0923  OT Stop Time: 0953  OT Total Time (min): 30 min    Billable Minutes:Evaluation 7  Self Care/Home Management 23 5/13/2025

## 2025-05-13 NOTE — CONSULTS
Nephrology Consult Note        Patient Name: Vel Banda  MRN: [id number]    Patient Class: OP- Observation   Admission Date: 5/12/2025  Length of Stay: 0 days  Date of Service: 5/13/2025    Attending Physician: Bibi Castaneda, *  Primary Care Provider: Thalia Felton MD    Reason for Consult: chf exacerbation, ckd stage 4    SUBJECTIVE:     HPI: 89M with pMHx AF, CHF, CKD, AVS, HLD, LBBB, anemia, gout, BPH presented to ED from Cardiology office for eval of fluid overload. Patient evaluated by outpatient Cardiology for progressive shortness of breath and lower extremity edema since March of this year, and advised to go to ED for diuresis and Cardiology follow-up. Patient has history of HFpEF, echo 3/25/2025 with EF 50-55%, on Lasix outpatient. Patient's grandson at bedside reports compliance with Lasix, states patient is still not diuresing well even if given extra dose of p.o. Lasix.  Patient also with history of AF, on Eliquis outpatient.     It is reported patient has been reluctant to continue attending outpatient doctor visits, hospice consult is discussed, patient family are agreeable but would like to continue with medical treatment at present and in the interim.      In ED today, BNP 1,745. Initial troponin 72. UA with ? UTI. CXR with hypoinflation with cardiomegaly and small pleural effusions; prominence of the pulmonary vasculature suggestive of edema. BLE venous U.S. with no evidence of DVT.  Per Cardiology recs patient given Lasix x1 IVP and gtt started.     Review of Systems:  Constitutional:  Negative for chills, fever, POSITIVE for malaise/fatigue and weight gain.   HENT:  Negative for hearing loss and nosebleeds.    Eyes:  Negative for blurred vision, double vision and photophobia.   Respiratory:  Negative for cough and wheezing, POSITIVE for shortness of breath.    Cardiovascular:  Negative for chest pain, palpitations and POSITIVE for leg swelling.   Gastrointestinal:  Negative for  "abdominal pain, constipation, diarrhea, heartburn, nausea and vomiting.   Genitourinary:  Negative for dysuria, frequency and urgency.   Musculoskeletal:  Negative for falls, joint pain and myalgias.   Skin:  Negative for itching and rash.   Neurological:  Negative for dizziness, speech change, focal weakness, loss of consciousness and headaches.   Endo/Heme/Allergies:  Does not bruise/bleed easily.   Psychiatric/Behavioral:  Negative for depression and substance abuse. The patient is not nervous/anxious.      ASSESSMENT/PLAN:     UTI 2/2 GNR  HFpEF exacerbation  BPH  Gout  CKD stage 4, eGFR < 30 ml/min  No NSAIDs, ACEI/ARB, IV contrast or other nephrotoxins.  Keep MAP > 60, SBP > 100.  Dose meds for GFR < 30 ml/min.  Renal diet - low K, low phos.  Agree with lasix gtt. Cards eval appreciated.  Treat UTI per Cx.  Keep Gout and BPH meds.  Would support hospice plan if inline with patients goals of care.    Anemia of CKD  Hgb and HCT are acceptable. Monitor for now.    MBD / Secondary HPT  Ca, Phos, PTH and vitamin D levels are acceptable.   Phos binders, vitamin D and analogues, calcimimetics will be given as needed.    HTN  Tolerate asymptomatic HTN up to -160.  Continue home meds.  Low sodium diet as tolerated.    Thank you for allowing us to participate in the care of your patient!   We will follow the patient and provide recommendations as needed.         Laboratory:  Recent Labs   Lab 05/12/25  1424 05/13/25  0719    141   K 4.2 3.9    104   CO2 23 25   BUN 64* 61*   CREATININE 2.2* 2.1*   GLU 90 85       Recent Labs   Lab 05/12/25  1424 05/13/25  0719   CALCIUM 9.1 8.7   ALBUMIN 3.6 3.1*   MG 2.2  --              No results for input(s): "POCTGLUCOSE" in the last 168 hours.    Recent Labs   Lab 03/10/23  0907 09/13/23  0843   Hemoglobin A1C 4.5 4.6       Recent Labs   Lab 05/12/25  1424   WBC 7.91   HGB 8.9*   HCT 32.2*      MCV 78*   MCHC 27.6*       Recent Labs   Lab 05/12/25  1424 " 05/13/25  0719   BILITOT 1.1* 0.7   PROT 6.6 5.9*   ALBUMIN 3.6 3.1*   ALKPHOS 91 77   ALT 45* 34   AST 43* 27       Recent Labs   Lab 08/10/23  1134 05/12/25  1424   Color, POC UA Yellow  --    Appearance, UA  --  Hazy A   pH, POC UA 5.5  --    Clarity, POC UA Clear  --    Spec Grav POC UA 1.025  --    Spec Grav UA  --  1.020   Protein, UA  --  Trace A   Ketones, POC UA Negative  --    Urobilinogen, POC UA 0.2  --    Urobilinogen, UA  --  Negative   Bilirubin, UA  --  Negative   Nitrite, POC UA Negative  --    RBC, UA  --  1   WBC, UA  --  54 H   Bacteria, UA  --  Occasional             Microbiology Results (last 7 days)       Procedure Component Value Units Date/Time    Urine culture [[id number]]  (Abnormal) Collected: 05/12/25 1424    Order Status: Completed Specimen: Urine Updated: 05/13/25 0703     Urine Culture >100,000 cfu/ml Gram-negative reji -  species            Review of patient's allergies indicates:   Allergen Reactions    Pcn [penicillins]        Outpatient meds:  Medications Ordered Prior to Encounter[1]    Scheduled meds:   allopurinoL  300 mg Oral Nightly    apixaban  2.5 mg Oral BID    cefTRIAXone (Rocephin) IV (PEDS and ADULTS)  2 g Intravenous Q24H    cholestyramine-aspartame  1 packet Oral BID    docusate sodium  100 mg Oral QAM    latanoprost  1 drop Both Eyes QHS    LIDOcaine  1 patch Transdermal Q24H    pantoprazole  40 mg Oral Daily    tamsulosin  1 capsule Oral Nightly       Infusions:   furosemide (LASIX) 2 mg/mL continuous infusion (titrating)  10 mg/hr Intravenous Continuous 5 mL/hr at 05/13/25 0338 10 mg/hr at 05/13/25 0338       PRN meds:    Current Facility-Administered Medications:     acetaminophen, 650 mg, Oral, Q6H PRN    bisacodyL, 10 mg, Rectal, Daily PRN    melatonin, 6 mg, Oral, Nightly PRN    promethazine, 12.5 mg, Oral, Q6H PRN    sodium chloride 0.9%, 10 mL, Intravenous, PRN    Past Medical History:   Diagnosis Date    Actinic keratosis     Benign prostatic  hyperplasia     Chronic kidney disease     Stage 3     Fibromyositis     Gouty arthropathy     Hyperlipidemia     Hypertension     Osteoarthritis of knee      Past Surgical History:   Procedure Laterality Date    APPENDECTOMY      [email or web address]  02/21/2011    COLONOSCOPY      COLONOSCOPY N/A 08/01/2022    Procedure: COLONOSCOPY;  Surgeon: Edgar May MD;  Location: [facility];  Service: Endoscopy;  Laterality: N/A;    ESOPHAGOGASTRODUODENOSCOPY N/A 08/01/2022    Procedure: EGD (ESOPHAGOGASTRODUODENOSCOPY);  Surgeon: Edgar May MD;  Location: [facility];  Service: Endoscopy;  Laterality: N/A;    ESOPHAGOGASTRODUODENOSCOPY N/A 9/12/2022    Procedure: EGD (ESOPHAGOGASTRODUODENOSCOPY);  Surgeon: Edgar May MD;  Location: [facility];  Service: Endoscopy;  Laterality: N/A;    ESOPHAGOGASTRODUODENOSCOPY N/A 3/26/2025    Procedure: EGD (ESOPHAGOGASTRODUODENOSCOPY);  Surgeon: Roxana Stuart MD;  Location: [facility];  Service: Endoscopy;  Laterality: N/A;    UPPER GASTROINTESTINAL ENDOSCOPY       Family History   Problem Relation Name Age of Onset    Hypertension Mother      Coronary artery disease Mother      Colon cancer Neg Hx      Crohn's disease Neg Hx      Ulcerative colitis Neg Hx      Stomach cancer Neg Hx      Esophageal cancer Neg Hx       Social History[2]    OBJECTIVE:     Vital Signs and IO:  Temp:  [97 °F (36.1 °C)-98.3 °F (36.8 °C)]   Pulse:  []   Resp:  [15-28]   BP: (102-153)/(62-76)   SpO2:  [94 %-100 %]   I/O last 3 completed shifts:  In: 320 [P.O.:320]  Out: 600 [Urine:600]  Wt Readings from Last 5 Encounters:   05/12/25 84.8 kg (187 lb)   04/21/25 85.7 kg (189 lb)   03/26/25 85 kg (187 lb 6.3 oz)   09/12/23 85 kg (187 lb 6.3 oz)   08/10/23 84.7 kg (186 lb 11.7 oz)     Body mass index is 30.18 kg/m².    Physical Exam  Constitutional:       General: Patient is not in acute distress.     Appearance: Patient is well-developed. She is not diaphoretic.   HENT:      Head:  Normocephalic and atraumatic.      Mouth/Throat: Mucous membranes are moist.   Eyes:      General: No scleral icterus.     Pupils: Pupils are equal, round, and reactive to light.   Cardiovascular:      Rate and Rhythm: Normal rate and regular rhythm.   Pulmonary:      Effort: Pulmonary effort is normal. No respiratory distress.      Breath sounds: No stridor.   Abdominal:      General: There is no distension.      Palpations: Abdomen is soft.   Musculoskeletal:         General: No deformity. Normal range of motion.      Cervical back: Neck supple.   Skin:     General: Skin is warm and dry. + edema     Findings: No rash present. No erythema.   Neurological:      Mental Status: Patient is alert and oriented to person, place, and time.      Cranial Nerves: No cranial nerve deficit.   Psychiatric:         Behavior: Behavior normal.          Patient care time was spent personally by me on the following activities:     Obtaining a history.  Examination of patient.  Providing medical care at the patients bedside.  Developing a treatment plan with patient or surrogate and bedside caregivers.  Ordering and reviewing laboratory studies, radiographic studies, pulse oximetry.  Ordering and performing treatments and interventions.  Evaluation of patient's response to treatment.  Discussions with consultants while on the unit and immediately available to the patient.  Re-evaluation of the patient's condition.  Documentation in the medical record.     Rolo Beaulieu MD    [facility]  [address] [place] [address]  [place] [address]    [phone] - tel  [phone] - fax    5/13/2025          [1]   No current facility-administered medications on file prior to encounter.     Current Outpatient Medications on File Prior to Encounter   Medication Sig Dispense Refill    allopurinoL (ZYLOPRIM) 300 MG tablet TAKE 1 TABLET BY MOUTH EVERY DAY (Patient taking differently: Take 300 mg by mouth nightly.) 90 tablet 0    antiox.mv  no.10/omeg3s/lut/castro (I-CAPS ORAL) Take 1 tablet by mouth every evening.      apixaban (ELIQUIS) 2.5 mg Tab Take 1 tablet (2.5 mg total) by mouth 2 (two) times daily. 180 tablet 1    cholestyramine, with sugar, 4 gram Powd Take 1 Scoop by mouth once daily. (Patient taking differently: Take 1 Scoop by mouth every morning.) 3 each 3    docusate sodium (COLACE) 100 MG capsule Take 100 mg by mouth every morning.      furosemide (LASIX) 20 MG tablet Take 1 tablet (20 mg total) by mouth once daily. (Patient taking differently: Take 40 mg by mouth once daily.) 90 tablet 1    latanoprost 0.005 % ophthalmic solution Place 1 drop into both eyes every evening.      pantoprazole (PROTONIX) 40 MG tablet Take 1 tablet (40 mg total) by mouth once daily. 90 tablet 3    polyethylene glycol (GLYCOLAX) 17 gram PwPk Take 17 g by mouth once daily. (Patient taking differently: Take 17 g by mouth daily as needed for Constipation.) 100 packet 3    tamsulosin (FLOMAX) 0.4 mg Cap TAKE 1 CAPSULE BY MOUTH EVERY DAY (Patient taking differently: Take 1 capsule by mouth nightly.) 90 capsule 0    furosemide (LASIX) 20 MG tablet Take one of these addl Lasix 20mg tablets with your regularly scheduled Lasix tabs daily for 10 days, then return to Lasix 20mg daily. 10 tablet 0   [2]   Social History  Tobacco Use    Smoking status: Former     Current packs/day: 0.00     Types: Cigarettes     Quit date:      Years since quittin.3    Smokeless tobacco: Former     Types: Chew   Substance Use Topics    Alcohol use: Yes     Comment: occasionally    Drug use: Never

## 2025-05-13 NOTE — PROGRESS NOTES
[facility]  Progress Note    Patient Name: Vel Banda  MRN: [id number]  Patient Class: IP- Inpatient   Admission Date: 5/12/2025  Length of Stay: 0 days  Attending Physician: Bibi Castaneda, *  Primary Care Provider: Thalia Felton MD        Subjective     Principal Problem:Acute on chronic heart failure        HPI:  89-year-old male presented to ED from Cardiology office for eval of fluid overload.  pMHx AF, HF, CKD, aortic valve stenosis, HLD, LBBB, anemia, gout, BPH.  Patient evaluated by Cardiology outpatient earlier today for shortness of breath and lower extremity edema that has been progressively worsening since March of this year, patient subsequently advised to go to ED for diuresis and Cardiology follow-up.  Patient has history of HFpEF, echo 3/25/2025 with EF 50-55%, on Lasix outpatient, patient's grandson at bedside reports compliance with Lasix, states patient is still not diuresing well even if given extra dose of p.o. Lasix.  Patient also with history of AF, on Eliquis outpatient.  It is reported patient has been reluctant to continue attending outpatient doctor visits, discussed hospice consult on exam, patient family are agreeable to hospice consult but would like to continue with medical treatment at present and in the interim.  In ED today, BNP 1745.  Initial troponin 72.  UA with UTI. CXR impression with hypoinflation with cardiomegaly and small pleural effusions; prominence of the pulmonary vasculature suggestive of edema. BLE venous U.S. impression with no evidence of DVT.  Per Cardiology recs patient given Lasix x1 IVP and gtt started admit to hospital medicine for further eval.    Overview/Hospital Course:  89-year-old male admitted for possible CHF exacerbation.  He was started on Lasix drip for diuresis.  Also found to have UTI and was started on antibiotics.  Urine culture closely followed and growing Gram-negative reji.  Cardiology and  Nephrology were consulted.  Patient's healthcare proxy, patient's grandson at bedside mentioned they would want to consider hospice care, case management and  notified, spacer referrals sent.  Palliative care consulted, appreciate recommendations.    Interval History:  Patient is seen and examined. On Lasix drip, has significant pedal edema and bilateral crackles.  Urine culture growing Gram-negative reji, lactose , final speciation pending.    Discussed at bedside with the patient's grandson, patient's POA - he mentioned they would want to pursue hospice but they can not take care of the patient at home,  Eufemia clearly explained various inpatient hospice options available, grandson wants to talk to his mother (patient's daughter) to take further decision.  We called her on phone while evaluating the patient and discussed the prognosis.  She also mentioned that they can not take care him at home.  At this time, he mentioned to hold off on any aggressive treatment but can continue current treatment.  Nephrology also mentioned he is not a great candidate for dialysis and hospice would be appropriate option.  Palliative care consulted to further discuss with the family, appreciate recommendations.        Review of Systems  Objective:     Vital Signs (Most Recent):  Temp: 97 °F (36.1 °C) (05/13/25 0809)  Pulse: 95 (05/13/25 0809)  Resp: 20 (05/13/25 0723)  BP: 109/64 (05/13/25 0809)  SpO2: 96 % (05/13/25 0809) Vital Signs (24h Range):  Temp:  [97 °F (36.1 °C)-98.3 °F (36.8 °C)] 97 °F (36.1 °C)  Pulse:  [] 95  Resp:  [15-28] 20  SpO2:  [94 %-100 %] 96 %  BP: (102-153)/(62-76) 109/64     Weight:  (not able to obtain weight due to bedscale not working)  Body mass index is 30.18 kg/m².    Intake/Output Summary (Last 24 hours) at 5/13/2025 0932  Last data filed at 5/13/2025 0636  Gross per 24 hour   Intake 320 ml   Output 600 ml   Net -280 ml         Physical Exam  Vitals reviewed.    Constitutional:       General: He is sleeping. He is not in acute distress.     Appearance: He is ill-appearing.   HENT:      Head: Normocephalic and atraumatic.      Nose: Nose normal.      Mouth/Throat:      Mouth: Mucous membranes are dry.   Eyes:      Conjunctiva/sclera: Conjunctivae normal.   Cardiovascular:      Rate and Rhythm: Normal rate. Rhythm irregular.   Pulmonary:      Effort: Pulmonary effort is normal. No respiratory distress.      Breath sounds: Rales present.   Abdominal:      General: Bowel sounds are normal.      Palpations: Abdomen is soft.      Tenderness: There is no abdominal tenderness.   Musculoskeletal:         General: No tenderness. Normal range of motion.      Cervical back: Normal range of motion.      Right lower leg: Edema present.      Left lower leg: Edema present.   Skin:     General: Skin is warm and dry.      Coloration: Skin is pale.   Neurological:      Mental Status: He is easily aroused. Mental status is at baseline.               Significant Labs: All pertinent labs within the past 24 hours have been reviewed.    Significant Imaging: I have reviewed all pertinent imaging results/findings within the past 24 hours.      Assessment & Plan  Acute on chronic heart failure  Patient is identified as having Diastolic (HFpEF) heart failure that is Acute on Chronic. CHF is currently uncontrolled due to volume overload due to: Continued edema of extremities, Rales/crackles on pulmonary exam, and Pulmonary edema/pleural effusion on CXR. Latest ECHO performed and demonstrates- Results for orders placed during the hospital encounter of 03/25/25    Echo    Interpretation Summary    Left Ventricle: Left ventricle was not well visualized due to poor sonic window. The left ventricle is at the upper limits of normal in size. Increased wall thickness. There is concentric hypertrophy. Septal motion is consistent with bundle branch block. There is low normal systolic function with a visually  estimated ejection fraction of 50 - 55%. Unable to assess diastolic function due to atrial fibrillation.    Right Ventricle: The right ventricle is normal in size. Systolic function is normal.    Aortic Valve: The aortic valve is a trileaflet valve. There is severe aortic valve sclerosis. Moderately calcified right cusp. Moderately restricted motion. There is mild stenosis. Aortic valve area by VTI is 1.2 cm². Aortic valve area by velocity is 1.1 cm². Aortic valve peak velocity is 2.4 m/s. Mean gradient is 13 mmHg. Aortic Valve peak gradient is 23 mmHg. The dimensionless index is 0.48. There is mild aortic regurgitation.    Mitral Valve: There is mild to moderate regurgitation.    Tricuspid Valve: There is moderate regurgitation. There is mild pulmonary hypertension. The estimated PA systolic pressure is at least 44 mmHg.      . Continue Furosemide drip and monitor clinical status closely. Monitor on telemetry. Patient is on CHF pathway.  Monitor strict Is&Os and daily weights.  Place on fluid restriction of 1.5 L. Cardiology is consulted. Continue to stress to patient importance of self efficacy and  on diet for CHF. Last BNP reviewed- and noted below   Recent Labs   Lab 05/12/25 1424   BNP 1,745*   .  Not a dialysis candidate  Family pursuing hospice care       Stage 3 chronic kidney disease  Creatine stable for now. CMP reviewed- noted Estimated Creatinine Clearance: 20.1 mL/min (A) (based on SCr of 2.1 mg/dL (H)). according to latest data. Based on current GFR, CKD stage is stage 3 - GFR 30-59.  Monitor UOP and serial CMP and adjust therapy as needed. Renally dose meds. Avoid nephrotoxic medications and procedures.    Family pursuing hospice care  Iron deficiency anemia, unspecified  Anemia is likely due to Iron deficiency and chronic disease due to Chronic Kidney Disease. Most recent hemoglobin and hematocrit are listed below.  Recent Labs     05/12/25  1424 05/13/25  0719   HGB 8.9* 8.0*   HCT 32.2*  27.8*     Plan  - Monitor serial CBC: Daily  - Transfuse PRBC if patient becomes hemodynamically unstable, symptomatic or H/H drops below 7/21.  - Patient has received  PRBCs on in the past  - Patient's anemia is currently stable    Atrial fibrillation  Patient has persistent (7 days or more) atrial fibrillation. Patient is currently in atrial fibrillation. [id number] Score: 2. The patients heart rate in the last 24 hours is as follows:  Pulse  Min: 88  Max: 99     Antiarrhythmics       Anticoagulants  apixaban tablet 2.5 mg, 2 times daily, Oral    Plan  - Replete lytes with a goal of K>4, Mg >2  - Patient is anticoagulated, see medications listed above.  - Patient's afib is currently controlled  - Monitor coags      LBBB (left bundle branch block)  Noted on EKG 3/25/25 and noted in cardiac hx  EKG PRN  Tele monitoring    UTI (urinary tract infection)  Urine cx growing Gram-negative reji  Rocephin  Bladder scan for PVR  I&O  Prolonged QT interval  Avoid qt prolonging meds  Tele monitoring  Repeat EKG in a.m.    Advanced care planning/counseling discussion  I spent 25 minutes of face to face discussion regarding advance directives and end of life planning which included patient and family. Patient/Family understands the seriousness of their condition and would like to make their end of life decisions known as follows- DNR, would like to pursue hospice, under leak into different options, and wants to continue with current treatment without any escalation  VTE Risk Mitigation (From admission, onward)           Ordered     apixaban tablet 2.5 mg  2 times daily         05/12/25 1734     Reason for No Pharmacological VTE Prophylaxis  Once        Question:  Reasons:  Answer:  Already adequately anticoagulated on oral Anticoagulants    05/12/25 1734     IP VTE HIGH RISK PATIENT  Once         05/12/25 1734     Place sequential compression device  Until discontinued         05/12/25 1734                    Discharge Planning    NADEEN: 5/15/2025     Code Status: DNR   Medical Readiness for Discharge Date:   Discharge Plan A: Inpatient Hospice   Discharge Delays: None known at this time                    Bibi Castaneda MD  Department of Hospital Medicine   [facility]

## 2025-05-13 NOTE — CONSULTS
MANINDER met with patient's grandchild, Sharif Edmondson, at bedside to discuss the plan of hospice at home. Sharif had his mother on the phone to confirm they would like to have an informative with a hospice agency in the [facility]. The family reported they did not have an agency in mind due to this being the first time needing hospice. Sharif's mother reported patient's wish is to be home with family and not to pass away in a hospital.     MANINDER spoke with Arabella at [facility] about providing an informational to the family regarding their services. Arabella reported she will meet with the family at bedside.

## 2025-05-13 NOTE — PT/OT/SLP PROGRESS
Physical Therapy      Patient Name:  Vel Banda   MRN:  [id number]    Patient not seen today secondary to Other (Comment) (patient/family have elected to discharge home with hospice, acute care PT eval not appropriate at this time.). Will follow-up if discharge plan changes.

## 2025-05-13 NOTE — ASSESSMENT & PLAN NOTE
Patient has persistent (7 days or more) atrial fibrillation. Patient is currently in atrial fibrillation. [id number] Score: 2. The patients heart rate in the last 24 hours is as follows:  Pulse  Min: 88  Max: 99     Antiarrhythmics       Anticoagulants  apixaban tablet 2.5 mg, 2 times daily, Oral    Plan  - Replete lytes with a goal of K>4, Mg >2  - Patient is anticoagulated, see medications listed above.  - Patient's afib is currently controlled  - Monitor coags

## 2025-05-13 NOTE — PLAN OF CARE
WHITE signed and scanned into [employer].      05/13/25 1246   WHITE Message   Medicare Outpatient and Observation Notification regarding financial responsibility Explained to patient/caregiver;Signed/date by patient/caregiver   Date WHITE was signed 05/13/25   Time WHITE was signed [id number]

## 2025-05-13 NOTE — ASSESSMENT & PLAN NOTE
Anemia is likely due to Iron deficiency and chronic disease due to Chronic Kidney Disease. Most recent hemoglobin and hematocrit are listed below.  Recent Labs     05/12/25  1424 05/13/25  0719   HGB 8.9* 8.0*   HCT 32.2* 27.8*     Plan  - Monitor serial CBC: Daily  - Transfuse PRBC if patient becomes hemodynamically unstable, symptomatic or H/H drops below 7/21.  - Patient has received  PRBCs on in the past  - Patient's anemia is currently stable

## 2025-05-14 VITALS
RESPIRATION RATE: 20 BRPM | BODY MASS INDEX: 30.04 KG/M2 | WEIGHT: 186.94 LBS | SYSTOLIC BLOOD PRESSURE: 124 MMHG | DIASTOLIC BLOOD PRESSURE: 80 MMHG | HEIGHT: 66 IN | TEMPERATURE: 98 F | HEART RATE: 103 BPM | OXYGEN SATURATION: 95 %

## 2025-05-14 PROBLEM — Z71.89 GOALS OF CARE, COUNSELING/DISCUSSION: Status: ACTIVE | Noted: 2025-05-14

## 2025-05-14 LAB
ABSOLUTE EOSINOPHIL (SMH): 0.06 K/UL
ABSOLUTE MONOCYTE (SMH): 0.48 K/UL (ref 0.3–1)
ABSOLUTE NEUTROPHIL COUNT (SMH): 5.5 K/UL (ref 1.8–7.7)
ALBUMIN SERPL-MCNC: 3 G/DL (ref 3.5–5.2)
ALP SERPL-CCNC: 76 UNIT/L (ref 55–135)
ALT SERPL-CCNC: 26 UNIT/L (ref 10–44)
ANION GAP (SMH): 12 MMOL/L (ref 8–16)
AST SERPL-CCNC: 19 UNIT/L (ref 10–40)
BACTERIA UR CULT: ABNORMAL
BASOPHILS # BLD AUTO: 0.01 K/UL
BASOPHILS NFR BLD AUTO: 0.1 %
BILIRUB SERPL-MCNC: 0.8 MG/DL (ref 0.1–1)
BUN SERPL-MCNC: 58 MG/DL (ref 10–30)
CALCIUM SERPL-MCNC: 8.7 MG/DL (ref 8.7–10.5)
CHLORIDE SERPL-SCNC: 99 MMOL/L (ref 95–110)
CO2 SERPL-SCNC: 29 MMOL/L (ref 23–29)
CREAT SERPL-MCNC: 2.1 MG/DL (ref 0.5–1.4)
ERYTHROCYTE [DISTWIDTH] IN BLOOD BY AUTOMATED COUNT: 21.1 % (ref 11.5–14.5)
GFR SERPLBLD CREATININE-BSD FMLA CKD-EPI: 28 ML/MIN/1.73/M2
GLUCOSE SERPL-MCNC: 87 MG/DL (ref 70–110)
HCT VFR BLD AUTO: 28.3 % (ref 40–54)
HGB BLD-MCNC: 8.1 GM/DL (ref 14–18)
IMM GRANULOCYTES # BLD AUTO: 0.04 K/UL (ref 0–0.04)
IMM GRANULOCYTES NFR BLD AUTO: 0.6 % (ref 0–0.5)
INR PPP: 1.2 (ref 0.8–1.2)
LYMPHOCYTES # BLD AUTO: 0.56 K/UL (ref 1–4.8)
MCH RBC QN AUTO: 21.6 PG (ref 27–31)
MCHC RBC AUTO-ENTMCNC: 28.6 G/DL (ref 32–36)
MCV RBC AUTO: 76 FL (ref 82–98)
NUCLEATED RBC (/100WBC) (SMH): 0 /100 WBC
OHS QRS DURATION: 180 MS
OHS QTC CALCULATION: 523 MS
PLATELET # BLD AUTO: 212 K/UL (ref 150–450)
PMV BLD AUTO: 10.1 FL (ref 9.2–12.9)
POTASSIUM SERPL-SCNC: 3.2 MMOL/L (ref 3.5–5.1)
PROT SERPL-MCNC: 5.9 GM/DL (ref 6–8.4)
PROTHROMBIN TIME: 13.5 SECONDS (ref 9–12.5)
RBC # BLD AUTO: 3.75 M/UL (ref 4.6–6.2)
RELATIVE EOSINOPHIL (SMH): 0.9 % (ref 0–8)
RELATIVE LYMPHOCYTE (SMH): 8.4 % (ref 18–48)
RELATIVE MONOCYTE (SMH): 7.2 % (ref 4–15)
RELATIVE NEUTROPHIL (SMH): 82.8 % (ref 38–73)
SODIUM SERPL-SCNC: 140 MMOL/L (ref 136–145)
WBC # BLD AUTO: 6.69 K/UL (ref 3.9–12.7)

## 2025-05-14 PROCEDURE — 85025 COMPLETE CBC W/AUTO DIFF WBC: CPT

## 2025-05-14 PROCEDURE — 25000003 PHARM REV CODE 250

## 2025-05-14 PROCEDURE — 99232 SBSQ HOSP IP/OBS MODERATE 35: CPT | Mod: ,,, | Performed by: INTERNAL MEDICINE

## 2025-05-14 PROCEDURE — 36415 COLL VENOUS BLD VENIPUNCTURE: CPT

## 2025-05-14 PROCEDURE — 85610 PROTHROMBIN TIME: CPT

## 2025-05-14 PROCEDURE — 80053 COMPREHEN METABOLIC PANEL: CPT

## 2025-05-14 RX ORDER — CEPHALEXIN 500 MG/1
500 CAPSULE ORAL DAILY
Qty: 5 CAPSULE | Refills: 0 | Status: SHIPPED | OUTPATIENT
Start: 2025-05-14 | End: 2025-05-19

## 2025-05-14 RX ORDER — FUROSEMIDE 40 MG/1
80 TABLET ORAL DAILY
Status: DISCONTINUED | OUTPATIENT
Start: 2025-05-15 | End: 2025-05-14 | Stop reason: HOSPADM

## 2025-05-14 RX ORDER — FUROSEMIDE 40 MG/1
40 TABLET ORAL DAILY
Status: DISCONTINUED | OUTPATIENT
Start: 2025-05-15 | End: 2025-05-14

## 2025-05-14 RX ORDER — METOPROLOL SUCCINATE 25 MG/1
12.5 TABLET, EXTENDED RELEASE ORAL DAILY
Qty: 45 TABLET | Refills: 0 | Status: SHIPPED | OUTPATIENT
Start: 2025-05-14 | End: 2025-08-12

## 2025-05-14 RX ORDER — FUROSEMIDE 80 MG/1
80 TABLET ORAL DAILY
Qty: 30 TABLET | Refills: 2 | Status: SHIPPED | OUTPATIENT
Start: 2025-05-14 | End: 2025-08-12

## 2025-05-14 RX ORDER — ALLOPURINOL 300 MG/1
300 TABLET ORAL DAILY
Start: 2025-05-14 | End: 2025-06-13

## 2025-05-14 RX ORDER — ALLOPURINOL 100 MG/1
100 TABLET ORAL DAILY
Qty: 30 TABLET | Refills: 0 | Status: SHIPPED | OUTPATIENT
Start: 2025-05-14 | End: 2025-05-14

## 2025-05-14 RX ADMIN — APIXABAN 2.5 MG: 2.5 TABLET, FILM COATED ORAL at 09:05

## 2025-05-14 RX ADMIN — PANTOPRAZOLE SODIUM 40 MG: 40 TABLET, DELAYED RELEASE ORAL at 06:05

## 2025-05-14 RX ADMIN — CHOLESTYRAMINE 4 GRAMS OF ANHYDROUS CHOLESTYRAMINE: 4 POWDER, FOR SUSPENSION ORAL at 09:05

## 2025-05-14 RX ADMIN — DOCUSATE SODIUM 100 MG: 100 CAPSULE, LIQUID FILLED ORAL at 07:05

## 2025-05-14 NOTE — NURSING
Patient is discharged home on hospice. He has all his belongings and is in no distress. Patient will be transferred home via [facility] ([facility]).

## 2025-05-14 NOTE — PROGRESS NOTES
Nephrology Consult Note        Patient Name: Vel Banda  MRN: [id number]    Patient Class: IP- Inpatient   Admission Date: 5/12/2025  Length of Stay: 1 days  Date of Service: 5/14/2025    Attending Physician: Jos Gomez MD  Primary Care Provider: Thalia Felton MD    Reason for Consult: chf exacerbation, ckd stage 4    SUBJECTIVE:     HPI: 89M with pMHx AF, CHF, CKD, AVS, HLD, LBBB, anemia, gout, BPH presented to ED from Cardiology office for eval of fluid overload. Patient evaluated by outpatient Cardiology for progressive shortness of breath and lower extremity edema since March of this year, and advised to go to ED for diuresis and Cardiology follow-up. Patient has history of HFpEF, echo 3/25/2025 with EF 50-55%, on Lasix outpatient. Patient's grandson at bedside reports compliance with Lasix, states patient is still not diuresing well even if given extra dose of p.o. Lasix.  Patient also with history of AF, on Eliquis outpatient.     It is reported patient has been reluctant to continue attending outpatient doctor visits, hospice consult is discussed, patient family are agreeable but would like to continue with medical treatment at present and in the interim.      In ED today, BNP 1,745. Initial troponin 72. UA with ? UTI. CXR with hypoinflation with cardiomegaly and small pleural effusions; prominence of the pulmonary vasculature suggestive of edema. BLE venous U.S. with no evidence of DVT.  Per Cardiology recs patient given Lasix x1 IVP and gtt started.     5/14 VSS. Hospice and comfort care seem reasonable alternative to aggressive care in a frail elderly patient with limited life expectancy and already diminished quality of life.    ASSESSMENT/PLAN:     UTI 2/2 GNR  HFpEF exacerbation  BPH  Gout  CKD stage 4, eGFR < 30 ml/min  No NSAIDs, ACEI/ARB, IV contrast or other nephrotoxins.  Keep MAP > 60, SBP > 100.  Dose meds for GFR < 30 ml/min.  Renal diet - low K, low phos.  Cards eval appreciated. Agree  "with lasix gtt, transition to palliative PRN dosing.   Treat UTI per Cx.  Keep Gout and BPH meds as tolerated for palliative reasons.  Would support hospice and comfort care as d/w family at bedside.    Anemia of CKD  Hgb and HCT are acceptable. Monitor for now.    MBD / Secondary HPT  Ca, Phos, PTH and vitamin D levels are acceptable.   Phos binders, vitamin D and analogues, calcimimetics will be given as needed.    HTN  Tolerate asymptomatic HTN up to -160.  Continue home meds.  Low sodium diet as tolerated.    Thank you for allowing us to participate in the care of your patient!   We will follow the patient and provide recommendations as needed.         Laboratory:  Recent Labs   Lab 05/12/25  1424 05/13/25  0719 05/14/25  0708    141 140   K 4.2 3.9 3.2*    104 99   CO2 23 25 29   BUN 64* 61* 58*   CREATININE 2.2* 2.1* 2.1*   GLU 90 85 87       Recent Labs   Lab 05/12/25  1424 05/13/25  0719 05/14/25  0708   CALCIUM 9.1 8.7 8.7   ALBUMIN 3.6 3.1* 3.0*   MG 2.2  --   --              No results for input(s): "POCTGLUCOSE" in the last 168 hours.    Recent Labs   Lab 03/10/23  0907 09/13/23  0843 05/13/25  0719   Hemoglobin A1C 4.5 4.6  --    Hemoglobin A1c  --   --  6.0       Recent Labs   Lab 05/12/25  1424 05/13/25  0719 05/14/25  0708   WBC 7.91 6.84 6.69   HGB 8.9* 8.0* 8.1*   HCT 32.2* 27.8* 28.3*    228 212   MCV 78* 76* 76*   MCHC 27.6* 28.8* 28.6*       Recent Labs   Lab 05/12/25  1424 05/13/25  0719 05/14/25  0708   BILITOT 1.1* 0.7 0.8   PROT 6.6 5.9* 5.9*   ALBUMIN 3.6 3.1* 3.0*   ALKPHOS 91 77 76   ALT 45* 34 26   AST 43* 27 19       Recent Labs   Lab 08/10/23  1134 05/12/25  1424   Color, POC UA Yellow  --    Appearance, UA  --  Hazy A   pH, POC UA 5.5  --    Clarity, POC UA Clear  --    Spec Grav POC UA 1.025  --    Spec Grav UA  --  1.020   Protein, UA  --  Trace A   Ketones, POC UA Negative  --    Urobilinogen, POC UA 0.2  --    Urobilinogen, UA  --  Negative   Bilirubin, " UA  --  Negative   Nitrite, POC UA Negative  --    RBC, UA  --  1   WBC, UA  --  54 H   Bacteria, UA  --  Occasional             Microbiology Results (last 7 days)       Procedure Component Value Units Date/Time    Urine culture [[id number]]  (Abnormal)  (Susceptibility) Collected: 05/12/25 1424    Order Status: Completed Specimen: Urine Updated: 05/14/25 0639     Urine Culture >100,000 cfu/ml Escherichia coli            Review of patient's allergies indicates:   Allergen Reactions    Pcn [penicillins]        Outpatient meds:  Medications Ordered Prior to Encounter[1]    Scheduled meds:   allopurinoL  300 mg Oral Nightly    apixaban  2.5 mg Oral BID    cefTRIAXone (Rocephin) IV (PEDS and ADULTS)  2 g Intravenous Q24H    cholestyramine-aspartame  1 packet Oral BID    docusate sodium  100 mg Oral QAM    latanoprost  1 drop Both Eyes QHS    LIDOcaine  1 patch Transdermal Q24H    pantoprazole  40 mg Oral Daily    tamsulosin  1 capsule Oral Nightly       Infusions:   furosemide (LASIX) 2 mg/mL continuous infusion (titrating)  10 mg/hr Intravenous Continuous 5 mL/hr at 05/13/25 2204 10 mg/hr at 05/13/25 2204       PRN meds:    Current Facility-Administered Medications:     acetaminophen, 650 mg, Oral, Q6H PRN    bisacodyL, 10 mg, Rectal, Daily PRN    melatonin, 6 mg, Oral, Nightly PRN    promethazine, 12.5 mg, Oral, Q6H PRN    sodium chloride 0.9%, 10 mL, Intravenous, PRN    Past Medical History:   Diagnosis Date    Actinic keratosis     Benign prostatic hyperplasia     Chronic kidney disease     Stage 3     Fibromyositis     Gouty arthropathy     Hyperlipidemia     Hypertension     Osteoarthritis of knee      Past Surgical History:   Procedure Laterality Date    APPENDECTOMY      [email or web address]  02/21/2011    COLONOSCOPY      COLONOSCOPY N/A 08/01/2022    Procedure: COLONOSCOPY;  Surgeon: Edgar May MD;  Location: [facility];  Service: Endoscopy;  Laterality: N/A;    ESOPHAGOGASTRODUODENOSCOPY N/A 08/01/2022     Procedure: EGD (ESOPHAGOGASTRODUODENOSCOPY);  Surgeon: Edgar May MD;  Location: [facility];  Service: Endoscopy;  Laterality: N/A;    ESOPHAGOGASTRODUODENOSCOPY N/A 9/12/2022    Procedure: EGD (ESOPHAGOGASTRODUODENOSCOPY);  Surgeon: Edgar May MD;  Location: [facility] ENDO;  Service: Endoscopy;  Laterality: N/A;    ESOPHAGOGASTRODUODENOSCOPY N/A 3/26/2025    Procedure: EGD (ESOPHAGOGASTRODUODENOSCOPY);  Surgeon: Roxana Stuart MD;  Location: [facility];  Service: Endoscopy;  Laterality: N/A;    UPPER GASTROINTESTINAL ENDOSCOPY       Family History   Problem Relation Name Age of Onset    Hypertension Mother      Coronary artery disease Mother      Colon cancer Neg Hx      Crohn's disease Neg Hx      Ulcerative colitis Neg Hx      Stomach cancer Neg Hx      Esophageal cancer Neg Hx       Social History[2]    OBJECTIVE:     Vital Signs and IO:  Temp:  [98.2 °F (36.8 °C)-98.3 °F (36.8 °C)]   Pulse:  []   Resp:  [20]   BP: (102-116)/(58-68)   SpO2:  [94 %-98 %]   I/O last 3 completed shifts:  In: 960 [P.O.:960]  Out: 3400 [Urine:3400]  Wt Readings from Last 5 Encounters:   05/12/25 84.8 kg (187 lb)   04/21/25 85.7 kg (189 lb)   03/26/25 85 kg (187 lb 6.3 oz)   09/12/23 85 kg (187 lb 6.3 oz)   08/10/23 84.7 kg (186 lb 11.7 oz)     Body mass index is 30.18 kg/m².    Physical Exam  Constitutional:       General: Patient is not in acute distress.     Appearance: Patient is well-developed. She is not diaphoretic.   HENT:      Head: Normocephalic and atraumatic.      Mouth/Throat: Mucous membranes are moist.   Eyes:      General: No scleral icterus.     Pupils: Pupils are equal, round, and reactive to light.   Cardiovascular:      Rate and Rhythm: Normal rate and regular rhythm.   Pulmonary:      Effort: Pulmonary effort is normal. No respiratory distress.      Breath sounds: No stridor.   Abdominal:      General: There is no distension.      Palpations: Abdomen is soft.   Musculoskeletal:         General:  No deformity. Normal range of motion.      Cervical back: Neck supple.   Skin:     General: Skin is warm and dry. + edema     Findings: No rash present. No erythema.   Neurological:      Mental Status: Patient is NOT alert and oriented to person, place, and time.      Cranial Nerves: No cranial nerve deficit.   Psychiatric:         Behavior: Behavior normal.          Patient care time was spent personally by me on the following activities:     Obtaining a history.  Examination of patient.  Providing medical care at the patients bedside.  Developing a treatment plan with patient or surrogate and bedside caregivers.  Ordering and reviewing laboratory studies, radiographic studies, pulse oximetry.  Ordering and performing treatments and interventions.  Evaluation of patient's response to treatment.  Discussions with consultants while on the unit and immediately available to the patient.  Re-evaluation of the patient's condition.  Documentation in the medical record.     Rolo Beaulieu MD    [facility]  [address] [place] [address]  [place] [address]    [phone] - tel  [phone] - fax    5/14/2025          [1]   No current facility-administered medications on file prior to encounter.     Current Outpatient Medications on File Prior to Encounter   Medication Sig Dispense Refill    allopurinoL (ZYLOPRIM) 300 MG tablet TAKE 1 TABLET BY MOUTH EVERY DAY (Patient taking differently: Take 300 mg by mouth nightly.) 90 tablet 0    antiox.mv no.10/omeg3s/lut/castro (I-CAPS ORAL) Take 1 tablet by mouth every evening.      apixaban (ELIQUIS) 2.5 mg Tab Take 1 tablet (2.5 mg total) by mouth 2 (two) times daily. 180 tablet 1    cholestyramine, with sugar, 4 gram Powd Take 1 Scoop by mouth once daily. (Patient taking differently: Take 1 Scoop by mouth every morning.) 3 each 3    docusate sodium (COLACE) 100 MG capsule Take 100 mg by mouth every morning.      furosemide (LASIX) 20 MG tablet Take 1 tablet (20 mg total) by  mouth once daily. (Patient taking differently: Take 40 mg by mouth once daily.) 90 tablet 1    latanoprost 0.005 % ophthalmic solution Place 1 drop into both eyes every evening.      pantoprazole (PROTONIX) 40 MG tablet Take 1 tablet (40 mg total) by mouth once daily. 90 tablet 3    polyethylene glycol (GLYCOLAX) 17 gram PwPk Take 17 g by mouth once daily. (Patient taking differently: Take 17 g by mouth daily as needed for Constipation.) 100 packet 3    tamsulosin (FLOMAX) 0.4 mg Cap TAKE 1 CAPSULE BY MOUTH EVERY DAY (Patient taking differently: Take 1 capsule by mouth nightly.) 90 capsule 0    furosemide (LASIX) 20 MG tablet Take one of these addl Lasix 20mg tablets with your regularly scheduled Lasix tabs daily for 10 days, then return to Lasix 20mg daily. 10 tablet 0   [2]   Social History  Tobacco Use    Smoking status: Former     Current packs/day: 0.00     Types: Cigarettes     Quit date:      Years since quittin.4    Smokeless tobacco: Former     Types: Chew   Substance Use Topics    Alcohol use: Yes     Comment: occasionally    Drug use: Never

## 2025-05-14 NOTE — HPI
From admission HPI:  89-year-old male presented to ED from Cardiology office for eval of fluid overload.  pMHx AF, HF, CKD, aortic valve stenosis, HLD, LBBB, anemia, gout, BPH.  Patient evaluated by Cardiology outpatient earlier today for shortness of breath and lower extremity edema that has been progressively worsening since March of this year, patient subsequently advised to go to ED for diuresis and Cardiology follow-up.  Patient has history of HFpEF, echo 3/25/2025 with EF 50-55%, on Lasix outpatient, patient's grandson at bedside reports compliance with Lasix, states patient is still not diuresing well even if given extra dose of p.o. Lasix.  Patient also with history of AF, on Eliquis outpatient.  It is reported patient has been reluctant to continue attending outpatient doctor visits, discussed hospice consult on exam, patient family are agreeable to hospice consult but would like to continue with medical treatment at present and in the interim.  In ED today, BNP 1745.  Initial troponin 72.  UA with UTI. CXR impression with hypoinflation with cardiomegaly and small pleural effusions; prominence of the pulmonary vasculature suggestive of edema. BLE venous U.S. impression with no evidence of DVT.  Per Cardiology recs patient given Lasix x1 IVP and gtt started admit to hospital medicine for further eval.       Palliative medicine consult:  Pt admitted for CHF exac and UTI. Pt is not responding well to IV diuresing. He has been eval by nephrology and deemed not a good candidate for HD. Family considering hospice. We have been consulted for goals of care discussion.

## 2025-05-14 NOTE — CONSULTS
[facility]  Wound Care    Patient Name:  Vel Banda   MRN:  [id number]  Date: 5/14/2025  Diagnosis: Acute on chronic heart failure    History:     Past Medical History:   Diagnosis Date    Actinic keratosis     Benign prostatic hyperplasia     Chronic kidney disease     Stage 3     Fibromyositis     Gouty arthropathy     Hyperlipidemia     Hypertension     Osteoarthritis of knee        Social History[1]    Precautions:     Allergies as of 05/12/2025 - Reviewed 05/12/2025   Allergen Reaction Noted    Pcn [penicillins]  04/13/2014       WOC Assessment Details/Treatment     Wound care consulted for wounds present on admit. Family at bedside reports patient has had a poor appetite for some time now and he reports he tries to give him grits because the patient likes grits. Discussed the importance of protein in patients diet and patients family stated the patient loves to drink ensures either vanilla or strawberry.        05/13/25 1716   WOCN Assessment   WOCN Total Time (mins) 30   Visit Date 05/14/25   Visit Time 1638   Consult Type New   WOCN Speciality Wound   Wound pressure;moisture   Number of Wounds 5   Intervention assessed;applied;chart review;coordination of care;team conference;orders   Teaching on-going   Pressure Injury Prevention    Sacral Foam Dressing Patient incontinent, barrier cream applied        Wound 05/12/25 Pressure Injury Sacral spine   Date First Assessed: 05/12/25   Present on Original Admission: Yes  Primary Wound Type: Pressure Injury  Location: Sacral spine   Wound Image    Pressure Injury Stage DTPI   Dressing Appearance Open to air   Drainage Amount None   Appearance Red;Maroon;Purple   Periwound Area Redness   Care Cleansed with:;Antimicrobial agent;Soap and water;Applied:;Skin Barrier   Dressing Applied  (Triad)        Wound 05/12/25 Pressure Injury Left posterior Heel   Date First Assessed: 05/12/25   Present on Original Admission: Yes  Primary Wound Type: Pressure  Injury  Side: Left  Orientation: posterior  Location: Heel   Wound Image    Pressure Injury Stage 1   Dressing Appearance Open to air   Drainage Amount None   Appearance Red   Periwound Area Redness   Care Cleansed with:;Antimicrobial agent;Soap and water   Off Loading Heel lift boot;Pillow        Wound 05/12/25 Pressure Injury Right posterior Heel   Date First Assessed: 05/12/25   Present on Original Admission: Yes  Primary Wound Type: Pressure Injury  Side: Right  Orientation: posterior  Location: Heel   Wound Image    Pressure Injury Stage 1   Dressing Appearance Open to air   Drainage Amount None   Appearance Red   Periwound Area Redness   Care Cleansed with:;Antimicrobial agent;Soap and water   Off Loading Heel lift boot;Pillow        Wound 05/12/25 Moisture associated dermatitis Left Groin   Date First Assessed: 05/12/25   Present on Original Admission: Yes  Primary Wound Type: Moisture associated dermatitis  Side: Left  Location: Groin   Wound Image    Dressing Appearance Open to air   Drainage Amount None   Appearance Red;Maroon   Care Cleansed with:;Antimicrobial agent;Soap and water;Applied:   Dressing Applied;Other (comment)  (antifungal powder)        Wound 05/12/25 Moisture associated dermatitis Right Groin   Date First Assessed: 05/12/25   Present on Original Admission: Yes  Primary Wound Type: Moisture associated dermatitis  Side: Right  Location: Groin   Wound Image    Dressing Appearance Open to air   Drainage Amount None   Appearance Red   Periwound Area Redness   Care Cleansed with:;Antimicrobial agent;Soap and water;Applied:   Dressing Applied;Other (comment)  (antifungal powder)       Recommendations made to primary team for Triad to sacrum, miconazole powder to bilateral groins and offloading heels with offloading heel boots. Patient would also benefit from oral protein supplements for skin needs.      05/14/2025         [1]   Social History  Socioeconomic History    Marital status:     Tobacco Use    Smoking status: Former     Current packs/day: 0.00     Types: Cigarettes     Quit date:      Years since quittin.4    Smokeless tobacco: Former     Types: Chew   Substance and Sexual Activity    Alcohol use: Yes     Comment: occasionally    Drug use: Never    Sexual activity: Not Currently     Social Drivers of Health     Financial Resource Strain: Patient Declined (2025)    Overall Financial Resource Strain (CARDIA)     Difficulty of Paying Living Expenses: Patient declined   Food Insecurity: Patient Declined (2025)    Hunger Vital Sign     Worried About Running Out of Food in the Last Year: Patient declined     Ran Out of Food in the Last Year: Patient declined   Transportation Needs: Patient Declined (2025)    PRAPARE - Transportation     Lack of Transportation (Medical): Patient declined     Lack of Transportation (Non-Medical): Patient declined   Stress: Patient Declined (2025)    [employer] [place] [employer] - Occupational Stress Questionnaire     Feeling of Stress : Patient declined   Housing Stability: Patient Declined (2025)    Housing Stability Vital Sign     Unable to Pay for Housing in the Last Year: Patient declined     Homeless in the Last Year: Patient declined

## 2025-05-14 NOTE — ASSESSMENT & PLAN NOTE
Advance Care Planning     Date: 05/14/2025    Code Status  In light of the patients advanced and life limiting illness,I engaged the the family and healthcare power of   in a voluntary conversation about the patient's preferences for care  at the very end of life. The patient wishes to have a natural, peaceful death.  Along those lines, the patient does not wish to have CPR or other invasive treatments performed when his heart and/or breathing stops. I communicated to the family and healthcare power of   that a DNR order would remain in place in his medical record to reflect this preference.    [facility]  I engaged the family and healthcare power of   in a voluntary conversation about advance care planning and we specifically addressed what the goals of care would be moving forward, in light of the patient's change in clinical status, specifically advanced heart failure, renal disease, encephalopathy.  We did specifically address the patient's likely prognosis, which is poor.  We explored the patient's values and preferences for future care.  The family and healthcare power of   endorses that what is most important right now is to focus on comfort and QOL     Accordingly, we have decided that the best plan to meet the patient's goals includes enrolling in hospice care and pivot to comfort-focused care    Hospice  I did explain the role for hospice care at this stage of the patient's illness, including its ability to help the patient live with the best quality of life possible.  We will be making a hospice referral.

## 2025-05-14 NOTE — SUBJECTIVE & OBJECTIVE
Interval History: Pt seen today for palliative medicine consult for goals of care discussion.     Past Medical History:   Diagnosis Date    Actinic keratosis     Benign prostatic hyperplasia     Chronic kidney disease     Stage 3     Fibromyositis     Gouty arthropathy     Hyperlipidemia     Hypertension     Osteoarthritis of knee        Past Surgical History:   Procedure Laterality Date    APPENDECTOMY      cataractsx2  02/21/2011    COLONOSCOPY      COLONOSCOPY N/A 08/01/2022    Procedure: COLONOSCOPY;  Surgeon: Edgar May MD;  Location: [facility];  Service: Endoscopy;  Laterality: N/A;    ESOPHAGOGASTRODUODENOSCOPY N/A 08/01/2022    Procedure: EGD (ESOPHAGOGASTRODUODENOSCOPY);  Surgeon: Edgar May MD;  Location: [facility];  Service: Endoscopy;  Laterality: N/A;    ESOPHAGOGASTRODUODENOSCOPY N/A 9/12/2022    Procedure: EGD (ESOPHAGOGASTRODUODENOSCOPY);  Surgeon: Edgar May MD;  Location: [facility];  Service: Endoscopy;  Laterality: N/A;    ESOPHAGOGASTRODUODENOSCOPY N/A 3/26/2025    Procedure: EGD (ESOPHAGOGASTRODUODENOSCOPY);  Surgeon: Roxana Stuart MD;  Location: [facility];  Service: Endoscopy;  Laterality: N/A;    UPPER GASTROINTESTINAL ENDOSCOPY         Review of patient's allergies indicates:   Allergen Reactions    Pcn [penicillins]        Medications:  Continuous Infusions:  Scheduled Meds:   allopurinoL  300 mg Oral Nightly    apixaban  2.5 mg Oral BID    cefTRIAXone (Rocephin) IV (PEDS and ADULTS)  2 g Intravenous Q24H    cholestyramine-aspartame  1 packet Oral BID    docusate sodium  100 mg Oral QAM    [START ON 5/15/2025] furosemide  80 mg Oral Daily    latanoprost  1 drop Both Eyes QHS    LIDOcaine  1 patch Transdermal Q24H    pantoprazole  40 mg Oral Daily    tamsulosin  1 capsule Oral Nightly     PRN Meds:  Current Facility-Administered Medications:     acetaminophen, 650 mg, Oral, Q6H PRN    bisacodyL, 10 mg, Rectal, Daily PRN    melatonin, 6 mg, Oral, Nightly PRN     promethazine, 12.5 mg, Oral, Q6H PRN    sodium chloride 0.9%, 10 mL, Intravenous, PRN    Family History       Problem Relation (Age of Onset)    Coronary artery disease Mother    Hypertension Mother          Tobacco Use    Smoking status: Former     Current packs/day: 0.00     Types: Cigarettes     Quit date:      Years since quittin.4    Smokeless tobacco: Former     Types: Chew   Substance and Sexual Activity    Alcohol use: Yes     Comment: occasionally    Drug use: Never    Sexual activity: Not Currently       Review of Systems   Unable to perform ROS: Patient unresponsive     Objective:     Vital Signs (Most Recent):  Temp: 98 °F (36.7 °C) (25 112)  Pulse: 95 (25 112)  Resp: 20 (25)  BP: 124/80 (25)  SpO2: 95 % (25) Vital Signs (24h Range):  Temp:  [98 °F (36.7 °C)-98.3 °F (36.8 °C)] 98 °F (36.7 °C)  Pulse:  [] 95  Resp:  [20] 20  SpO2:  [94 %-98 %] 95 %  BP: (102-124)/(58-80) 124/80     Weight: 84.8 kg (186 lb 15.2 oz)  Body mass index is 30.17 kg/m².       Physical Exam  Vitals and nursing note reviewed.   Constitutional:       Comments: Somnolent, min arousable   HENT:      Nose: Nose normal.      Mouth/Throat:      Mouth: Mucous membranes are moist.      Pharynx: Oropharynx is clear.   Eyes:      General: No scleral icterus.     Pupils: Pupils are equal, round, and reactive to light.   Cardiovascular:      Rate and Rhythm: Normal rate and regular rhythm.      Pulses: Normal pulses.   Pulmonary:      Effort: Pulmonary effort is normal. No respiratory distress.   Abdominal:      General: There is no distension.      Palpations: Abdomen is soft.   Skin:     General: Skin is warm and dry.   Neurological:      Mental Status: He is disoriented.      Comments: Min responsive, does not open eyes to command. Does not follow commands.             Review of Symptoms      Symptom Assessment (ESAS 0-10 Scale)  Unable to complete assessment due to Patient  "unresponsive         Pain Assessment in Advanced Demential Scale (PAINAD)   Breathing - Independent of vocalization:  0  Negative vocalization:  0  Facial expression:  0  Body language:  0  Consolability:  0  Total:  0    Performance Status:  10    Living Arrangements:  Lives with family and Lives in home    Psychosocial/Cultural:   See Palliative Psychosocial Note: No  **Primary  to Follow**  Palliative Care  Consult: No        Advance Care Planning   Advance Directives:   Living Will: Yes        Copy on chart: Yes    Medical Power of : Yes      Decision Making:  Family answered questions  Goals of Care: The family and healthcare power of   endorses that what is most important right now is to focus on comfort and QOL     Accordingly, we have decided that the best plan to meet the patient's goals includes enrolling in hospice care and pivot to comfort-focused care.           Significant Labs: All pertinent labs within the past 24 hours have been reviewed.  CBC:       Lactic acid:   No results found for: "LACTATE"    Significant Imaging: I have reviewed all pertinent imaging results/findings within the past 24 hours.    "

## 2025-05-14 NOTE — ASSESSMENT & PLAN NOTE
I reviewed the patient's chart and discussed the case with the patient's team.      I examined Vel Banda at bedside.  The patient lacks capacity for complex medical decision-making.  I spoke with pts jagdish Hutton at bedside. Pts daughter Sanjuanita joined us via telephone. I reviewed his POA on file and confirmed they are pts MPOA.    I introduced myself and my role as palliative care NP. They were agreeable to speaking.    We discussed the patient's medical illness, prognosis, and values in detail.  Below is a brief summary of our discussion.     They have a good understanding of pts current medical illnesses. We spoke back and forth about his advanced heart failure, kidney disease, and encephalopathic state.  They have spoken with Nephrology and understand that he is not a good candidate for hemodialysis.    They expressed that pt would not want to continue with further aggressive treatment or remain in the hospital.  Patient has expressed to them that he wants to die at home.  Family want to honor his wishes and proceed with hospice/comfort care.  They are unable to care for him at home but do not want to place patient in a facility.      We discussed prognosis. I let them know that if pt conts on his current trajectory and we proceed with comfort focused care that I would not be surprised if he dies in the coming days. I would be surprised if he survives weeks. They were appropriately emotional but not surprised by this news. I spent some time and attended to their emotions.     Family is struggling with wanting to honer pts wishes to die at home but they do not feel they can provide the care he will need for end of life. We discussed option of inpt hospice. I feel once pt is transitioned to comfort care he will experience symptoms that will likely need to be managed inpt. I anticipate he will benefit from morphine infusion for symptom management. Family is agreeable to eval for inpt hospice. They desire to  proceed with comfort focused care at this time.     I updated pts medical team and CM. They have reached out to [facility] hospice for inpt hospice eval.     I appreciate being involved in pts care. I will remain available until hospice is arranged. Please reach out if I can be of any assistance.

## 2025-05-14 NOTE — PLAN OF CARE
MANINDER faxed the referral to [facility] at [phone]. [facility] will deliver and set up equipment to the home. Patient will need an ambulance transport home.      05/14/25 1254   Post-Acute Status   Post-Acute Authorization Hospice   Hospice Status Referrals Sent   Discharge Delays (!) Home Medical Equipment (Insurance, Delivery)   Discharge Plan   Discharge Plan A Hospice/home   Discharge Plan B Hospice/home

## 2025-05-14 NOTE — PLAN OF CARE
Patient is being discharged home with [facility]. Ambulance transport has been set up.     Patient clear from CM standpoint.      05/14/25 1510   Final Note   Assessment Type Final Discharge Note   Anticipated Discharge Disposition HospiceHome   What phone number can be called within the next 1-3 days to see how you are doing after discharge? [phone]   Post-Acute Status   Post-Acute Authorization Hospice   Hospice Status Set-up Complete/Auth obtained   Discharge Delays (!) Ambulance Transport/Facility Transport

## 2025-05-14 NOTE — CARE UPDATE
05/14/25 0013   Patient Assessment/Suction   Level of Consciousness (AVPU) alert   Respiratory Effort Unlabored   Expansion/Accessory Muscles/Retractions no use of accessory muscles   All Lung Fields Breath Sounds crackles   Rhythm/Pattern, Respiratory shallow   Cough Frequency no cough   PRE-TX-O2   Device (Oxygen Therapy) room air   SpO2 97 %   Pulse Oximetry Type Intermittent   Pulse 94   Resp 20

## 2025-05-14 NOTE — CONSULTS
[facility]  Palliative Medicine  Consult Note    Patient Name: Vel Banda  MRN: [id number]  Admission Date: 5/12/2025  Hospital Length of Stay: 1 days  Code Status: DNR   Attending Provider: Jos Gomez MD  Consulting Provider: Aaliyah Campoverde NP  Primary Care Physician: Thalia Felton MD  Principal Problem:Acute on chronic heart failure    Patient information was obtained from relative(s), past medical records, ER records, and primary team.      Consults  Assessment/Plan:     Palliative Care  Goals of care, counseling/discussion  I reviewed the patient's chart and discussed the case with the patient's team.      I examined Vel Banda at bedside.  The patient lacks capacity for complex medical decision-making.  I spoke with pts jagdish Hutton at bedside. Pts daughter Sanjuanita joined us via telephone. I reviewed his POA on file and confirmed they are pts MPOA.    I introduced myself and my role as palliative care NP. They were agreeable to speaking.    We discussed the patient's medical illness, prognosis, and values in detail.  Below is a brief summary of our discussion.     They have a good understanding of pts current medical illnesses. We spoke back and forth about his advanced heart failure, kidney disease, and encephalopathic state.  They have spoken with Nephrology and understand that he is not a good candidate for hemodialysis.    They expressed that pt would not want to continue with further aggressive treatment or remain in the hospital.  Patient has expressed to them that he wants to die at home.  Family want to honor his wishes and proceed with hospice/comfort care.  They are unable to care for him at home but do not want to place patient in a facility.      We discussed prognosis. I let them know that if pt conts on his current trajectory and we proceed with comfort focused care that I would not be surprised if he dies in the coming days. I would be surprised if he survives  weeks. They were appropriately emotional but not surprised by this news. I spent some time and attended to their emotions.     Family is struggling with wanting to honer pts wishes to die at home but they do not feel they can provide the care he will need for end of life. We discussed option of inpt hospice. I feel once pt is transitioned to comfort care he will experience symptoms that will likely need to be managed inpt. I anticipate he will benefit from morphine infusion for symptom management. Family is agreeable to eval for inpt hospice. They desire to proceed with comfort focused care at this time.     I updated pts medical team and CM. They have reached out to [facility] hospice for inpt hospice eval.     I appreciate being involved in pts care. I will remain available until hospice is arranged. Please reach out if I can be of any assistance.         ACP (advance care planning)  Advance Care Planning    Date: 05/14/2025    Code Status  In light of the patients advanced and life limiting illness,I engaged the the family and healthcare power of   in a voluntary conversation about the patient's preferences for care  at the very end of life. The patient wishes to have a natural, peaceful death.  Along those lines, the patient does not wish to have CPR or other invasive treatments performed when his heart and/or breathing stops. I communicated to the family and healthcare power of   that a DNR order would remain in place in his medical record to reflect this preference.    [facility]  I engaged the family and healthcare power of   in a voluntary conversation about advance care planning and we specifically addressed what the goals of care would be moving forward, in light of the patient's change in clinical status, specifically advanced heart failure, renal disease, encephalopathy.  We did specifically address the patient's likely prognosis, which is poor.  We explored the patient's values and  preferences for future care.  The family and healthcare power of   endorses that what is most important right now is to focus on comfort and QOL     Accordingly, we have decided that the best plan to meet the patient's goals includes enrolling in hospice care and pivot to comfort-focused care    Hospice  I did explain the role for hospice care at this stage of the patient's illness, including its ability to help the patient live with the best quality of life possible.  We will be making a hospice referral.              Thank you for your consult.      Subjective:     HPI:   From admission HPI:  89-year-old male presented to ED from Cardiology office for eval of fluid overload.  pMHx AF, HF, CKD, aortic valve stenosis, HLD, LBBB, anemia, gout, BPH.  Patient evaluated by Cardiology outpatient earlier today for shortness of breath and lower extremity edema that has been progressively worsening since March of this year, patient subsequently advised to go to ED for diuresis and Cardiology follow-up.  Patient has history of HFpEF, echo 3/25/2025 with EF 50-55%, on Lasix outpatient, patient's grandson at bedside reports compliance with Lasix, states patient is still not diuresing well even if given extra dose of p.o. Lasix.  Patient also with history of AF, on Eliquis outpatient.  It is reported patient has been reluctant to continue attending outpatient doctor visits, discussed hospice consult on exam, patient family are agreeable to hospice consult but would like to continue with medical treatment at present and in the interim.  In ED today, BNP 1745.  Initial troponin 72.  UA with UTI. CXR impression with hypoinflation with cardiomegaly and small pleural effusions; prominence of the pulmonary vasculature suggestive of edema. BLE venous [place] impression with no evidence of DVT.  Per Cardiology recs patient given Lasix x1 IVP and gtt started admit to hospital medicine for further eval.       Palliative medicine  consult:  Pt admitted for CHF exac and UTI. Pt is not responding well to IV diuresing. He has been eval by nephrology and deemed not a good candidate for HD. Family considering hospice. We have been consulted for goals of care discussion.     Hospital Course:  No notes on file    Interval History: Pt seen today for palliative medicine consult for goals of care discussion.     Past Medical History:   Diagnosis Date    Actinic keratosis     Benign prostatic hyperplasia     Chronic kidney disease     Stage 3     Fibromyositis     Gouty arthropathy     Hyperlipidemia     Hypertension     Osteoarthritis of knee        Past Surgical History:   Procedure Laterality Date    APPENDECTOMY      cataractsx2  02/21/2011    COLONOSCOPY      COLONOSCOPY N/A 08/01/2022    Procedure: COLONOSCOPY;  Surgeon: Edgar May MD;  Location: [facility];  Service: Endoscopy;  Laterality: N/A;    ESOPHAGOGASTRODUODENOSCOPY N/A 08/01/2022    Procedure: EGD (ESOPHAGOGASTRODUODENOSCOPY);  Surgeon: Edgar May MD;  Location: [facility];  Service: Endoscopy;  Laterality: N/A;    ESOPHAGOGASTRODUODENOSCOPY N/A 9/12/2022    Procedure: EGD (ESOPHAGOGASTRODUODENOSCOPY);  Surgeon: Edgar May MD;  Location: [facility];  Service: Endoscopy;  Laterality: N/A;    ESOPHAGOGASTRODUODENOSCOPY N/A 3/26/2025    Procedure: EGD (ESOPHAGOGASTRODUODENOSCOPY);  Surgeon: Roxana Stuart MD;  Location: [facility];  Service: Endoscopy;  Laterality: N/A;    UPPER GASTROINTESTINAL ENDOSCOPY         Review of patient's allergies indicates:   Allergen Reactions    Pcn [penicillins]        Medications:  Continuous Infusions:  Scheduled Meds:   allopurinoL  300 mg Oral Nightly    apixaban  2.5 mg Oral BID    cefTRIAXone (Rocephin) IV (PEDS and ADULTS)  2 g Intravenous Q24H    cholestyramine-aspartame  1 packet Oral BID    docusate sodium  100 mg Oral QAM    [START ON 5/15/2025] furosemide  80 mg Oral Daily    latanoprost  1 drop Both Eyes QHS    LIDOcaine   1 patch Transdermal Q24H    pantoprazole  40 mg Oral Daily    tamsulosin  1 capsule Oral Nightly     PRN Meds:  Current Facility-Administered Medications:     acetaminophen, 650 mg, Oral, Q6H PRN    bisacodyL, 10 mg, Rectal, Daily PRN    melatonin, 6 mg, Oral, Nightly PRN    promethazine, 12.5 mg, Oral, Q6H PRN    sodium chloride 0.9%, 10 mL, Intravenous, PRN    Family History       Problem Relation (Age of Onset)    Coronary artery disease Mother    Hypertension Mother          Tobacco Use    Smoking status: Former     Current packs/day: 0.00     Types: Cigarettes     Quit date:      Years since quittin.4    Smokeless tobacco: Former     Types: Chew   Substance and Sexual Activity    Alcohol use: Yes     Comment: occasionally    Drug use: Never    Sexual activity: Not Currently       Review of Systems   Unable to perform ROS: Patient unresponsive     Objective:     Vital Signs (Most Recent):  Temp: 98 °F (36.7 °C) (25 112)  Pulse: 95 (25 112)  Resp: 20 (25)  BP: 124/80 (25)  SpO2: 95 % (25) Vital Signs (24h Range):  Temp:  [98 °F (36.7 °C)-98.3 °F (36.8 °C)] 98 °F (36.7 °C)  Pulse:  [] 95  Resp:  [20] 20  SpO2:  [94 %-98 %] 95 %  BP: (102-124)/(58-80) 124/80     Weight: 84.8 kg (186 lb 15.2 oz)  Body mass index is 30.17 kg/m².       Physical Exam  Vitals and nursing note reviewed.   Constitutional:       Comments: Somnolent, min arousable   HENT:      Nose: Nose normal.      Mouth/Throat:      Mouth: Mucous membranes are moist.      Pharynx: Oropharynx is clear.   Eyes:      General: No scleral icterus.     Pupils: Pupils are equal, round, and reactive to light.   Cardiovascular:      Rate and Rhythm: Normal rate and regular rhythm.      Pulses: Normal pulses.   Pulmonary:      Effort: Pulmonary effort is normal. No respiratory distress.   Abdominal:      General: There is no distension.      Palpations: Abdomen is soft.   Skin:     General: Skin is  "warm and dry.   Neurological:      Mental Status: He is disoriented.      Comments: Min responsive, does not open eyes to command. Does not follow commands.             Review of Symptoms      Symptom Assessment (ESAS 0-10 Scale)  Unable to complete assessment due to Patient unresponsive         Pain Assessment in Advanced Demential Scale (PAINAD)   Breathing - Independent of vocalization:  0  Negative vocalization:  0  Facial expression:  0  Body language:  0  Consolability:  0  Total:  0    Performance Status:  10    Living Arrangements:  Lives with family and Lives in home    Psychosocial/Cultural:   See Palliative Psychosocial Note: No  **Primary  to Follow**  Palliative Care  Consult: No        Advance Care Planning  Advance Directives:   Living Will: Yes        Copy on chart: Yes    Medical Power of : Yes      Decision Making:  Family answered questions  Goals of Care: The family and healthcare power of   endorses that what is most important right now is to focus on comfort and QOL     Accordingly, we have decided that the best plan to meet the patient's goals includes enrolling in hospice care and pivot to comfort-focused care.           Significant Labs: All pertinent labs within the past 24 hours have been reviewed.  CBC:       Lactic acid:   No results found for: "LACTATE"    Significant Imaging: I have reviewed all pertinent imaging results/findings within the past 24 hours.      I spent a total of 75 minutes on the day of the visit. This includes face to face time in discussion of goals of care, symptom assessment, coordination of care and emotional support.  This also includes non-face to face time preparing to see the patient (eg, review of tests/imaging), obtaining and/or reviewing separately obtained history, documenting clinical information in the electronic or other health record, independently interpreting results and communicating results to the " patient/family/caregiver, or care coordinator.    I spent an additional 18 minutes discussing advance care planning.     Aaliyah Campoverde NP  Palliative Medicine  [facility]

## 2025-05-14 NOTE — CONSULTS
[facility]  Department of Cardiology  Consult Note      PATIENT NAME: Vel Banda    MRN: [id number]  TODAY'S DATE: 05/14/2025  ADMIT DATE: 5/12/2025                          CONSULT REQUESTED BY: Jos Gomez MD    SUBJECTIVE     PRINCIPAL PROBLEM: Acute on chronic heart failure    05/14/2025  Patient seen resting in bed. He is more awake and states he is feeling better. He is discharging home with hospice today.     HPI:    Patient is a 89-year-old male who presented to the emergency room per recommendation of Dr. Jules due to fluid volume overload.  He has a known history of heart failure with preserved ejection fraction, moderate aortic stenosis, chronic AFib, CKD, and left bundle branch block.  Patient also has history of anemia, BPH, and gout.  Patient seen resting in bed with no acute distress noted.  Significant lower extremity edema noted.  Patient has a PureWick in place which does have a leak.  Noted to have a wet brief.      REASON FOR CONSULT:  From Hospitalist H&P:   HPI: 89-year-old male presented to ED from Cardiology office for eval of fluid overload.  pMHx AF, HF, CKD, aortic valve stenosis, HLD, LBBB, anemia, gout, BPH.  Patient evaluated by Cardiology outpatient earlier today for shortness of breath and lower extremity edema that has been progressively worsening since March of this year, patient subsequently advised to go to ED for diuresis and Cardiology follow-up.  Patient has history of HFpEF, echo 3/25/2025 with EF 50-55%, on Lasix outpatient, patient's grandson at bedside reports compliance with Lasix, states patient is still not diuresing well even if given extra dose of p.o. Lasix.  Patient also with history of AF, on Eliquis outpatient.  It is reported patient has been reluctant to continue attending outpatient doctor visits, discussed hospice consult on exam, patient family are agreeable to hospice consult but would like to continue with medical treatment at present  and in the interim.  In ED today, BNP 1745.  Initial troponin 72.  UA with UTI. CXR impression with hypoinflation with cardiomegaly and small pleural effusions; prominence of the pulmonary vasculature suggestive of edema. BLE venous [place] impression with no evidence of DVT.  Per Cardiology recs patient given Lasix x1 IVP and gtt started admit to hospital medicine for further eval.       Review of patient's allergies indicates:   Allergen Reactions    Pcn [penicillins]        Past Medical History:   Diagnosis Date    Actinic keratosis     Benign prostatic hyperplasia     Chronic kidney disease     Stage 3     Fibromyositis     Gouty arthropathy     Hyperlipidemia     Hypertension     Osteoarthritis of knee      Past Surgical History:   Procedure Laterality Date    APPENDECTOMY      [email or web address]  02/21/2011    COLONOSCOPY      COLONOSCOPY N/A 08/01/2022    Procedure: COLONOSCOPY;  Surgeon: Edgar May MD;  Location: [facility];  Service: Endoscopy;  Laterality: N/A;    ESOPHAGOGASTRODUODENOSCOPY N/A 08/01/2022    Procedure: EGD (ESOPHAGOGASTRODUODENOSCOPY);  Surgeon: Edgar May MD;  Location: [facility];  Service: Endoscopy;  Laterality: N/A;    ESOPHAGOGASTRODUODENOSCOPY N/A 9/12/2022    Procedure: EGD (ESOPHAGOGASTRODUODENOSCOPY);  Surgeon: Edgar May MD;  Location: [facility];  Service: Endoscopy;  Laterality: N/A;    ESOPHAGOGASTRODUODENOSCOPY N/A 3/26/2025    Procedure: EGD (ESOPHAGOGASTRODUODENOSCOPY);  Surgeon: Roxana Stuart MD;  Location: [facility];  Service: Endoscopy;  Laterality: N/A;    UPPER GASTROINTESTINAL ENDOSCOPY       Social History[1]     REVIEW OF SYSTEMS  Per HPI    OBJECTIVE     VITAL SIGNS (Most Recent)  Temp: 98 °F (36.7 °C) (05/14/25 1129)  Pulse: 95 (05/14/25 1129)  Resp: 20 (05/14/25 1129)  BP: 124/80 (05/14/25 1129)  SpO2: 95 % (05/14/25 1129)    VENTILATION STATUS  Resp: 20 (05/14/25 1129)  SpO2: 95 % (05/14/25 1129)           I & O (Last 24H):  Intake/Output  Summary (Last 24 hours) at 5/14/2025 1511  Last data filed at 5/14/2025 0905  Gross per 24 hour   Intake 680 ml   Output 3400 ml   Net -2720 ml       WEIGHTS  Wt Readings from Last 1 Encounters:   05/14/25 1129 84.8 kg (186 lb 15.2 oz)   05/12/25 1242 84.8 kg (187 lb)       PHYSICAL EXAM  CONSTITUTIONAL: NAD  HEENT: Normocephalic,          NECK:  No JVD   CVS: S1S2+, RRR  LUNGS: basilar crackles   ABDOMEN: Soft, NT, BS+  EXTREMITIES: 1-2+ LE edema  : No dennis catheter  NEURO: AAO X 3  PSY: Normal affect      HOME MEDICATIONS:Medications Ordered Prior to Encounter[2]    SCHEDULED MEDS:   allopurinoL  300 mg Oral Nightly    apixaban  2.5 mg Oral BID    cefTRIAXone (Rocephin) IV (PEDS and ADULTS)  2 g Intravenous Q24H    cholestyramine-aspartame  1 packet Oral BID    docusate sodium  100 mg Oral QAM    [START ON 5/15/2025] furosemide  80 mg Oral Daily    latanoprost  1 drop Both Eyes QHS    LIDOcaine  1 patch Transdermal Q24H    pantoprazole  40 mg Oral Daily    tamsulosin  1 capsule Oral Nightly       CONTINUOUS INFUSIONS:        PRN MEDS:  Current Facility-Administered Medications:     acetaminophen, 650 mg, Oral, Q6H PRN    bisacodyL, 10 mg, Rectal, Daily PRN    melatonin, 6 mg, Oral, Nightly PRN    promethazine, 12.5 mg, Oral, Q6H PRN    sodium chloride 0.9%, 10 mL, Intravenous, PRN    LABS AND DIAGNOSTICS     CBC LAST 3 DAYS  Recent Labs   Lab 05/12/25  1424 05/13/25  0719 05/14/25  0708   WBC 7.91 6.84 6.69   RBC 4.14* 3.68* 3.75*   HGB 8.9* 8.0* 8.1*   HCT 32.2* 27.8* 28.3*   MCV 78* 76* 76*   MCH 21.5* 21.7* 21.6*   MCHC 27.6* 28.8* 28.6*   RDW 21.5* 21.2* 21.1*    228 212   MPV 10.1 10.5 10.1   NRBC 1* 1* 0       COAGULATION LAST 3 DAYS  Recent Labs   Lab 05/12/25  1859 05/13/25  0719 05/14/25  0708   INR  --  1.3* 1.2   APTT 27.7  --   --        CHEMISTRY LAST 3 DAYS  Recent Labs   Lab 05/12/25  1424 05/13/25  0719 05/14/25  0708    141 140   K 4.2 3.9 3.2*    104 99   CO2 23 25 29  "  ANIONGAP 11 12 12   BUN 64* 61* 58*   CREATININE 2.2* 2.1* 2.1*   GLU 90 85 87   CALCIUM 9.1 8.7 8.7   MG 2.2  --   --    ALBUMIN 3.6 3.1* 3.0*   PROT 6.6 5.9* 5.9*   ALKPHOS 91 77 76   ALT 45* 34 26   AST 43* 27 19   BILITOT 1.1* 0.7 0.8       CARDIAC PROFILE LAST 3 DAYS  Recent Labs   Lab 05/12/25  1424   BNP 1,745*       ENDOCRINE LAST 3 DAYS  Recent Labs   Lab 05/13/25  0719   TSH 1.345       LAST ARTERIAL BLOOD GAS  ABG  No results for input(s): "PH", "PO2", "PCO2", "HCO3", "BE" in the last 168 hours.    LAST 7 DAYS MICROBIOLOGY   Microbiology Results (last 7 days)       Procedure Component Value Units Date/Time    Urine culture [[id number]]  (Abnormal)  (Susceptibility) Collected: 05/12/25 1424    Order Status: Completed Specimen: Urine Updated: 05/14/25 0639     Urine Culture >100,000 cfu/ml Escherichia coli            MOST RECENT IMAGING  X-Ray Chest AP Portable  Narrative: EXAMINATION:  XR CHEST AP PORTABLE    CLINICAL HISTORY:  CHF;    FINDINGS:  Portable chest at 14:26 hours is compared to 04/21/2025 shows cardiomegaly.  There is hypoinflation.    There are small bilateral pleural effusions with bibasilar airspace disease.  There is prominence of the central pulmonary vasculature suggestive of edema.  No acute osseous abnormality.  Impression: Hypoinflation with cardiomegaly and small pleural effusions    Prominence of the pulmonary vasculature suggestive of edema.    Electronically signed by: Marlen Cunningham  Date:    05/12/2025  Time:    14:41  US Lower Extremity Veins Bilateral  Narrative: EXAMINATION:  US LOWER EXTREMITY VEINS BILATERAL    CLINICAL HISTORY:  Other specified soft tissue disorders    COMPARISON:  None    FINDINGS:  Grayscale, color Doppler, and spectral Doppler analysis was performed.    Bilateral common femoral, femoral, popliteal, and proximal great saphenous veins show normal compressibility, augmentation, and color Doppler flow. Bilateral posterior tibial, anterior tibial, and " peroneal veins are unremarkable.  Impression: No evidence of bilateral lower extremity DVT    Electronically signed by: Marlen Cunningham  Date:    05/12/2025  Time:    13:59      ECHOCARDIOGRAM RESULTS (last 5)  Results for orders placed during the hospital encounter of 03/25/25    Echo    Interpretation Summary    Left Ventricle: Left ventricle was not well visualized due to poor sonic window. The left ventricle is at the upper limits of normal in size. Increased wall thickness. There is concentric hypertrophy. Septal motion is consistent with bundle branch block. There is low normal systolic function with a visually estimated ejection fraction of 50 - 55%. Unable to assess diastolic function due to atrial fibrillation.    Right Ventricle: The right ventricle is normal in size. Systolic function is normal.    Aortic Valve: The aortic valve is a trileaflet valve. There is severe aortic valve sclerosis. Moderately calcified right cusp. Moderately restricted motion. There is mild stenosis. Aortic valve area by VTI is 1.2 cm². Aortic valve area by velocity is 1.1 cm². Aortic valve peak velocity is 2.4 m/s. Mean gradient is 13 mmHg. Aortic Valve peak gradient is 23 mmHg. The dimensionless index is 0.48. There is mild aortic regurgitation.    Mitral Valve: There is mild to moderate regurgitation.    Tricuspid Valve: There is moderate regurgitation. There is mild pulmonary hypertension. The estimated PA systolic pressure is at least 44 mmHg.      CURRENT/PREVIOUS VISIT EKG  Results for orders placed or performed during the hospital encounter of 05/12/25   EKG 12-lead    Collection Time: 05/12/25 12:34 PM   Result Value Ref Range    QRS Duration 180 ms    [facility] QTC Calculation 523 ms    Narrative    Test Reason : R94.31,    Vent. Rate : 101 BPM     Atrial Rate :    BPM     P-R Int :    ms          QRS Dur : 180 ms      QT Int : 404 ms       P-R-T Axes :     21 180 degrees    QTcB Int : 523 ms    Atrial fibrillation with rapid  ventricular response with premature  ventricular or aberrantly conducted complexes  Left bundle branch block  Abnormal ECG  When compared with ECG of 12-May-2025 11:24,  QT has lengthened    Referred By: AAAREFERRAL SELF           Confirmed By:            ASSESSMENT/PLAN:     Active Hospital Problems    Diagnosis    *Acute on chronic heart failure    Goals of care, counseling/discussion    Atrial fibrillation    LBBB (left bundle branch block)    UTI (urinary tract infection)    Prolonged QT interval    Advanced care planning/counseling discussion    ACP (advance care planning)    Iron deficiency anemia, unspecified    Stage 3 chronic kidney disease       ASSESSMENT & PLAN:     Acute on chronic HFpEF  Afib   Moderate AS  UTI  CKD  LBBB      RECOMMENDATIONS:    He has been switched to oral diuresis which can be managed and followed by hospice for comfort.   Nurse states he is dc'ing with hospice today.     Adela Liao NP  Date of Service: 2025  8:53 AM      I have personally interviewed and examined the patient, I have reviewed the Nurse Practitioner's history and physical, assessment, and plan. I have personally evaluated the patient at bedside and agree with the findings and made appropriate changes as necessary in recommendations.  All pertinent recent labs, imaging and EKGs independently reviewed and interpreted.     Brittaney Escalante MD [facility]  Department of Cardiology  [facility]  2025           [1]   Social History  Tobacco Use    Smoking status: Former     Current packs/day: 0.00     Types: Cigarettes     Quit date: 1975     Years since quittin.4    Smokeless tobacco: Former     Types: Chew   Substance Use Topics    Alcohol use: Yes     Comment: occasionally    Drug use: Never   [2]   No current facility-administered medications on file prior to encounter.     Current Outpatient Medications on File Prior to Encounter   Medication Sig Dispense Refill    antiox.joy  no.10/omeg3s/lut/castro (I-CAPS ORAL) Take 1 tablet by mouth every evening.      apixaban (ELIQUIS) 2.5 mg Tab Take 1 tablet (2.5 mg total) by mouth 2 (two) times daily. 180 tablet 1    cholestyramine, with sugar, 4 gram Powd Take 1 Scoop by mouth once daily. (Patient taking differently: Take 1 Scoop by mouth every morning.) 3 each 3    docusate sodium (COLACE) 100 MG capsule Take 100 mg by mouth every morning.      latanoprost 0.005 % ophthalmic solution Place 1 drop into both eyes every evening.      pantoprazole (PROTONIX) 40 MG tablet Take 1 tablet (40 mg total) by mouth once daily. 90 tablet 3    polyethylene glycol (GLYCOLAX) 17 gram PwPk Take 17 g by mouth once daily. 100 packet 3    tamsulosin (FLOMAX) 0.4 mg Cap TAKE 1 CAPSULE BY MOUTH EVERY DAY 90 capsule 0    [DISCONTINUED] allopurinoL (ZYLOPRIM) 300 MG tablet TAKE 1 TABLET BY MOUTH EVERY DAY (Patient taking differently: Take 300 mg by mouth nightly.) 90 tablet 0    [DISCONTINUED] furosemide (LASIX) 20 MG tablet Take 1 tablet (20 mg total) by mouth once daily. (Patient taking differently: Take 40 mg by mouth once daily.) 90 tablet 1    [DISCONTINUED] furosemide (LASIX) 20 MG tablet Take one of these addl Lasix 20mg tablets with your regularly scheduled Lasix tabs daily for 10 days, then return to Lasix 20mg daily. 10 tablet 0

## 2025-05-15 NOTE — DISCHARGE SUMMARY
[facility] Medicine  Discharge Summary      Patient Name: Vel Banda  MRN: [id number]  VERÓNICA: [id number]  Patient Class: IP- Inpatient  Admission Date: 5/12/2025  Hospital Length of Stay: 1 days  Discharge Date and Time: 5/14/2025  6:41 PM  Attending Physician: No att. providers found   Discharging Provider: Jos Gomez MD  Primary Care Provider: Thalia Felton MD    Primary Care Team: Networked reference to record PCT     HPI:   89-year-old male presented to ED from Cardiology office for eval of fluid overload.  pMHx AF, HF, CKD, aortic valve stenosis, HLD, LBBB, anemia, gout, BPH.  Patient evaluated by Cardiology outpatient earlier today for shortness of breath and lower extremity edema that has been progressively worsening since March of this year, patient subsequently advised to go to ED for diuresis and Cardiology follow-up.  Patient has history of HFpEF, echo 3/25/2025 with EF 50-55%, on Lasix outpatient, patient's grandson at bedside reports compliance with Lasix, states patient is still not diuresing well even if given extra dose of p.o. Lasix.  Patient also with history of AF, on Eliquis outpatient.  It is reported patient has been reluctant to continue attending outpatient doctor visits, discussed hospice consult on exam, patient family are agreeable to hospice consult but would like to continue with medical treatment at present and in the interim.  In ED today, BNP 1745.  Initial troponin 72.  UA with UTI. CXR impression with hypoinflation with cardiomegaly and small pleural effusions; prominence of the pulmonary vasculature suggestive of edema. BLE venous [place] impression with no evidence of DVT.  Per Cardiology recs patient given Lasix x1 IVP and gtt started admit to hospital medicine for further eval.    * No surgery found *      Hospital Course:   89-year-old male admitted for possible CHF exacerbation.  He was started on Lasix drip for diuresis.  Also found to have UTI and  was started on antibiotics.  Urine culture closely followed and growing Gram-negative reji.  Cardiology and Nephrology were consulted.  Patient's healthcare proxy, patient's grandson at bedside mentioned they would want to consider hospice care, case management and  notified, spacer referrals sent.  Palliative care consulted, appreciate recommendations.  Eventually patient and the family decided go home with hospice.   Discontinue IV Lasix drip, patient has excellent urine output , we will start on p.o. Lasix 80 mg once a day upon discharge due to significant CKD.     Stable discharged home with hospice     Goals of Care Treatment Preferences:  Code Status: DNR    Health care agent: Brennen Edmondson / Sanjuanita Whitley  Health care agent number:  /     Living Will: Yes     What is most important right now is to focus on comfort and QOL .  Accordingly, we have decided that the best plan to meet the patient's goals includes enrolling in hospice care, pivot to comfort-focused care.      SDOH Screening:  The patient declined to be screened for utility difficulties, food insecurity, transport difficulties, housing insecurity, and interpersonal safety, so no concerns could be identified this admission.     Consults:   Consults (From admission, onward)          Status Ordering Provider     Inpatient consult to Social Work/Case Management  Once        Provider:  (Not yet assigned)    Completed SERENA STOUT     Inpatient consult to Nephrology  Once        Provider:  Rolo Beaulieu MD    Completed LIN WARNER     Inpatient consult to Cardiology  Once        Provider:  Mike Jules MD    Completed LIN WARNER     Inpatient consult to Social Work/Case Management  Once        Provider:  (Not yet assigned)    LIN Barillas     Inpatient consult to Registered Dietitian/Nutritionist  Once        Provider:  (Not yet assigned)    LIN Barillas             Assessment & Plan  Acute on chronic heart failure  Patient is identified as having Diastolic (HFpEF) heart failure that is Acute on Chronic. CHF is currently uncontrolled due to volume overload due to: Continued edema of extremities, Rales/crackles on pulmonary exam, and Pulmonary edema/pleural effusion on CXR. Latest ECHO performed and demonstrates- Results for orders placed during the hospital encounter of 03/25/25    Echo    Interpretation Summary    Left Ventricle: Left ventricle was not well visualized due to poor sonic window. The left ventricle is at the upper limits of normal in size. Increased wall thickness. There is concentric hypertrophy. Septal motion is consistent with bundle branch block. There is low normal systolic function with a visually estimated ejection fraction of 50 - 55%. Unable to assess diastolic function due to atrial fibrillation.    Right Ventricle: The right ventricle is normal in size. Systolic function is normal.    Aortic Valve: The aortic valve is a trileaflet valve. There is severe aortic valve sclerosis. Moderately calcified right cusp. Moderately restricted motion. There is mild stenosis. Aortic valve area by VTI is 1.2 cm². Aortic valve area by velocity is 1.1 cm². Aortic valve peak velocity is 2.4 m/s. Mean gradient is 13 mmHg. Aortic Valve peak gradient is 23 mmHg. The dimensionless index is 0.48. There is mild aortic regurgitation.    Mitral Valve: There is mild to moderate regurgitation.    Tricuspid Valve: There is moderate regurgitation. There is mild pulmonary hypertension. The estimated PA systolic pressure is at least 44 mmHg.      . Continue Furosemide drip and monitor clinical status closely. Monitor on telemetry. Patient is on CHF pathway.  Monitor strict Is&Os and daily weights.  Place on fluid restriction of 1.5 L. Cardiology is consulted. Continue to stress to patient importance of self efficacy and  on diet for CHF. Last BNP reviewed- and noted  below   Recent Labs   Lab 05/12/25  1424   BNP 1,745*   .  Not a dialysis candidate  Family pursuing hospice care       Stage 3 chronic kidney disease  Creatine stable for now. CMP reviewed- noted Estimated Creatinine Clearance: 20.1 mL/min (A) (based on SCr of 2.1 mg/dL (H)). according to latest data. Based on current GFR, CKD stage is stage 3 - GFR 30-59.  Monitor UOP and serial CMP and adjust therapy as needed. Renally dose meds. Avoid nephrotoxic medications and procedures.    Family pursuing hospice care  Iron deficiency anemia, unspecified  Anemia is likely due to Iron deficiency and chronic disease due to Chronic Kidney Disease. Most recent hemoglobin and hematocrit are listed below.  Recent Labs     05/12/25  1424 05/13/25  0719 05/14/25  0708   HGB 8.9* 8.0* 8.1*   HCT 32.2* 27.8* 28.3*     Plan  - Monitor serial CBC: Daily  - Transfuse PRBC if patient becomes hemodynamically unstable, symptomatic or H/H drops below 7/21.  - Patient has received  PRBCs on in the past  - Patient's anemia is currently stable    Atrial fibrillation  Patient has persistent (7 days or more) atrial fibrillation. Patient is currently in atrial fibrillation. [id number] Score: 2. The patients heart rate in the last 24 hours is as follows:  Pulse  Min: 103  Max: 103     Antiarrhythmics  metoprolol succinate (TOPROL-XL) 24 hr tablet, Daily, Oral    Anticoagulants       Plan  - Replete lytes with a goal of K>4, Mg >2  - Patient is anticoagulated, see medications listed above.  - Patient's afib is currently controlled  - Monitor coags      LBBB (left bundle branch block)  Noted on EKG 3/25/25 and noted in cardiac hx  EKG PRN  Tele monitoring    UTI (urinary tract infection)  Urine cx growing Gram-negative reji  Rocephin  Bladder scan for PVR  I&O  Prolonged QT interval  Avoid qt prolonging meds  Tele monitoring  Repeat EKG in a.m.    Advanced care planning/counseling discussion  I spent 25 minutes of face to face discussion regarding  advance directives and end of life planning which included patient and family. Patient/Family understands the seriousness of their condition and would like to make their end of life decisions known as follows- DNR, would like to pursue hospice, under leak into different options, and wants to continue with current treatment without any escalation  ACP (advance care planning)      Goals of care, counseling/discussion  Advance Care Planning   Hospice       Final Active Diagnoses:    Diagnosis Date Noted POA    PRINCIPAL PROBLEM:  Acute on chronic heart failure [I50.9] 05/12/2025 Yes    Goals of care, counseling/discussion [Z71.89] 05/14/2025 Not Applicable    Atrial fibrillation [I48.91] 05/12/2025 Yes    LBBB (left bundle branch block) [I44.7] 05/12/2025 Yes    UTI (urinary tract infection) [N39.0] 05/12/2025 Yes    Prolonged QT interval [R94.31] 05/12/2025 Yes    Advanced care planning/counseling discussion [Z71.89] 05/12/2025 Not Applicable    ACP (advance care planning) [Z71.89] 05/12/2025 Not Applicable    Iron deficiency anemia, unspecified [D50.9] 11/10/2022 Yes    Stage 3 chronic kidney disease [N18.30] 11/08/2018 Yes      Problems Resolved During this Admission:       Discharged Condition: fair    Disposition: Hospice/Home    Follow Up:   Contact information for follow-up providers       Thalia Felton MD. Schedule an appointment as soon as possible for a visit in 1 week(s).    Specialty: Internal Medicine  Contact information:  [place] [address] [place] [address] [phone]                       Contact information for after-discharge care       [facility]       [facility]    Service: Home Hospice  Contact information:    [address], [address]       [place] [facility] [address] [phone]                                 Patient Instructions:   No discharge procedures on file.    Significant Diagnostic Studies: Labs: CMP   Recent Labs   Lab 05/14/25  0708      K 3.2*   CL 99   CO2  29   GLU 87   BUN 58*   CREATININE 2.1*   CALCIUM 8.7   PROT 5.9*   ALBUMIN 3.0*   BILITOT 0.8   ALKPHOS 76   AST 19   ALT 26   ANIONGAP 12    and CBC   Recent Labs   Lab 05/14/25  0708   WBC 6.69   HGB 8.1*   HCT 28.3*          Pending Diagnostic Studies:       Procedure Component Value Units Date/Time    EKG 12-lead [[id number]] Collected: 05/12/25 1234    Order Status: Sent Lab Status: In process Updated: 05/14/25 0550     QRS Duration 180 ms      OHS QTC Calculation 523 ms     Narrative:      Test Reason : R94.31,    Vent. Rate : 101 BPM     Atrial Rate :    BPM     P-R Int :    ms          QRS Dur : 180 ms      QT Int : 404 ms       P-R-T Axes :     21 180 degrees    QTcB Int : 523 ms    Atrial fibrillation with rapid ventricular response with premature  ventricular or aberrantly conducted complexes  Left bundle branch block  Abnormal ECG  When compared with ECG of 12-May-2025 11:24,  QT has lengthened    Referred By: AAAREFERRAL SELF           Confirmed By:     EXTRA TUBES [[id number]] Collected: 05/12/25 1424    Order Status: Sent Lab Status: In process Updated: 05/12/25 1437    Specimen: Blood, Venous     Narrative:      The following orders were created for panel order EXTRA TUBES.  Procedure                               Abnormality         Status                     ---------                               -----------         ------                     Light Blue Top Hold[[id number]]                             In process                 Gold Top Hold[[id number]]                                   In process                   Please view results for these tests on the individual orders.           Medications:  Reconciled Home Medications:      Medication List        START taking these medications      cephALEXin 500 MG capsule  Commonly known as: KEFLEX  Take 1 capsule (500 mg total) by mouth once daily. for 5 days     metoprolol succinate 25 MG 24 hr tablet  Commonly known as: TOPROL-XL  Take 0.5  tablets (12.5 mg total) by mouth once daily.            CHANGE how you take these medications      allopurinoL 300 MG tablet  Commonly known as: ZYLOPRIM  Take 1 tablet (300 mg total) by mouth once daily.  What changed: when to take this     cholestyramine (with sugar) 4 gram Powd  Take 1 Scoop by mouth once daily.  What changed: when to take this     furosemide 80 MG tablet  Commonly known as: LASIX  Take 1 tablet (80 mg total) by mouth once daily.  What changed:   medication strength  how much to take  Another medication with the same name was removed. Continue taking this medication, and follow the directions you see here.     polyethylene glycol 17 gram Pwpk  Commonly known as: GLYCOLAX  Take 17 g by mouth once daily.  What changed:   when to take this  reasons to take this     tamsulosin 0.4 mg Cap  Commonly known as: FLOMAX  TAKE 1 CAPSULE BY MOUTH EVERY DAY  What changed: when to take this            CONTINUE taking these medications      apixaban 2.5 mg Tab  Commonly known as: ELIQUIS  Take 1 tablet (2.5 mg total) by mouth 2 (two) times daily.     docusate sodium 100 MG capsule  Commonly known as: COLACE  Take 100 mg by mouth every morning.     I-CAPS ORAL  Take 1 tablet by mouth every evening.     latanoprost 0.005 % ophthalmic solution  Place 1 drop into both eyes every evening.     pantoprazole 40 MG tablet  Commonly known as: PROTONIX  Take 1 tablet (40 mg total) by mouth once daily.              X-Ray Chest AP Portable  Result Date: 5/12/2025  EXAMINATION: XR CHEST AP PORTABLE CLINICAL HISTORY: CHF; FINDINGS: Portable chest at 14:26 hours is compared to 04/21/2025 shows cardiomegaly.  There is hypoinflation. There are small bilateral pleural effusions with bibasilar airspace disease.  There is prominence of the central pulmonary vasculature suggestive of edema.  No acute osseous abnormality.     Hypoinflation with cardiomegaly and small pleural effusions Prominence of the pulmonary vasculature  suggestive of edema. Electronically signed by: Marlen Cunningham Date:    05/12/2025 Time:    14:41    US Lower Extremity Veins Bilateral  Result Date: 5/12/2025  EXAMINATION: US LOWER EXTREMITY VEINS BILATERAL CLINICAL HISTORY: Other specified soft tissue disorders COMPARISON: None FINDINGS: Grayscale, color Doppler, and spectral Doppler analysis was performed. Bilateral common femoral, femoral, popliteal, and proximal great saphenous veins show normal compressibility, augmentation, and color Doppler flow. Bilateral posterior tibial, anterior tibial, and peroneal veins are unremarkable.     No evidence of bilateral lower extremity DVT Electronically signed by: Marlen Cunningham Date:    05/12/2025 Time:    13:59    X-Ray Chest PA And Lateral  Result Date: 4/22/2025  EXAMINATION: XR CHEST PA AND LATERAL CLINICAL HISTORY: Shortness of breath TECHNIQUE: PA and lateral views of the chest were performed. COMPARISON: 03/28/2025. FINDINGS: Pulmonary hypoinflation crowding of the bronchopulmonary vessels.  Mild diffuse prominence of the pulmonary interstitium consistent with interstitial edema.  There are small bilateral pleural effusions with dependent atelectasis at the lung bases.  Heart size is enlarged.  Bony thorax intact.  Patient rotated to the LPO position.     1. Limited evaluation secondary to technique. 2. Findings consistent with congestive heart failure with small bilateral pleural effusions and bibasilar dependent atelectasis. Close interval follow-up is recommended.  This report was flagged in Epic as abnormal. Electronically signed by: Darren Vu Date:    04/22/2025 Time:    08:29  - pulls last radiology orders    Indwelling Lines/Drains at time of discharge:   Lines/Drains/Airways       None                   Time spent on the discharge of patient: 35 minutes         Jos Gomez MD  Department of Hospital Medicine  [facility]

## 2025-05-15 NOTE — ASSESSMENT & PLAN NOTE
Patient has persistent (7 days or more) atrial fibrillation. Patient is currently in atrial fibrillation. [id number] Score: 2. The patients heart rate in the last 24 hours is as follows:  Pulse  Min: 103  Max: 103     Antiarrhythmics  metoprolol succinate (TOPROL-XL) 24 hr tablet, Daily, Oral    Anticoagulants       Plan  - Replete lytes with a goal of K>4, Mg >2  - Patient is anticoagulated, see medications listed above.  - Patient's afib is currently controlled  - Monitor coags

## 2025-05-15 NOTE — ASSESSMENT & PLAN NOTE
Anemia is likely due to Iron deficiency and chronic disease due to Chronic Kidney Disease. Most recent hemoglobin and hematocrit are listed below.  Recent Labs     05/12/25  1424 05/13/25  0719 05/14/25  0708   HGB 8.9* 8.0* 8.1*   HCT 32.2* 27.8* 28.3*     Plan  - Monitor serial CBC: Daily  - Transfuse PRBC if patient becomes hemodynamically unstable, symptomatic or H/H drops below 7/21.  - Patient has received  PRBCs on in the past  - Patient's anemia is currently stable

## 2025-05-22 ENCOUNTER — EXTERNAL HOME HEALTH (OUTPATIENT)
Dept: HOME HEALTH SERVICES | Facility: HOSPITAL | Age: OVER 89
End: 2025-05-22
Payer: MEDICARE